# Patient Record
Sex: FEMALE | Race: WHITE | NOT HISPANIC OR LATINO | URBAN - METROPOLITAN AREA
[De-identification: names, ages, dates, MRNs, and addresses within clinical notes are randomized per-mention and may not be internally consistent; named-entity substitution may affect disease eponyms.]

---

## 2017-02-22 ENCOUNTER — OUTPATIENT (OUTPATIENT)
Dept: OUTPATIENT SERVICES | Facility: HOSPITAL | Age: 67
LOS: 1 days | End: 2017-02-22
Payer: MEDICARE

## 2017-02-22 PROCEDURE — 74230 X-RAY XM SWLNG FUNCJ C+: CPT | Mod: 26

## 2017-02-22 PROCEDURE — G8997: CPT | Mod: KX,CI

## 2017-02-22 PROCEDURE — G8998: CPT | Mod: KX,CI

## 2017-02-22 PROCEDURE — G8996: CPT | Mod: KX,CI

## 2017-02-22 PROCEDURE — 74230 X-RAY XM SWLNG FUNCJ C+: CPT

## 2017-02-22 PROCEDURE — 92611 MOTION FLUOROSCOPY/SWALLOW: CPT | Mod: KX

## 2017-09-29 PROBLEM — Z00.00 ENCOUNTER FOR PREVENTIVE HEALTH EXAMINATION: Status: ACTIVE | Noted: 2017-09-29

## 2017-10-05 ENCOUNTER — APPOINTMENT (OUTPATIENT)
Dept: NEUROLOGY | Facility: CLINIC | Age: 67
End: 2017-10-05
Payer: MEDICARE

## 2017-10-05 VITALS
WEIGHT: 192 LBS | HEART RATE: 73 BPM | HEIGHT: 62 IN | SYSTOLIC BLOOD PRESSURE: 162 MMHG | BODY MASS INDEX: 35.33 KG/M2 | DIASTOLIC BLOOD PRESSURE: 91 MMHG

## 2017-10-05 DIAGNOSIS — Z78.9 OTHER SPECIFIED HEALTH STATUS: ICD-10-CM

## 2017-10-05 PROCEDURE — 99205 OFFICE O/P NEW HI 60 MIN: CPT

## 2017-10-05 RX ORDER — ESCITALOPRAM OXALATE 20 MG/1
20 TABLET, FILM COATED ORAL
Refills: 0 | Status: ACTIVE | COMMUNITY

## 2017-10-12 ENCOUNTER — APPOINTMENT (OUTPATIENT)
Dept: NEUROLOGY | Facility: CLINIC | Age: 67
End: 2017-10-12
Payer: MEDICARE

## 2017-10-12 PROCEDURE — 95910 NRV CNDJ TEST 7-8 STUDIES: CPT

## 2017-10-12 PROCEDURE — 95885 MUSC TST DONE W/NERV TST LIM: CPT

## 2017-10-12 PROCEDURE — 99215 OFFICE O/P EST HI 40 MIN: CPT | Mod: 25

## 2017-11-20 ENCOUNTER — APPOINTMENT (OUTPATIENT)
Dept: NEUROLOGY | Facility: CLINIC | Age: 67
End: 2017-11-20
Payer: MEDICARE

## 2017-11-20 VITALS
SYSTOLIC BLOOD PRESSURE: 165 MMHG | HEART RATE: 64 BPM | BODY MASS INDEX: 35.33 KG/M2 | DIASTOLIC BLOOD PRESSURE: 102 MMHG | WEIGHT: 192 LBS | HEIGHT: 62 IN

## 2017-11-20 PROCEDURE — 99215 OFFICE O/P EST HI 40 MIN: CPT | Mod: 25

## 2017-11-20 PROCEDURE — 94010 BREATHING CAPACITY TEST: CPT

## 2017-12-14 ENCOUNTER — APPOINTMENT (OUTPATIENT)
Dept: PULMONOLOGY | Facility: CLINIC | Age: 67
End: 2017-12-14

## 2018-01-08 ENCOUNTER — APPOINTMENT (OUTPATIENT)
Dept: NEUROLOGY | Facility: CLINIC | Age: 68
End: 2018-01-08
Payer: MEDICARE

## 2018-01-08 VITALS
WEIGHT: 181 LBS | SYSTOLIC BLOOD PRESSURE: 169 MMHG | BODY MASS INDEX: 33.31 KG/M2 | HEART RATE: 87 BPM | HEIGHT: 62 IN | DIASTOLIC BLOOD PRESSURE: 80 MMHG

## 2018-01-08 VITALS — SYSTOLIC BLOOD PRESSURE: 134 MMHG | DIASTOLIC BLOOD PRESSURE: 90 MMHG | HEART RATE: 87 BPM

## 2018-01-08 DIAGNOSIS — D33.3 BENIGN NEOPLASM OF CRANIAL NERVES: ICD-10-CM

## 2018-01-08 DIAGNOSIS — K85.90 ACUTE PANCREATITIS WITHOUT NECROSIS OR INFECTION, UNSPECIFIED: ICD-10-CM

## 2018-01-08 DIAGNOSIS — K21.9 GASTRO-ESOPHAGEAL REFLUX DISEASE W/OUT ESOPHAGITIS: ICD-10-CM

## 2018-01-08 DIAGNOSIS — I10 ESSENTIAL (PRIMARY) HYPERTENSION: ICD-10-CM

## 2018-01-08 PROCEDURE — 99215 OFFICE O/P EST HI 40 MIN: CPT

## 2018-04-09 ENCOUNTER — APPOINTMENT (OUTPATIENT)
Dept: NEUROLOGY | Facility: CLINIC | Age: 68
End: 2018-04-09
Payer: MEDICARE

## 2018-04-09 VITALS
WEIGHT: 174 LBS | SYSTOLIC BLOOD PRESSURE: 160 MMHG | DIASTOLIC BLOOD PRESSURE: 92 MMHG | BODY MASS INDEX: 32.02 KG/M2 | HEART RATE: 74 BPM | HEIGHT: 62 IN

## 2018-04-09 PROCEDURE — 99215 OFFICE O/P EST HI 40 MIN: CPT

## 2018-07-16 ENCOUNTER — APPOINTMENT (OUTPATIENT)
Dept: NEUROLOGY | Facility: CLINIC | Age: 68
End: 2018-07-16
Payer: MEDICARE

## 2018-07-16 VITALS
BODY MASS INDEX: 29.63 KG/M2 | DIASTOLIC BLOOD PRESSURE: 84 MMHG | HEART RATE: 73 BPM | SYSTOLIC BLOOD PRESSURE: 132 MMHG | WEIGHT: 161 LBS | HEIGHT: 62 IN

## 2018-07-16 PROCEDURE — 99215 OFFICE O/P EST HI 40 MIN: CPT

## 2018-10-15 ENCOUNTER — APPOINTMENT (OUTPATIENT)
Dept: NEUROLOGY | Facility: CLINIC | Age: 68
End: 2018-10-15
Payer: MEDICARE

## 2018-10-15 VITALS
HEART RATE: 107 BPM | WEIGHT: 146 LBS | SYSTOLIC BLOOD PRESSURE: 154 MMHG | BODY MASS INDEX: 26.87 KG/M2 | DIASTOLIC BLOOD PRESSURE: 90 MMHG | HEIGHT: 62 IN

## 2018-10-15 PROCEDURE — 99215 OFFICE O/P EST HI 40 MIN: CPT

## 2018-12-04 ENCOUNTER — TRANSCRIPTION ENCOUNTER (OUTPATIENT)
Age: 68
End: 2018-12-04

## 2018-12-05 ENCOUNTER — OUTPATIENT (OUTPATIENT)
Dept: OUTPATIENT SERVICES | Facility: HOSPITAL | Age: 68
LOS: 1 days | End: 2018-12-05
Payer: MEDICARE

## 2018-12-05 DIAGNOSIS — K80.00 CALCULUS OF GALLBLADDER WITH ACUTE CHOLECYSTITIS WITHOUT OBSTRUCTION: ICD-10-CM

## 2018-12-05 PROCEDURE — L8699: CPT

## 2018-12-05 PROCEDURE — 43246 EGD PLACE GASTROSTOMY TUBE: CPT

## 2018-12-07 ENCOUNTER — RX RENEWAL (OUTPATIENT)
Age: 68
End: 2018-12-07

## 2018-12-07 RX ORDER — ONDANSETRON 4 MG/5ML
4 SOLUTION ORAL
Qty: 600 | Refills: 2 | Status: ACTIVE | COMMUNITY
Start: 2018-12-07 | End: 1900-01-01

## 2019-01-07 ENCOUNTER — APPOINTMENT (OUTPATIENT)
Dept: NEUROLOGY | Facility: CLINIC | Age: 69
End: 2019-01-07
Payer: MEDICARE

## 2019-01-07 VITALS
HEIGHT: 62 IN | SYSTOLIC BLOOD PRESSURE: 149 MMHG | WEIGHT: 136 LBS | HEART RATE: 112 BPM | DIASTOLIC BLOOD PRESSURE: 94 MMHG | BODY MASS INDEX: 25.03 KG/M2

## 2019-01-07 LAB
BASOPHILS # BLD AUTO: 0.01 K/UL
BASOPHILS NFR BLD AUTO: 0.1 %
EOSINOPHIL # BLD AUTO: 0.01 K/UL
EOSINOPHIL NFR BLD AUTO: 0.1 %
HCT VFR BLD CALC: 33.5 %
HGB BLD-MCNC: 11.9 G/DL
IMM GRANULOCYTES NFR BLD AUTO: 0.2 %
LYMPHOCYTES # BLD AUTO: 0.7 K/UL
LYMPHOCYTES NFR BLD AUTO: 7.4 %
MAN DIFF?: NORMAL
MCHC RBC-ENTMCNC: 35.1 PG
MCHC RBC-ENTMCNC: 35.5 GM/DL
MCV RBC AUTO: 98.8 FL
MONOCYTES # BLD AUTO: 0.65 K/UL
MONOCYTES NFR BLD AUTO: 6.9 %
NEUTROPHILS # BLD AUTO: 8.01 K/UL
NEUTROPHILS NFR BLD AUTO: 85.3 %
PLATELET # BLD AUTO: 223 K/UL
RBC # BLD: 3.39 M/UL
RBC # FLD: 13.5 %
WBC # FLD AUTO: 9.4 K/UL

## 2019-01-07 PROCEDURE — 99215 OFFICE O/P EST HI 40 MIN: CPT

## 2019-01-07 NOTE — PHYSICAL EXAM
[Person] : oriented to person [Place] : oriented to place [Time] : oriented to time [Short Term Intact] : short term memory intact [Remote Intact] : remote memory intact [Registration Intact] : recent registration memory intact [Concentration Intact] : normal concentrating ability [Visual Intact] : visual attention was ~T not ~L decreased [Naming Objects] : no difficulty naming common objects [Repeating Phrases] : no difficulty repeating a phrase [Writing A Sentence] : no difficulty writing a sentence [Fluency] : fluency intact [Comprehension] : comprehension intact [Reading] : reading intact [Past History] : adequate knowledge of personal past history [Cranial Nerves Optic (II)] : visual acuity intact bilaterally,  visual fields full to confrontation, pupils equal round and reactive to light [Cranial Nerves Oculomotor (III)] : extraocular motion intact [Cranial Nerves Trigeminal (V)] : facial sensation intact symmetrically [Cranial Nerves Facial (VII)] : face symmetrical [Cranial Nerves Vestibulocochlear (VIII)] : hearing was intact bilaterally [Cranial Nerves Glossopharyngeal (IX)] : tongue and palate midline [Cranial Nerves Accessory (XI - Cranial And Spinal)] : head turning and shoulder shrug symmetric [Motor Tone] : muscle tone was normal in all four extremities [Motor Handedness Right-Handed] : the patient is right hand dominant [Hand Weakness Right] : the hand  was weak [-4] : arm extension  -4/5 [Hand Weakness Left] : the hand  was weak [4] : fingers flexion 4/5 [+4] : knee extension +4/5 [5] : ankle plantar flexion 5/5 [Sensation Tactile Decrease] : light touch was intact [Sensation Vibration Decrease] : vibration was intact [Proprioception] : proprioception was intact [Balance] : balance was intact [Past-pointing] : there was no past-pointing [Tremor] : no tremor present [3+] : Ankle jerk left 3+ [Plantar Reflex Right Only] : abnormal on the right [Plantar Reflex Left Only] : abnormal on the left [___] : absent on the right [FreeTextEntry5] : tongue atrophy and fascics, not moving, loss of speech [FreeTextEntry6] : ALS-FRS 24/48, bilateral intrinsic hand atrophy [FreeTextEntry8] : slower gait

## 2019-01-07 NOTE — ASSESSMENT
[FreeTextEntry1] : Patient slowly progressing, now with more UE and LE weakness.  Loss of speech but still able to swallow solids, getting fluids in PEG. \par \par Will cont riluzole and glycopyrrolate, check LFT's\par \par Will discuss NIV at next visit

## 2019-01-07 NOTE — HISTORY OF PRESENT ILLNESS
[FreeTextEntry1] : Patient now seeing Dr. Perlman for GI issues.  Working up to 5 cans daily of formula.  Patient reports right side getting weaker in the arm and hand, walks ok but starting to notice minor changes.  Fine movements of hands becoming difficult.  She is still eating solid food ok, putting free water in PEG.  Lives with son. Now getting ALMEIDA.

## 2019-01-08 LAB
ALBUMIN SERPL ELPH-MCNC: 3.6 G/DL
ALP BLD-CCNC: 87 U/L
ALT SERPL-CCNC: 13 U/L
AST SERPL-CCNC: 29 U/L
BILIRUB DIRECT SERPL-MCNC: 0.3 MG/DL
BILIRUB INDIRECT SERPL-MCNC: 0.8 MG/DL
BILIRUB SERPL-MCNC: 1.2 MG/DL
PROT SERPL-MCNC: 9.8 G/DL

## 2019-02-12 ENCOUNTER — APPOINTMENT (OUTPATIENT)
Dept: PULMONOLOGY | Facility: CLINIC | Age: 69
End: 2019-02-12
Payer: MEDICARE

## 2019-02-12 VITALS
HEIGHT: 62 IN | HEART RATE: 85 BPM | DIASTOLIC BLOOD PRESSURE: 85 MMHG | SYSTOLIC BLOOD PRESSURE: 145 MMHG | OXYGEN SATURATION: 97 % | TEMPERATURE: 97 F | RESPIRATION RATE: 16 BRPM | WEIGHT: 136 LBS | BODY MASS INDEX: 25.03 KG/M2

## 2019-02-12 DIAGNOSIS — R06.00 DYSPNEA, UNSPECIFIED: ICD-10-CM

## 2019-02-12 DIAGNOSIS — R06.89 DYSPNEA, UNSPECIFIED: ICD-10-CM

## 2019-02-12 DIAGNOSIS — G12.20 MOTOR NEURON DISEASE, UNSPECIFIED: ICD-10-CM

## 2019-02-12 DIAGNOSIS — J30.9 ALLERGIC RHINITIS, UNSPECIFIED: ICD-10-CM

## 2019-02-12 PROCEDURE — 99204 OFFICE O/P NEW MOD 45 MIN: CPT

## 2019-02-12 RX ORDER — ONDANSETRON 8 MG/1
8 TABLET, ORALLY DISINTEGRATING ORAL
Qty: 30 | Refills: 5 | Status: DISCONTINUED | COMMUNITY
Start: 2018-12-07 | End: 2019-02-12

## 2019-02-12 RX ORDER — NEBIVOLOL HYDROCHLORIDE 10 MG/1
10 TABLET ORAL
Refills: 0 | Status: DISCONTINUED | COMMUNITY
End: 2019-02-12

## 2019-02-12 RX ORDER — FLUTICASONE PROPIONATE 50 UG/1
50 SPRAY, METERED NASAL
Qty: 1 | Refills: 5 | Status: ACTIVE | COMMUNITY
Start: 2019-02-12 | End: 1900-01-01

## 2019-02-12 RX ORDER — PANTOPRAZOLE SODIUM 40 MG/1
40 TABLET, DELAYED RELEASE ORAL
Refills: 0 | Status: DISCONTINUED | COMMUNITY
End: 2019-02-12

## 2019-02-12 RX ORDER — FEXOFENADINE HYDROCHLORIDE 30 MG/5ML
30 SUSPENSION ORAL DAILY
Qty: 150 | Refills: 5 | Status: DISCONTINUED | COMMUNITY
Start: 2018-01-08 | End: 2019-02-12

## 2019-02-12 NOTE — ASSESSMENT
[FreeTextEntry1] : 68F hx ALS, acoustic neuroma now w/ Bell's Palsy, who comes today for initial evaluation. Referred by neurology for NIV therapy. Pt demonstrates bulbar weakness with dysphagia. Unable to sleep flat and requires chair/upright position at night. At this time, she will require some type of NIV for her neuromuscular disease. Unable to complete PFTs because of poor seal from Bell's Palsy. \par - will order bilevel acclimatizing test - IBW 50 kg, Vt should be around 300\par \par Case d/w Dr. Genao

## 2019-02-12 NOTE — PHYSICAL EXAM
[General Appearance - Well Developed] : well developed [General Appearance - In No Acute Distress] : no acute distress [Normal Conjunctiva] : the conjunctiva exhibited no abnormalities [Neck Appearance] : the appearance of the neck was normal [Heart Sounds] : normal S1 and S2 [Murmurs] : no murmurs present [] : no respiratory distress [Exaggerated Use Of Accessory Muscles For Inspiration] : no accessory muscle use [Auscultation Breath Sounds / Voice Sounds] : lungs were clear to auscultation bilaterally [Bowel Sounds] : normal bowel sounds [Abdomen Soft] : soft [Abnormal Walk] : normal gait [Nail Clubbing] : no clubbing of the fingernails [Cyanosis, Localized] : no localized cyanosis [Skin Color & Pigmentation] : normal skin color and pigmentation [Skin Turgor] : normal skin turgor [Oriented To Time, Place, And Person] : oriented to person, place, and time [Affect] : the affect was normal [Mood] : the mood was normal [FreeTextEntry1] : Bell's palsy [FreeTextEntry2] : no b/l LE edema

## 2019-02-12 NOTE — HISTORY OF PRESENT ILLNESS
[FreeTextEntry1] : 68F hx ALS, acoustic neuroma now w/ Bell's Palsy, who comes today for initial evaluation. Referred by neurology for NIV therapy. Pt has ALS, which was first diagnosed around 10/2017 when she had changes to her voice, followed by progressive weakness. Sees neurology and in the past year has had difficulty with balancing, dysphagia, progressive weakness, inability to speak (communicates through writing). No difficulties breathing at night, though prefers to sit up when she sleeps. +weight loss, now has PEG for nutrition. Otherwise denies fevers, chills, chest pain, SOB, abdominal pain, nausea/vomiting, diarrhea, b/l LE edema. + Weak cough. Unable to complete PFTs today because of poor seal. \par  \par PFTs 1/8/2018: FEV1 58%, FEV1/FVC 60%

## 2019-02-12 NOTE — REVIEW OF SYSTEMS
[Recent Wt Loss (___ Lbs)] : recent [unfilled] ~Ulb weight loss [Dysphagia] : dysphagia [Involuntary Movements] : ~T involuntary movements [Negative] : Sleep Disorder [Fever] : no fever [Fatigue] : no fatigue [Heartburn] : no heartburn [Reflux] : no reflux [Diarrhea] : no diarrhea [Abdominal Pain] : no abdominal pain [Memory Loss] : no ~T memory lapses or loss [de-identified] : weakness, dysequilibrium

## 2019-02-12 NOTE — END OF VISIT
[] : Fellow [FreeTextEntry3] : Mrs. Dale has progressive ALS and would benefit from Bipap nocturnally.  She has progressive dyspnea, orthopnea, and bulbar symptoms.  Agree with recommendations as outliend above.   [Time Spent: ___ minutes] : I have spent [unfilled] minutes of face to face time with the patient

## 2019-03-20 ENCOUNTER — OUTPATIENT (OUTPATIENT)
Dept: OUTPATIENT SERVICES | Facility: HOSPITAL | Age: 69
LOS: 1 days | End: 2019-03-20
Payer: MEDICARE

## 2019-03-20 ENCOUNTER — APPOINTMENT (OUTPATIENT)
Dept: SLEEP CENTER | Facility: CLINIC | Age: 69
End: 2019-03-20
Payer: MEDICARE

## 2019-03-20 PROCEDURE — 95807 SLEEP STUDY ATTENDED: CPT

## 2019-03-20 PROCEDURE — 95807 SLEEP STUDY ATTENDED: CPT | Mod: 26

## 2019-03-21 DIAGNOSIS — G47.33 OBSTRUCTIVE SLEEP APNEA (ADULT) (PEDIATRIC): ICD-10-CM

## 2019-03-25 ENCOUNTER — APPOINTMENT (OUTPATIENT)
Dept: NEUROLOGY | Facility: CLINIC | Age: 69
End: 2019-03-25
Payer: MEDICARE

## 2019-03-25 VITALS
DIASTOLIC BLOOD PRESSURE: 79 MMHG | BODY MASS INDEX: 24.29 KG/M2 | SYSTOLIC BLOOD PRESSURE: 121 MMHG | HEIGHT: 62 IN | HEART RATE: 89 BPM | WEIGHT: 132 LBS

## 2019-03-25 PROCEDURE — 99215 OFFICE O/P EST HI 40 MIN: CPT

## 2019-03-25 NOTE — PHYSICAL EXAM
[Person] : oriented to person [Place] : oriented to place [Time] : oriented to time [Short Term Intact] : short term memory intact [Remote Intact] : remote memory intact [Registration Intact] : recent registration memory intact [Concentration Intact] : normal concentrating ability [Visual Intact] : visual attention was ~T not ~L decreased [Naming Objects] : no difficulty naming common objects [Repeating Phrases] : no difficulty repeating a phrase [Writing A Sentence] : no difficulty writing a sentence [Fluency] : fluency intact [Comprehension] : comprehension intact [Reading] : reading intact [Past History] : adequate knowledge of personal past history [Cranial Nerves Optic (II)] : visual acuity intact bilaterally,  visual fields full to confrontation, pupils equal round and reactive to light [Cranial Nerves Oculomotor (III)] : extraocular motion intact [Cranial Nerves Trigeminal (V)] : facial sensation intact symmetrically [Cranial Nerves Facial (VII)] : face symmetrical [Cranial Nerves Vestibulocochlear (VIII)] : hearing was intact bilaterally [Cranial Nerves Glossopharyngeal (IX)] : tongue and palate midline [Cranial Nerves Accessory (XI - Cranial And Spinal)] : head turning and shoulder shrug symmetric [Motor Handedness Right-Handed] : the patient is right hand dominant [Hand Weakness Right] : the hand  was weak [3] : shoulder abduction 3/5 [Hand Weakness Left] : the hand  was weak [-4] : ankle inversion -4/5 [4] : knee flexion 4/5 [+4] : ankle inversion +4/5 [Sensation Tactile Decrease] : light touch was intact [Sensation Vibration Decrease] : vibration was intact [Proprioception] : proprioception was intact [Balance] : balance was intact [3+] : Brachioradialis left 3+ [4+] : Ankle jerk left 4+ [Plantar Reflex Right Only] : abnormal on the right [Plantar Reflex Left Only] : abnormal on the left [___] : [unfilled] ~Ubeats present on the right [___] : [unfilled] ~Ubeats present on the left [Past-pointing] : there was no past-pointing [Tremor] : no tremor present [FreeTextEntry5] : tongue atrophy and fascics, not moving, loss of speech [FreeTextEntry6] : ALS-FRS 20/48, bilateral intrinsic hand atrophy, increased tone LLE [FreeTextEntry8] : walks with cane, unsteady and wide based

## 2019-03-25 NOTE — ASSESSMENT
[FreeTextEntry1] : Patient using writing board to communicate.  She is taking some small soft food for pleasure, will be getting BiPAP soon, is ok with NIV.  \par \par Would cont riluzole (switch to tiglutek liquid) and glycopyrrolate.\par \par Would now get her a WC.  \par \par We had a long discussion about her end of life wishes, she does not want invasive ventilation at this point and is going to continue to think about it.  She prefers hospice at home.  \par \par Her son is HC proxy and sister is alternate.  one hour spend on counseling and care

## 2019-03-25 NOTE — HISTORY OF PRESENT ILLNESS
[FreeTextEntry1] : Patient presents with sisters, she has been falling lately and she feels her legs are weaker, using cane.  Has a sprained right foot.  She is using Linzess for constipation.  She is swallowing soft foods, small portions, without coughing, no liquids - that goes into PEG.  Using 2-3 cans of formula daily.  Had bladder infection and had cipro, urinary sx's subsided.  Glycopyrrolate helping with the saliva and drooling.\par \par She had sleep study and is getting BiPAP soon.  She states she has been constipated for two years.  Has 2-3 BM's per week.

## 2019-03-26 ENCOUNTER — OTHER (OUTPATIENT)
Age: 69
End: 2019-03-26

## 2019-03-28 RX ORDER — LACTULOSE 10 G/15ML
10 SOLUTION ORAL
Qty: 1 | Refills: 5 | Status: ACTIVE | COMMUNITY
Start: 2019-03-28 | End: 1900-01-01

## 2019-04-08 ENCOUNTER — APPOINTMENT (OUTPATIENT)
Dept: NEUROLOGY | Facility: CLINIC | Age: 69
End: 2019-04-08

## 2019-04-15 ENCOUNTER — OUTPATIENT (OUTPATIENT)
Dept: OUTPATIENT SERVICES | Facility: HOSPITAL | Age: 69
LOS: 1 days | End: 2019-04-15
Payer: MEDICARE

## 2019-04-15 ENCOUNTER — APPOINTMENT (OUTPATIENT)
Dept: SLEEP CENTER | Facility: CLINIC | Age: 69
End: 2019-04-15
Payer: MEDICARE

## 2019-04-15 PROCEDURE — 95810 POLYSOM 6/> YRS 4/> PARAM: CPT | Mod: 26

## 2019-04-15 PROCEDURE — 95810 POLYSOM 6/> YRS 4/> PARAM: CPT

## 2019-04-16 DIAGNOSIS — G47.33 OBSTRUCTIVE SLEEP APNEA (ADULT) (PEDIATRIC): ICD-10-CM

## 2019-04-23 ENCOUNTER — APPOINTMENT (OUTPATIENT)
Dept: PULMONOLOGY | Facility: CLINIC | Age: 69
End: 2019-04-23
Payer: MEDICARE

## 2019-04-23 DIAGNOSIS — G70.9 MYONEURAL DISORDER, UNSPECIFIED: ICD-10-CM

## 2019-04-23 DIAGNOSIS — G47.36 MYONEURAL DISORDER, UNSPECIFIED: ICD-10-CM

## 2019-04-23 PROCEDURE — 36600 WITHDRAWAL OF ARTERIAL BLOOD: CPT | Mod: 59

## 2019-04-23 PROCEDURE — 82803 BLOOD GASES ANY COMBINATION: CPT

## 2019-04-29 ENCOUNTER — MOBILE ON CALL (OUTPATIENT)
Age: 69
End: 2019-04-29

## 2019-05-30 ENCOUNTER — APPOINTMENT (OUTPATIENT)
Dept: NEUROLOGY | Facility: CLINIC | Age: 69
End: 2019-05-30
Payer: MEDICARE

## 2019-05-30 VITALS
SYSTOLIC BLOOD PRESSURE: 124 MMHG | HEART RATE: 97 BPM | WEIGHT: 127 LBS | DIASTOLIC BLOOD PRESSURE: 83 MMHG | BODY MASS INDEX: 23.37 KG/M2 | HEIGHT: 62 IN

## 2019-05-30 PROCEDURE — 99214 OFFICE O/P EST MOD 30 MIN: CPT

## 2019-05-30 RX ORDER — LINACLOTIDE 72 UG/1
72 CAPSULE, GELATIN COATED ORAL DAILY
Refills: 0 | Status: ACTIVE | COMMUNITY

## 2019-05-30 RX ORDER — RILUZOLE 50 MG/1
50 TABLET, FILM COATED ORAL
Qty: 60 | Refills: 5 | Status: DISCONTINUED | COMMUNITY
Start: 2017-10-12 | End: 2019-05-30

## 2019-05-30 RX ORDER — RILUZOLE 50 MG/1
50 TABLET, FILM COATED ORAL
Qty: 60 | Refills: 5 | Status: DISCONTINUED | COMMUNITY
Start: 2018-10-15 | End: 2019-05-30

## 2019-05-30 NOTE — ASSESSMENT
[FreeTextEntry1] : Patient is stable with ALS-FRS, will f/u with pulmonary.  I will send to  clinic at [facility].\par \par cont with tiglutek, increase glycopyrrolate to 2mg TID\par \par f/u 4 month

## 2019-05-30 NOTE — HISTORY OF PRESENT ILLNESS
[FreeTextEntry1] : Patient here for f/u, states she can still swallow soft foods.  Has lost weight.  She bought an electric WC on [employer]'s list but needs a lighter one.  Still drooling, medication helps.  She denies SOB.  She is followed by pulmonary, can't do PFT's because can't make seal around mouthpiece.  Hands are getting weaker, but she can still write.  No falls.  No muscle cramps.  Using AAC

## 2019-05-30 NOTE — PHYSICAL EXAM
[Person] : oriented to person [Place] : oriented to place [Time] : oriented to time [Short Term Intact] : short term memory intact [Remote Intact] : remote memory intact [Registration Intact] : recent registration memory intact [Concentration Intact] : normal concentrating ability [Visual Intact] : visual attention was ~T not ~L decreased [Naming Objects] : no difficulty naming common objects [Repeating Phrases] : no difficulty repeating a phrase [Writing A Sentence] : no difficulty writing a sentence [Fluency] : fluency intact [Comprehension] : comprehension intact [Reading] : reading intact [Past History] : adequate knowledge of personal past history [Cranial Nerves Optic (II)] : visual acuity intact bilaterally,  visual fields full to confrontation, pupils equal round and reactive to light [Cranial Nerves Oculomotor (III)] : extraocular motion intact [Cranial Nerves Trigeminal (V)] : facial sensation intact symmetrically [Cranial Nerves Facial (VII)] : face symmetrical [Cranial Nerves Vestibulocochlear (VIII)] : hearing was intact bilaterally [Cranial Nerves Glossopharyngeal (IX)] : tongue and palate midline [Cranial Nerves Accessory (XI - Cranial And Spinal)] : head turning and shoulder shrug symmetric [Motor Handedness Right-Handed] : the patient is right hand dominant [Hand Weakness Right] : the hand  was weak [3] : shoulder abduction 3/5 [Hand Weakness Left] : the hand  was weak [-4] : ankle inversion -4/5 [4] : knee flexion 4/5 [+4] : ankle inversion +4/5 [Sensation Tactile Decrease] : light touch was intact [Sensation Vibration Decrease] : vibration was intact [Proprioception] : proprioception was intact [Balance] : balance was intact [Past-pointing] : there was no past-pointing [Tremor] : no tremor present [3+] : Brachioradialis left 3+ [4+] : Ankle jerk left 4+ [Plantar Reflex Right Only] : abnormal on the right [Plantar Reflex Left Only] : abnormal on the left [___] : [unfilled] ~Ubeats present on the right [___] : [unfilled] ~Ubeats present on the left [FreeTextEntry5] : tongue atrophy and fascics, not moving, loss of speech [FreeTextEntry6] : ALS-FRS 23/48, bilateral intrinsic hand atrophy, increased tone LLE [FreeTextEntry8] : walks with cane, unsteady and wide based

## 2019-06-14 ENCOUNTER — INPATIENT (INPATIENT)
Facility: HOSPITAL | Age: 69
LOS: 2 days | Discharge: HOME | DRG: 202 | End: 2019-06-17
Attending: INTERNAL MEDICINE | Admitting: HOSPITALIST
Payer: MEDICARE

## 2019-06-14 VITALS
OXYGEN SATURATION: 96 % | HEART RATE: 99 BPM | WEIGHT: 125 LBS | DIASTOLIC BLOOD PRESSURE: 100 MMHG | SYSTOLIC BLOOD PRESSURE: 164 MMHG | RESPIRATION RATE: 18 BRPM | TEMPERATURE: 99 F

## 2019-06-14 DIAGNOSIS — Z29.9 ENCOUNTER FOR PROPHYLACTIC MEASURES, UNSPECIFIED: ICD-10-CM

## 2019-06-14 DIAGNOSIS — I10 ESSENTIAL (PRIMARY) HYPERTENSION: ICD-10-CM

## 2019-06-14 DIAGNOSIS — J40 BRONCHITIS, NOT SPECIFIED AS ACUTE OR CHRONIC: ICD-10-CM

## 2019-06-14 DIAGNOSIS — G12.21 AMYOTROPHIC LATERAL SCLEROSIS: ICD-10-CM

## 2019-06-14 LAB
ALBUMIN SERPL ELPH-MCNC: 3.3 G/DL — SIGNIFICANT CHANGE UP (ref 3.3–5)
ALP SERPL-CCNC: 92 U/L — SIGNIFICANT CHANGE UP (ref 40–120)
ALT FLD-CCNC: 43 U/L — SIGNIFICANT CHANGE UP (ref 12–78)
ANION GAP SERPL CALC-SCNC: 8 MMOL/L — SIGNIFICANT CHANGE UP (ref 5–17)
APTT BLD: 38.1 SEC — HIGH (ref 28.5–37)
AST SERPL-CCNC: 22 U/L — SIGNIFICANT CHANGE UP (ref 15–37)
BASE EXCESS BLDA CALC-SCNC: 5.5 MMOL/L — HIGH (ref -2–2)
BASOPHILS # BLD AUTO: 0.02 K/UL — SIGNIFICANT CHANGE UP (ref 0–0.2)
BASOPHILS NFR BLD AUTO: 0.3 % — SIGNIFICANT CHANGE UP (ref 0–2)
BILIRUB SERPL-MCNC: 0.6 MG/DL — SIGNIFICANT CHANGE UP (ref 0.2–1.2)
BLOOD GAS COMMENTS ARTERIAL: SIGNIFICANT CHANGE UP
BLOOD GAS COMMENTS ARTERIAL: SIGNIFICANT CHANGE UP
BUN SERPL-MCNC: 32 MG/DL — HIGH (ref 7–23)
CALCIUM SERPL-MCNC: 9.5 MG/DL — SIGNIFICANT CHANGE UP (ref 8.5–10.1)
CHLORIDE SERPL-SCNC: 101 MMOL/L — SIGNIFICANT CHANGE UP (ref 96–108)
CO2 SERPL-SCNC: 29 MMOL/L — SIGNIFICANT CHANGE UP (ref 22–31)
CREAT SERPL-MCNC: 0.86 MG/DL — SIGNIFICANT CHANGE UP (ref 0.5–1.3)
EOSINOPHIL # BLD AUTO: 0 K/UL — SIGNIFICANT CHANGE UP (ref 0–0.5)
EOSINOPHIL NFR BLD AUTO: 0 % — SIGNIFICANT CHANGE UP (ref 0–6)
GLUCOSE SERPL-MCNC: 139 MG/DL — HIGH (ref 70–99)
HCO3 BLDA-SCNC: 29 MMOL/L — HIGH (ref 23–27)
HCT VFR BLD CALC: 31 % — LOW (ref 34.5–45)
HGB BLD-MCNC: 10.6 G/DL — LOW (ref 11.5–15.5)
HOROWITZ INDEX BLDA+IHG-RTO: 21 — SIGNIFICANT CHANGE UP
IMM GRANULOCYTES NFR BLD AUTO: 3.1 % — HIGH (ref 0–1.5)
INR BLD: 1.1 RATIO — SIGNIFICANT CHANGE UP (ref 0.88–1.16)
LACTATE SERPL-SCNC: 1.4 MMOL/L — SIGNIFICANT CHANGE UP (ref 0.7–2)
LYMPHOCYTES # BLD AUTO: 0.55 K/UL — LOW (ref 1–3.3)
LYMPHOCYTES # BLD AUTO: 9.5 % — LOW (ref 13–44)
MCHC RBC-ENTMCNC: 33.5 PG — SIGNIFICANT CHANGE UP (ref 27–34)
MCHC RBC-ENTMCNC: 34.2 GM/DL — SIGNIFICANT CHANGE UP (ref 32–36)
MCV RBC AUTO: 98.1 FL — SIGNIFICANT CHANGE UP (ref 80–100)
MONOCYTES # BLD AUTO: 0.19 K/UL — SIGNIFICANT CHANGE UP (ref 0–0.9)
MONOCYTES NFR BLD AUTO: 3.3 % — SIGNIFICANT CHANGE UP (ref 2–14)
NEUTROPHILS # BLD AUTO: 4.87 K/UL — SIGNIFICANT CHANGE UP (ref 1.8–7.4)
NEUTROPHILS NFR BLD AUTO: 83.8 % — HIGH (ref 43–77)
NRBC # BLD: 0 /100 WBCS — SIGNIFICANT CHANGE UP (ref 0–0)
PCO2 BLDA: 43 MMHG — SIGNIFICANT CHANGE UP (ref 32–46)
PH BLDA: 7.45 — SIGNIFICANT CHANGE UP (ref 7.35–7.45)
PLATELET # BLD AUTO: 259 K/UL — SIGNIFICANT CHANGE UP (ref 150–400)
PO2 BLDA: 76 MMHG — SIGNIFICANT CHANGE UP (ref 74–108)
POTASSIUM SERPL-MCNC: 3.9 MMOL/L — SIGNIFICANT CHANGE UP (ref 3.5–5.3)
POTASSIUM SERPL-SCNC: 3.9 MMOL/L — SIGNIFICANT CHANGE UP (ref 3.5–5.3)
PROT SERPL-MCNC: 9.6 G/DL — HIGH (ref 6–8.3)
PROTHROM AB SERPL-ACNC: 12.6 SEC — SIGNIFICANT CHANGE UP (ref 10–12.9)
RBC # BLD: 3.16 M/UL — LOW (ref 3.8–5.2)
RBC # FLD: 11.9 % — SIGNIFICANT CHANGE UP (ref 10.3–14.5)
SAO2 % BLDA: 94 % — SIGNIFICANT CHANGE UP (ref 92–96)
SODIUM SERPL-SCNC: 138 MMOL/L — SIGNIFICANT CHANGE UP (ref 135–145)
WBC # BLD: 5.81 K/UL — SIGNIFICANT CHANGE UP (ref 3.8–10.5)
WBC # FLD AUTO: 5.81 K/UL — SIGNIFICANT CHANGE UP (ref 3.8–10.5)

## 2019-06-14 PROCEDURE — 99222 1ST HOSP IP/OBS MODERATE 55: CPT | Mod: GC

## 2019-06-14 PROCEDURE — 71045 X-RAY EXAM CHEST 1 VIEW: CPT | Mod: 26

## 2019-06-14 PROCEDURE — 99285 EMERGENCY DEPT VISIT HI MDM: CPT

## 2019-06-14 RX ORDER — ENOXAPARIN SODIUM 100 MG/ML
40 INJECTION SUBCUTANEOUS EVERY 24 HOURS
Refills: 0 | Status: DISCONTINUED | OUTPATIENT
Start: 2019-06-14 | End: 2019-06-17

## 2019-06-14 RX ORDER — IPRATROPIUM/ALBUTEROL SULFATE 18-103MCG
3 AEROSOL WITH ADAPTER (GRAM) INHALATION ONCE
Refills: 0 | Status: COMPLETED | OUTPATIENT
Start: 2019-06-14 | End: 2019-06-14

## 2019-06-14 RX ORDER — GUAIFENESIN/DEXTROMETHORPHAN 600MG-30MG
10 TABLET, EXTENDED RELEASE 12 HR ORAL EVERY 4 HOURS
Refills: 0 | Status: DISCONTINUED | OUTPATIENT
Start: 2019-06-14 | End: 2019-06-15

## 2019-06-14 RX ORDER — GUAIFENESIN/DEXTROMETHORPHAN 600MG-30MG
20 TABLET, EXTENDED RELEASE 12 HR ORAL EVERY 4 HOURS
Refills: 0 | Status: DISCONTINUED | OUTPATIENT
Start: 2019-06-14 | End: 2019-06-14

## 2019-06-14 RX ADMIN — Medication 3 MILLILITER(S): at 20:32

## 2019-06-14 RX ADMIN — Medication 3 MILLILITER(S): at 21:49

## 2019-06-14 RX ADMIN — Medication 3 MILLILITER(S): at 21:01

## 2019-06-14 RX ADMIN — Medication 125 MILLIGRAM(S): at 23:14

## 2019-06-14 NOTE — H&P ADULT - NSHPSOCIALHISTORY_GEN_ALL_CORE
Marital Status:  Lives with:   Occupation:     Tobacco Use:   Alcohol Use:  Substance Use: Lives with: alone  Ambulates with cane    Tobacco Use: Denies  Alcohol Use: Denies  Substance Use: Denies

## 2019-06-14 NOTE — ED PROVIDER NOTE - CLINICAL SUMMARY MEDICAL DECISION MAKING FREE TEXT BOX
67 y/o female hx ALS, HTN, prior brain surgery who presents to the ED for cough and sob. patient chronically ill appearing, diffuse rhonchi throughout. 2nd healthcare visit for same sx. patient not hypoxic but is mildly tachypneic. patient failing outpatient nebs and steroids will require inpatient care with duonebs, steroids and chest pt - Erica Peralta PA-C

## 2019-06-14 NOTE — H&P ADULT - NSHPPHYSICALEXAM_GEN_ALL_CORE
Vital Signs Last 24 Hrs  T(C): 36.6 (14 Jun 2019 23:24), Max: 37.2 (14 Jun 2019 19:09)  T(F): 97.8 (14 Jun 2019 23:24), Max: 98.9 (14 Jun 2019 19:09)  HR: 88 (14 Jun 2019 23:24) (88 - 99)  BP: 115/73 (14 Jun 2019 23:24) (115/73 - 164/100)  BP(mean): --  RR: 16 (14 Jun 2019 23:24) (16 - 18)  SpO2: 95% (14 Jun 2019 23:24) (95% - 96%)

## 2019-06-14 NOTE — H&P ADULT - NSHPREVIEWOFSYSTEMS_GEN_ALL_CORE
CONSTITUTIONAL:  No fever, chills,  weight loss, weakness or fatigue.  HEENT:  Eyes:  No visual loss, blurred vision, diplopia or yellow sclerae.   Ears, Nose, Throat:  No hearing loss, congestion, runny nose or dysphagia.  CARDIOVASCULAR:  No chest pain, chest pressure or chest discomfort. No palpitations or edema.  RESPIRATORY:  No dyspnea, cough, congestion, wheeze.   GASTROINTESTINAL:  No abdominal pain, nausea, vomiting or diarrhea.   GENITOURINARY:  No dysuria, urinary frequency or hematuria.   NEUROLOGICAL:  No headache, dizziness, syncope, paralysis, ataxia, numbness or tingling in the extremities. No change in bowel or bladder control.  MUSCULOSKELETAL:  No muscle, back pain, joint pain or stiffness.  SKIN: No itch, rash, lesions.   HEMATOLOGIC:  No anemia, bleeding or bruising.  LYMPHATICS:  No enlarged nodes. No history of splenectomy.  PSYCHIATRIC:  No history of depression or anxiety.  ENDOCRINOLOGIC:  No reports of sweating, cold or heat intolerance. No polyuria or polydipsia. CONSTITUTIONAL:  No fever, chills, weight loss, weakness or fatigue.  HEENT:  Eyes:  No visual loss, blurred vision, diplopia or yellow sclerae.   Ears, Nose, Throat:  No hearing loss, congestion, runny nose or dysphagia.  CARDIOVASCULAR:  No chest pain, chest pressure or chest discomfort. No palpitations or edema.  RESPIRATORY: COUGH, SOB. No congestion, wheeze.   GASTROINTESTINAL:  No abdominal pain, nausea, vomiting or diarrhea.   GENITOURINARY:  No dysuria, urinary frequency or hematuria.   NEUROLOGICAL:  No headache, dizziness, syncope, paralysis, ataxia, numbness or tingling in the extremities.   MUSCULOSKELETAL:  No muscle, back pain, joint pain or stiffness.  SKIN: No itch, rash, lesions.

## 2019-06-14 NOTE — ED ADULT TRIAGE NOTE - CHIEF COMPLAINT QUOTE
pt recently dx with bronchitis, hx of ALS and having difficulty breathing and difficulty bringing up secretions

## 2019-06-14 NOTE — H&P ADULT - ASSESSMENT
68 F with PMH of ALS, HTN, prior brain surgery who presents to the ED with worsening cough and SOB admitted for bronchitis. 68 F with PMH of ALS, HTN, PEG tube, prior brain surgery who presents to the ED with worsening cough and SOB admitted for bronchitis.

## 2019-06-14 NOTE — ED PROVIDER NOTE - ATTENDING CONTRIBUTION TO CARE
I have personally performed a face to face diagnostic evaluation on this patient.  I have reviewed the PA note and agree with the history, exam, and plan of care, except as noted.  History and Exam by me shows patient with ALS, c/o cough, unable to clear secreations, feels shortness of breath, has been placed on steroids taking inhalers with no relief, patient aphasic, communicates with writing pad, lung sounds dimished and rhocchi thru out, abdomen soft, heart tachycardic, no pedal edema, f/u labs, ekg, chest xray, ABG, NIF, admit.

## 2019-06-14 NOTE — H&P ADULT - NSHPOUTPATIENTPROVIDERS_GEN_ALL_CORE
PMD: PMD: Dr. Janet Mcdaniels PMD: Dr. Janet Mcdaniels  Neuro: Dr. Membreno ([facility]

## 2019-06-14 NOTE — H&P ADULT - HISTORY OF PRESENT ILLNESS
68 F with PMH of ALS, HTN, prior brain surgery who presents to the ED with worsening cough and SOB. Sx started cough 8 days ago, 4 days ago went to [place] and had neg xray of the chest and was started on nebs and steroids but no abx. since that time patient has been having trouble clearing secretions, advised by her neurologist Dr. Membreno to come to ED.    In ED, VS stable. Labs significant for H/H 10.6/31. Lactate 1.4. ABG normal. CXR unremarkable. Patient received Duoneb and solumedrol in ED. 68 F with PMH of ALS, HTN, prior brain surgery who presents to the ED with worsening cough and SOB. Sx started 9 days ago and have not improved. Patient went to [place] where she had a negative CXR and was started on nebs and steroids in addition to levaquin which she had been taking for 8 days. Since that time patient has been having trouble clearing secretions, advised by her neurologist Dr. Membreno to come to ED. Denies fever, chills, weakness, runny nose.     In ED, VS stable. Labs significant for H/H 10.6/31. Lactate 1.4. ABG normal. CXR unremarkable. Patient received Duoneb and solumedrol in ED. 68 F with PMH of ALS, HTN, PEG tube, prior brain surgery who presents to the ED with worsening cough and SOB. Sx started 9 days ago and have not improved. Patient went to [place] where she had a negative CXR and was started on nebs and steroids in addition to levaquin which she had been taking for 8 days. Since that time patient has been having trouble clearing secretions, advised by her neurologist Dr. Membreno to come to ED. Denies fever, chills, weakness, runny nose.     In ED, VS stable. Labs significant for H/H 10.6/31. Lactate 1.4. ABG normal. CXR unremarkable. Patient received Duoneb and solumedrol in ED.

## 2019-06-14 NOTE — H&P ADULT - PROBLEM SELECTOR PLAN 4
IMPROVE VTE Individual Risk Assessment          RISK                                                          Points  [  ] Previous VTE                                                3  [  ] Thrombophilia                                            2  [  ] Lower limb paralysis                                  2        (unable to hold up >15 seconds)    [  ] Current Cancer                                            2         (within 6 months)  [  ] Immobilization > 24 hrs                             1  [  ] ICU/CCU stay > 24 hours                           1  [ X ] Age > 60                                                        1    IMPROVE VTE Score: 1    DVT ppx: lovenox

## 2019-06-14 NOTE — ED PROVIDER NOTE - OBJECTIVE STATEMENT
67 y/o female hx ALS, HTN, prior brain surgery who presents to the ED for cough and sob. she started with cough 8 days ago, 4 days ago went to [place] and had neg xray of the chest and was started on nebs and steroids but no abx. since that time patient has been having trouble clearing secretions, advised by her neurologist Dr. Membreno she should come to the ED. didn't measure temp at home so unsure if febrile. no other infectious symptoms.

## 2019-06-14 NOTE — ED ADULT NURSE NOTE - CHPI ED NUR SYMPTOMS NEG
no chills/no diaphoresis/no wheezing/no hemoptysis/no body aches/no chest pain/no edema/no fever/no headache

## 2019-06-14 NOTE — H&P ADULT - RS GEN PE MLT RESP DETAILS PC
no chest wall tenderness/breath sounds equal/respirations non-labored/no wheezes/clear to auscultation bilaterally/no rales

## 2019-06-14 NOTE — H&P ADULT - PROBLEM SELECTOR PLAN 1
Admit to telemetry.  CXR unremarkable and no signs of infection.    - Needs chest PT to help bring up secretions as patient unable to do so with ALS  - Continue steroids  - Admit to telemetry.  CXR unremarkable and no signs of infection.    - Needs chest PT to help bring up secretions as patient unable to do so with ALS  - Continue steroids  - Cough suppressant Admit to telemetry.  CXR unremarkable and no signs of infection. Needs chest PT to help bring up secretions as patient unable to do so with ALS  - Continue levaquin. Pt has been on it for 8 days already.   - Continue steroids  - Continue robitussin Admit to telemetry.  CXR unremarkable and no signs of infection. Needs chest PT to help bring up secretions as patient unable to do so with ALS  - Continue abx. Pt on levaquin for 8 days already.   - Continue steroids, nebs, and robitussin

## 2019-06-14 NOTE — H&P ADULT - PROBLEM SELECTOR PLAN 3
- continue riluzole and glycopyrrolate  - continue lexapro - continue riluzole and glycopyrrolate  - continue lexapro  - PEG tube with supplemental feedings. Patient able to eat cut up foods. - continue riluzole (to bring from home) and glycopyrrolate  - continue lexapro  - PEG tube with supplemental feedings. Patient able to eat cut up foods.

## 2019-06-14 NOTE — ED ADULT NURSE NOTE - NSIMPLEMENTINTERV_GEN_ALL_ED
Implemented All Universal Safety Interventions:  [place] to call system. Call bell, personal items and telephone within reach. Instruct patient to call for assistance. Room bathroom lighting operational. Non-slip footwear when patient is off stretcher. Physically safe environment: no spills, clutter or unnecessary equipment. Stretcher in lowest position, wheels locked, appropriate side rails in place.

## 2019-06-14 NOTE — ED ADULT NURSE NOTE - OBJECTIVE STATEMENT
Pt comes from triage. Pt reports she was diagnosed with bronchitis a couple days ago, given nebulizer, steroids and antibiotics. Pt reports she is having difficulty bringing up secretions due to hx of ALS. Pt unable to speak due to ALS, son is with pt and pt is able to write on special board she has at bedside. Pt breathing easy, unlabored, no signs of distress, rhonchi on auscultation.

## 2019-06-15 LAB
ANION GAP SERPL CALC-SCNC: 6 MMOL/L — SIGNIFICANT CHANGE UP (ref 5–17)
BUN SERPL-MCNC: 38 MG/DL — HIGH (ref 7–23)
CALCIUM SERPL-MCNC: 9.3 MG/DL — SIGNIFICANT CHANGE UP (ref 8.5–10.1)
CHLORIDE SERPL-SCNC: 103 MMOL/L — SIGNIFICANT CHANGE UP (ref 96–108)
CO2 SERPL-SCNC: 31 MMOL/L — SIGNIFICANT CHANGE UP (ref 22–31)
CREAT SERPL-MCNC: 0.71 MG/DL — SIGNIFICANT CHANGE UP (ref 0.5–1.3)
GLUCOSE SERPL-MCNC: 144 MG/DL — HIGH (ref 70–99)
HCT VFR BLD CALC: 28.6 % — LOW (ref 34.5–45)
HCV AB S/CO SERPL IA: 0.11 S/CO — SIGNIFICANT CHANGE UP (ref 0–0.99)
HCV AB SERPL-IMP: SIGNIFICANT CHANGE UP
HGB BLD-MCNC: 10.9 G/DL — LOW (ref 11.5–15.5)
MCHC RBC-ENTMCNC: 32.6 PG — SIGNIFICANT CHANGE UP (ref 27–34)
MCHC RBC-ENTMCNC: 33.3 GM/DL — SIGNIFICANT CHANGE UP (ref 32–36)
MCV RBC AUTO: 96.6 FL — SIGNIFICANT CHANGE UP (ref 80–100)
NRBC # BLD: 0 /100 WBCS — SIGNIFICANT CHANGE UP (ref 0–0)
PLATELET # BLD AUTO: 207 K/UL — SIGNIFICANT CHANGE UP (ref 150–400)
POTASSIUM SERPL-MCNC: 3.8 MMOL/L — SIGNIFICANT CHANGE UP (ref 3.5–5.3)
POTASSIUM SERPL-SCNC: 3.8 MMOL/L — SIGNIFICANT CHANGE UP (ref 3.5–5.3)
RBC # BLD: 2.96 M/UL — LOW (ref 3.8–5.2)
RBC # FLD: 11.9 % — SIGNIFICANT CHANGE UP (ref 10.3–14.5)
SODIUM SERPL-SCNC: 140 MMOL/L — SIGNIFICANT CHANGE UP (ref 135–145)
WBC # BLD: 4.82 K/UL — SIGNIFICANT CHANGE UP (ref 3.8–10.5)
WBC # FLD AUTO: 4.82 K/UL — SIGNIFICANT CHANGE UP (ref 3.8–10.5)

## 2019-06-15 PROCEDURE — 99233 SBSQ HOSP IP/OBS HIGH 50: CPT

## 2019-06-15 RX ORDER — ROBINUL 0.2 MG/ML
1 INJECTION INTRAMUSCULAR; INTRAVENOUS THREE TIMES A DAY
Refills: 0 | Status: DISCONTINUED | OUTPATIENT
Start: 2019-06-15 | End: 2019-06-17

## 2019-06-15 RX ORDER — AMLODIPINE BESYLATE 2.5 MG/1
5 TABLET ORAL DAILY
Refills: 0 | Status: DISCONTINUED | OUTPATIENT
Start: 2019-06-15 | End: 2019-06-17

## 2019-06-15 RX ORDER — ALBUTEROL 90 UG/1
2.5 AEROSOL, METERED ORAL EVERY 6 HOURS
Refills: 0 | Status: DISCONTINUED | OUTPATIENT
Start: 2019-06-15 | End: 2019-06-17

## 2019-06-15 RX ORDER — ESCITALOPRAM OXALATE 10 MG/1
20 TABLET, FILM COATED ORAL DAILY
Refills: 0 | Status: DISCONTINUED | OUTPATIENT
Start: 2019-06-15 | End: 2019-06-17

## 2019-06-15 RX ADMIN — ROBINUL 1 MILLIGRAM(S): 0.2 INJECTION INTRAMUSCULAR; INTRAVENOUS at 11:57

## 2019-06-15 RX ADMIN — ALBUTEROL 2.5 MILLIGRAM(S): 90 AEROSOL, METERED ORAL at 22:13

## 2019-06-15 RX ADMIN — ENOXAPARIN SODIUM 40 MILLIGRAM(S): 100 INJECTION SUBCUTANEOUS at 06:25

## 2019-06-15 RX ADMIN — ROBINUL 1 MILLIGRAM(S): 0.2 INJECTION INTRAMUSCULAR; INTRAVENOUS at 06:25

## 2019-06-15 RX ADMIN — ESCITALOPRAM OXALATE 20 MILLIGRAM(S): 10 TABLET, FILM COATED ORAL at 11:57

## 2019-06-15 RX ADMIN — Medication 100 MILLIGRAM(S): at 22:17

## 2019-06-15 RX ADMIN — AMLODIPINE BESYLATE 5 MILLIGRAM(S): 2.5 TABLET ORAL at 06:25

## 2019-06-15 RX ADMIN — ROBINUL 1 MILLIGRAM(S): 0.2 INJECTION INTRAMUSCULAR; INTRAVENOUS at 22:17

## 2019-06-15 RX ADMIN — Medication 40 MILLIGRAM(S): at 06:25

## 2019-06-15 NOTE — PROGRESS NOTE ADULT - PROBLEM SELECTOR PLAN 3
- continue riluzole (to bring from home) and glycopyrrolate  - continue lexapro  - PEG tube with supplemental feedings. Patient able to eat cut up foods.

## 2019-06-15 NOTE — PROGRESS NOTE ADULT - ASSESSMENT
68 F with PMH of ALS, HTN, PEG tube, prior brain surgery who presents to the ED with worsening cough and SOB admitted for bronchitis.

## 2019-06-15 NOTE — PROGRESS NOTE ADULT - SUBJECTIVE AND OBJECTIVE BOX
Patient is a 68y old  Female who presents with a chief complaint of Bronchitis (14 Jun 2019 23:33)      INTERVAL HPI: Pt seen and examined bedside, has no complaints. Pt is non-verbal and she did write for me. saying she feels much better, still has cough with phlegm. Denies any fever, CP or SOB  OVERNIGHT EVENTS:  T(F): 97.8 (06-15-19 @ 12:26), Max: 98.9 (06-14-19 @ 19:09)  HR: 82 (06-15-19 @ 12:26) (82 - 99)  BP: 136/83 (06-15-19 @ 12:26) (108/60 - 164/100)  RR: 18 (06-15-19 @ 12:26) (16 - 18)  SpO2: 95% (06-15-19 @ 12:26) (91% - 97%)  Wt(kg): --  I&O's Summary      REVIEW OF SYSTEM:    Constitutional: No fever, chills, fatigue  Neuro: No headache, numbness, weakness  Resp: +cough, no wheezing, shortness of breath  CVS: No chest pain, palpitations, leg swelling  GI: No abdominal pain, nausea, vomiting, diarrhea   : No dysuria, frequency, incontinence  Skin: No itching, burning, rashes, or lesions   Msk: No joint pain or swelling  Psych: No depression, anxiety, mood swings          PHYSICAL EXAM:  GENERAL: NAD, well-developed  HEAD:  Atraumatic, Normocephalic  ENMT: No tonsillar erythema, exudates, or enlargement; Moist mucous membranes,   NECK: Supple, No JVD, Normal thyroid  HEART: Regular rate and rhythm; No murmurs, rubs, or gallops  RESPIRATORY: diminished BS B/L, No wheezing / rhonchi  ABDOMEN: Soft, Nontender, Nondistended; Bowel sounds present, PEG+  NEUROLOGY: A&Ox3, + facial droop, moving all extremities.  EXTREMITIES:  2+ Peripheral Pulses, No clubbing, cyanosis, or edema  SKIN: warm, dry, normal color, no rash or abnormal lesions        LABS:                        10.9   4.82  )-----------( 207      ( 15 Adolph 2019 05:00 )             28.6     06-15    140  |  103  |  38<H>  ----------------------------<  144<H>  3.8   |  31  |  0.71    Ca    9.3      15 Adolph 2019 05:00    TPro  9.6<H>  /  Alb  3.3  /  TBili  0.6  /  DBili  x   /  AST  22  /  ALT  43  /  AlkPhos  92  06-14    PT/INR - ( 14 Jun 2019 20:22 )   PT: 12.6 sec;   INR: 1.10 ratio         PTT - ( 14 Jun 2019 20:22 )  PTT:38.1 sec    CAPILLARY BLOOD GLUCOSE        ABG - ( 14 Jun 2019 23:07 )  pH, Arterial: 7.45  pH, Blood: x     /  pCO2: 43    /  pO2: 76    / HCO3: 29    / Base Excess: 5.5   /  SaO2: 94                        MEDICATIONS  (STANDING):  amLODIPine   Tablet 5 milliGRAM(s) Oral daily  enoxaparin Injectable 40 milliGRAM(s) SubCutaneous every 24 hours  escitalopram 20 milliGRAM(s) Oral daily  glycopyrrolate 1 milliGRAM(s) Oral three times a day  levoFLOXacin IVPB 750 milliGRAM(s) IV Intermittent every 24 hours  methylPREDNISolone sodium succinate Injectable 40 milliGRAM(s) IV Push daily    MEDICATIONS  (PRN):  ALBUTerol   0.5% 2.5 milliGRAM(s) Nebulizer every 6 hours PRN Shortness of Breath and/or Wheezing  guaiFENesin   Syrup  (Sugar-Free) 100 milliGRAM(s) Oral every 4 hours PRN Cough

## 2019-06-15 NOTE — PROGRESS NOTE ADULT - PROBLEM SELECTOR PLAN 1
CXR unremarkable and no signs of infection.   C/W chest PT to help bring up secretions as patient unable to do so with ALS  Continue abx. Pt on levaquin for 8 days already.   Continue steroids, nebs, and robitussin

## 2019-06-16 LAB
ANION GAP SERPL CALC-SCNC: 5 MMOL/L — SIGNIFICANT CHANGE UP (ref 5–17)
BUN SERPL-MCNC: 31 MG/DL — HIGH (ref 7–23)
CALCIUM SERPL-MCNC: 9.3 MG/DL — SIGNIFICANT CHANGE UP (ref 8.5–10.1)
CHLORIDE SERPL-SCNC: 102 MMOL/L — SIGNIFICANT CHANGE UP (ref 96–108)
CO2 SERPL-SCNC: 32 MMOL/L — HIGH (ref 22–31)
CREAT SERPL-MCNC: 0.74 MG/DL — SIGNIFICANT CHANGE UP (ref 0.5–1.3)
GLUCOSE SERPL-MCNC: 99 MG/DL — SIGNIFICANT CHANGE UP (ref 70–99)
HCT VFR BLD CALC: 30.9 % — LOW (ref 34.5–45)
HGB BLD-MCNC: 10 G/DL — LOW (ref 11.5–15.5)
MCHC RBC-ENTMCNC: 32.4 GM/DL — SIGNIFICANT CHANGE UP (ref 32–36)
MCHC RBC-ENTMCNC: 32.6 PG — SIGNIFICANT CHANGE UP (ref 27–34)
MCV RBC AUTO: 100.7 FL — HIGH (ref 80–100)
NRBC # BLD: 0 /100 WBCS — SIGNIFICANT CHANGE UP (ref 0–0)
PLATELET # BLD AUTO: 221 K/UL — SIGNIFICANT CHANGE UP (ref 150–400)
POTASSIUM SERPL-MCNC: 3.5 MMOL/L — SIGNIFICANT CHANGE UP (ref 3.5–5.3)
POTASSIUM SERPL-SCNC: 3.5 MMOL/L — SIGNIFICANT CHANGE UP (ref 3.5–5.3)
RBC # BLD: 3.07 M/UL — LOW (ref 3.8–5.2)
RBC # FLD: 11.9 % — SIGNIFICANT CHANGE UP (ref 10.3–14.5)
SODIUM SERPL-SCNC: 139 MMOL/L — SIGNIFICANT CHANGE UP (ref 135–145)
WBC # BLD: 11.45 K/UL — HIGH (ref 3.8–10.5)
WBC # FLD AUTO: 11.45 K/UL — HIGH (ref 3.8–10.5)

## 2019-06-16 PROCEDURE — 99233 SBSQ HOSP IP/OBS HIGH 50: CPT

## 2019-06-16 RX ORDER — SODIUM CHLORIDE 0.65 %
1 AEROSOL, SPRAY (ML) NASAL
Refills: 0 | Status: DISCONTINUED | OUTPATIENT
Start: 2019-06-16 | End: 2019-06-17

## 2019-06-16 RX ORDER — LACTULOSE 10 G/15ML
15 SOLUTION ORAL DAILY
Refills: 0 | Status: DISCONTINUED | OUTPATIENT
Start: 2019-06-16 | End: 2019-06-17

## 2019-06-16 RX ADMIN — ESCITALOPRAM OXALATE 20 MILLIGRAM(S): 10 TABLET, FILM COATED ORAL at 11:17

## 2019-06-16 RX ADMIN — Medication 1 SPRAY(S): at 18:04

## 2019-06-16 RX ADMIN — ROBINUL 1 MILLIGRAM(S): 0.2 INJECTION INTRAMUSCULAR; INTRAVENOUS at 14:59

## 2019-06-16 RX ADMIN — Medication 100 MILLIGRAM(S): at 03:13

## 2019-06-16 RX ADMIN — Medication 600 MILLIGRAM(S): at 18:04

## 2019-06-16 RX ADMIN — ENOXAPARIN SODIUM 40 MILLIGRAM(S): 100 INJECTION SUBCUTANEOUS at 06:47

## 2019-06-16 RX ADMIN — ROBINUL 1 MILLIGRAM(S): 0.2 INJECTION INTRAMUSCULAR; INTRAVENOUS at 06:33

## 2019-06-16 RX ADMIN — ROBINUL 1 MILLIGRAM(S): 0.2 INJECTION INTRAMUSCULAR; INTRAVENOUS at 21:59

## 2019-06-16 RX ADMIN — AMLODIPINE BESYLATE 5 MILLIGRAM(S): 2.5 TABLET ORAL at 06:33

## 2019-06-16 RX ADMIN — Medication 40 MILLIGRAM(S): at 06:33

## 2019-06-16 NOTE — PROGRESS NOTE ADULT - SUBJECTIVE AND OBJECTIVE BOX
Patient is a 68y old  Female who presents with a chief complaint of Bronchitis (14 Jun 2019 23:33)      INTERVAL HPI: Pt seen and examined bedside, has no complaints. Pt is non-verbal and she did write for me. saying she feels much better, still has cough with phlegm. Denies any fever, CP or SOB.   Wants to take shower and saline nasal spray.  OVERNIGHT EVENTS:  Vital Signs Last 24 Hrs  T(C): 36.6 (16 Jun 2019 15:27), Max: 36.8 (15 Adolph 2019 20:11)  T(F): 97.9 (16 Jun 2019 15:27), Max: 98.3 (15 Adolph 2019 20:11)  HR: 89 (16 Jun 2019 15:27) (77 - 94)  BP: 138/89 (16 Jun 2019 15:27) (122/79 - 153/90)  BP(mean): --  RR: 21 (16 Jun 2019 15:27) (18 - 21)  SpO2: 99% (16 Jun 2019 15:27) (94% - 99%)    REVIEW OF SYSTEM:    Constitutional: No fever, chills, fatigue  Neuro: No headache, numbness, weakness  Resp: +cough, no wheezing, shortness of breath  CVS: No chest pain, palpitations, leg swelling  GI: No abdominal pain, nausea, vomiting, diarrhea   : No dysuria, frequency, incontinence  Skin: No itching, burning, rashes, or lesions   Msk: No joint pain or swelling  Psych: No depression, anxiety, mood swings          PHYSICAL EXAM:  GENERAL: NAD, well-developed  HEAD:  Atraumatic, Normocephalic  ENMT: No tonsillar erythema, exudates, or enlargement; Moist mucous membranes,   NECK: Supple, No JVD, Normal thyroid  HEART: Regular rate and rhythm; No murmurs, rubs, or gallops  RESPIRATORY: diminished BS B/L, No wheezing / rhonchi  ABDOMEN: Soft, Nontender, Nondistended; Bowel sounds present, PEG+  NEUROLOGY: non-verbal, A&Ox3, + facial droop, moving all extremities.  EXTREMITIES:  2+ Peripheral Pulses, No clubbing, cyanosis, or edema  SKIN: warm, dry, normal color, no rash or abnormal lesions        LABS:                        10.0   11.45 )-----------( 221      ( 16 Jun 2019 07:05 )             30.9     16 Jun 2019 07:05    139    |  102    |  31     ----------------------------<  99     3.5     |  32     |  0.74     Ca    9.3        16 Jun 2019 07:05      PT/INR - ( 14 Jun 2019 20:22 )   PT: 12.6 sec;   INR: 1.10 ratio         PTT - ( 14 Jun 2019 20:22 )  PTT:38.1 sec    CAPILLARY BLOOD GLUCOSE        UCx       RADIOLOGY & ADDITIONAL TESTS:          ABG - ( 14 Jun 2019 23:07 )  pH, Arterial: 7.45  pH, Blood: x     /  pCO2: 43    /  pO2: 76    / HCO3: 29    / Base Excess: 5.5   /  SaO2: 94                        MEDICATIONS  (STANDING):  amLODIPine   Tablet 5 milliGRAM(s) Oral daily  enoxaparin Injectable 40 milliGRAM(s) SubCutaneous every 24 hours  escitalopram 20 milliGRAM(s) Oral daily  glycopyrrolate 1 milliGRAM(s) Oral three times a day  levoFLOXacin IVPB 750 milliGRAM(s) IV Intermittent every 24 hours  methylPREDNISolone sodium succinate Injectable 40 milliGRAM(s) IV Push daily    MEDICATIONS  (PRN):  ALBUTerol   0.5% 2.5 milliGRAM(s) Nebulizer every 6 hours PRN Shortness of Breath and/or Wheezing  guaiFENesin   Syrup  (Sugar-Free) 100 milliGRAM(s) Oral every 4 hours PRN Cough

## 2019-06-16 NOTE — PROGRESS NOTE ADULT - PROBLEM SELECTOR PLAN 1
CXR unremarkable and no signs of infection.   C/W chest PT to help bring up secretions as patient unable to do so with ALS  Continue abx. Pt on levaquin for 8 days already.   Continue steroids, nebs, and mucinex  requested pul consult.

## 2019-06-17 ENCOUNTER — TRANSCRIPTION ENCOUNTER (OUTPATIENT)
Age: 69
End: 2019-06-17

## 2019-06-17 VITALS
TEMPERATURE: 98 F | RESPIRATION RATE: 19 BRPM | OXYGEN SATURATION: 95 % | DIASTOLIC BLOOD PRESSURE: 76 MMHG | SYSTOLIC BLOOD PRESSURE: 125 MMHG | HEART RATE: 87 BPM

## 2019-06-17 LAB
ANION GAP SERPL CALC-SCNC: 6 MMOL/L — SIGNIFICANT CHANGE UP (ref 5–17)
BUN SERPL-MCNC: 32 MG/DL — HIGH (ref 7–23)
CALCIUM SERPL-MCNC: 9 MG/DL — SIGNIFICANT CHANGE UP (ref 8.5–10.1)
CHLORIDE SERPL-SCNC: 100 MMOL/L — SIGNIFICANT CHANGE UP (ref 96–108)
CO2 SERPL-SCNC: 32 MMOL/L — HIGH (ref 22–31)
CREAT SERPL-MCNC: 0.77 MG/DL — SIGNIFICANT CHANGE UP (ref 0.5–1.3)
CRP SERPL-MCNC: 0.12 MG/DL — SIGNIFICANT CHANGE UP (ref 0–0.4)
GLUCOSE SERPL-MCNC: 107 MG/DL — HIGH (ref 70–99)
HCT VFR BLD CALC: 31.6 % — LOW (ref 34.5–45)
HGB BLD-MCNC: 11.7 G/DL — SIGNIFICANT CHANGE UP (ref 11.5–15.5)
MCHC RBC-ENTMCNC: 35.9 PG — HIGH (ref 27–34)
MCHC RBC-ENTMCNC: 37 GM/DL — HIGH (ref 32–36)
MCV RBC AUTO: 96.9 FL — SIGNIFICANT CHANGE UP (ref 80–100)
NRBC # BLD: 0 /100 WBCS — SIGNIFICANT CHANGE UP (ref 0–0)
PLATELET # BLD AUTO: 272 K/UL — SIGNIFICANT CHANGE UP (ref 150–400)
POTASSIUM SERPL-MCNC: 3.3 MMOL/L — LOW (ref 3.5–5.3)
POTASSIUM SERPL-SCNC: 3.3 MMOL/L — LOW (ref 3.5–5.3)
RBC # BLD: 3.26 M/UL — LOW (ref 3.8–5.2)
RBC # FLD: 11.7 % — SIGNIFICANT CHANGE UP (ref 10.3–14.5)
SODIUM SERPL-SCNC: 138 MMOL/L — SIGNIFICANT CHANGE UP (ref 135–145)
WBC # BLD: 9.89 K/UL — SIGNIFICANT CHANGE UP (ref 3.8–10.5)
WBC # FLD AUTO: 9.89 K/UL — SIGNIFICANT CHANGE UP (ref 3.8–10.5)

## 2019-06-17 PROCEDURE — 80048 BASIC METABOLIC PNL TOTAL CA: CPT

## 2019-06-17 PROCEDURE — 80053 COMPREHEN METABOLIC PANEL: CPT

## 2019-06-17 PROCEDURE — 96374 THER/PROPH/DIAG INJ IV PUSH: CPT

## 2019-06-17 PROCEDURE — 94150 VITAL CAPACITY TEST: CPT

## 2019-06-17 PROCEDURE — 84443 ASSAY THYROID STIM HORMONE: CPT

## 2019-06-17 PROCEDURE — 71250 CT THORAX DX C-: CPT | Mod: 26

## 2019-06-17 PROCEDURE — 97167 OT EVAL HIGH COMPLEX 60 MIN: CPT

## 2019-06-17 PROCEDURE — 85027 COMPLETE CBC AUTOMATED: CPT

## 2019-06-17 PROCEDURE — 99285 EMERGENCY DEPT VISIT HI MDM: CPT | Mod: 25

## 2019-06-17 PROCEDURE — 86140 C-REACTIVE PROTEIN: CPT

## 2019-06-17 PROCEDURE — 71250 CT THORAX DX C-: CPT

## 2019-06-17 PROCEDURE — 85730 THROMBOPLASTIN TIME PARTIAL: CPT

## 2019-06-17 PROCEDURE — 36415 COLL VENOUS BLD VENIPUNCTURE: CPT

## 2019-06-17 PROCEDURE — 85610 PROTHROMBIN TIME: CPT

## 2019-06-17 PROCEDURE — 83605 ASSAY OF LACTIC ACID: CPT

## 2019-06-17 PROCEDURE — 97161 PT EVAL LOW COMPLEX 20 MIN: CPT

## 2019-06-17 PROCEDURE — 94640 AIRWAY INHALATION TREATMENT: CPT

## 2019-06-17 PROCEDURE — 71045 X-RAY EXAM CHEST 1 VIEW: CPT

## 2019-06-17 PROCEDURE — 99239 HOSP IP/OBS DSCHRG MGMT >30: CPT

## 2019-06-17 PROCEDURE — 87040 BLOOD CULTURE FOR BACTERIA: CPT

## 2019-06-17 PROCEDURE — 86803 HEPATITIS C AB TEST: CPT

## 2019-06-17 PROCEDURE — 84145 PROCALCITONIN (PCT): CPT

## 2019-06-17 PROCEDURE — 82803 BLOOD GASES ANY COMBINATION: CPT

## 2019-06-17 RX ORDER — ALBUTEROL 90 UG/1
2.5 AEROSOL, METERED ORAL EVERY 8 HOURS
Refills: 0 | Status: DISCONTINUED | OUTPATIENT
Start: 2019-06-17 | End: 2019-06-17

## 2019-06-17 RX ORDER — IPRATROPIUM/ALBUTEROL SULFATE 18-103MCG
3 AEROSOL WITH ADAPTER (GRAM) INHALATION ONCE
Refills: 0 | Status: DISCONTINUED | OUTPATIENT
Start: 2019-06-17 | End: 2019-06-17

## 2019-06-17 RX ORDER — POTASSIUM CHLORIDE 20 MEQ
40 PACKET (EA) ORAL ONCE
Refills: 0 | Status: COMPLETED | OUTPATIENT
Start: 2019-06-17 | End: 2019-06-17

## 2019-06-17 RX ADMIN — Medication 1 SPRAY(S): at 06:47

## 2019-06-17 RX ADMIN — ROBINUL 1 MILLIGRAM(S): 0.2 INJECTION INTRAMUSCULAR; INTRAVENOUS at 21:09

## 2019-06-17 RX ADMIN — Medication 600 MILLIGRAM(S): at 17:47

## 2019-06-17 RX ADMIN — AMLODIPINE BESYLATE 5 MILLIGRAM(S): 2.5 TABLET ORAL at 06:47

## 2019-06-17 RX ADMIN — LACTULOSE 15 GRAM(S): 10 SOLUTION ORAL at 10:17

## 2019-06-17 RX ADMIN — Medication 40 MILLIEQUIVALENT(S): at 10:17

## 2019-06-17 RX ADMIN — ROBINUL 1 MILLIGRAM(S): 0.2 INJECTION INTRAMUSCULAR; INTRAVENOUS at 13:03

## 2019-06-17 RX ADMIN — Medication 1 SPRAY(S): at 17:48

## 2019-06-17 RX ADMIN — ALBUTEROL 2.5 MILLIGRAM(S): 90 AEROSOL, METERED ORAL at 20:56

## 2019-06-17 RX ADMIN — ROBINUL 1 MILLIGRAM(S): 0.2 INJECTION INTRAMUSCULAR; INTRAVENOUS at 06:47

## 2019-06-17 RX ADMIN — Medication 600 MILLIGRAM(S): at 21:09

## 2019-06-17 RX ADMIN — ALBUTEROL 2.5 MILLIGRAM(S): 90 AEROSOL, METERED ORAL at 15:40

## 2019-06-17 RX ADMIN — Medication 600 MILLIGRAM(S): at 06:47

## 2019-06-17 RX ADMIN — ENOXAPARIN SODIUM 40 MILLIGRAM(S): 100 INJECTION SUBCUTANEOUS at 06:47

## 2019-06-17 RX ADMIN — Medication 40 MILLIGRAM(S): at 06:47

## 2019-06-17 RX ADMIN — ESCITALOPRAM OXALATE 20 MILLIGRAM(S): 10 TABLET, FILM COATED ORAL at 13:03

## 2019-06-17 NOTE — CONSULT NOTE ADULT - PROBLEM SELECTOR RECOMMENDATION 9
ALS  ? need for ABX  HOB elev  asp prec  dysphagia  assist with all ADL  I bouchra  vital capacity eval - NM disease - affecting resp system  ct chest  -  eval pna, atelectasis  RAMIREZ - sleep study done with Dr. Genao - incomplete - pt never reached REM  monitor vs and HD and Sat  may need o2 support night time - keep sat > 88 pct  oral hygiene, skin care, will follow  discussed plan of care with the patient  NEBS tid  DC Solumedrol -

## 2019-06-17 NOTE — DIETITIAN INITIAL EVALUATION ADULT. - PHYSICAL APPEARANCE
underweight/debilitated/BMI 22.8 kg/m2; patient amenable to modified nutrition focused physical exam: temples (moderate), buccal (moderate), clavicle (moderate), temples (moderate), scapula (severe), interosseous (severe), thigh/patellar (moderate)

## 2019-06-17 NOTE — DISCHARGE NOTE PROVIDER - HOSPITAL COURSE
68 F with PMH of ALS, HTN, PEG tube, prior brain surgery who presents to the ED with worsening cough and SOB admitted for bronchitis. Symptoms started 9 days ago and have not improved. Patient went to [facility] where she had a negative CXR and was started on nebs and steroids in addition to Levaquin which she had been taking for 8 days outpatient. Patient received Levaquin in the ED. Pulm, Dr. Choi, evaluated patient, afebrile, no leukocytosis, procalcitonin within normal range. CT chest showed Left lower lobe infiltrate consistent with an infectious bronchiolitis.  Levaquin d/c. PT evaluated patient and recommended home with home PT. On day of discharge, patient was stable with close outpatient follow up.        Vital Signs Last 24 Hrs    T(C): 36.7 (17 Jun 2019 13:08), Max: 36.8 (17 Jun 2019 04:59)    T(F): 98.1 (17 Jun 2019 13:08), Max: 98.2 (17 Jun 2019 04:59)    HR: 92 (17 Jun 2019 13:08) (70 - 92)    BP: 116/77 (17 Jun 2019 13:08) (116/77 - 149/83)    BP(mean): --    RR: 20 (17 Jun 2019 13:08) (19 - 21)    SpO2: 94% (17 Jun 2019 13:08) (94% - 100%) 68 F with PMH of ALS, HTN, PEG tube, prior brain surgery who presents to the ED with worsening cough and SOB admitted for bronchitis. Symptoms started 9 days ago and have not improved. Patient went to [facility] where she had a negative CXR and was started on nebs and steroids in addition to Levaquin which she had been taking for 8 days outpatient. Patient received Levaquin in the ED. Pulm, Dr. Choi, evaluated patient, afebrile, no leukocytosis, procalcitonin within normal range. CT chest showed Left lower lobe infiltrate consistent with an infectious bronchiolitis. and completed rx for >7 days with antibx Levaquin. PT evaluated patient and recommended home with home PT. On day of discharge, patient was stable with close outpatient follow up.        Vital Signs Last 24 Hrs    T(C): 36.7 (17 Jun 2019 13:08), Max: 36.8 (17 Jun 2019 04:59)    T(F): 98.1 (17 Jun 2019 13:08), Max: 98.2 (17 Jun 2019 04:59)    HR: 92 (17 Jun 2019 13:08) (70 - 92)    BP: 116/77 (17 Jun 2019 13:08) (116/77 - 149/83)    BP(mean): --    RR: 20 (17 Jun 2019 13:08) (19 - 21)    SpO2: 94% (17 Jun 2019 13:08) (94% - 100%) 68 F with PMH of ALS, HTN, PEG tube, prior brain surgery who presents to the ED with worsening cough and SOB admitted for bronchitis. Symptoms started 9 days ago and have not improved. Patient went to [facility] where she had a negative CXR and was started on nebs and steroids in addition to Levaquin which she had been taking for 8 days outpatient. Patient received Levaquin in the ED. Pulm, Dr. Choi, evaluated patient, afebrile, no leukocytosis, procalcitonin within normal range. CT chest showed Left lower lobe infiltrate consistent with an infectious bronchiolitis. and completed rx for >7 days with antibx Levaquin. PT evaluated patient and recommended home with home PT. On day of discharge, patient was stable with close outpatient follow up.        Vital Signs Last 24 Hrs    T(C): 36.7 (17 Jun 2019 13:08), Max: 36.8 (17 Jun 2019 04:59)    T(F): 98.1 (17 Jun 2019 13:08), Max: 98.2 (17 Jun 2019 04:59)    HR: 92 (17 Jun 2019 13:08) (70 - 92)    BP: 116/77 (17 Jun 2019 13:08) (116/77 - 149/83)    BP(mean): --    RR: 20 (17 Jun 2019 13:08) (19 - 21)    SpO2: 94% (17 Jun 2019 13:08) (94% - 100%)        PHYSICAL EXAM:    GENERAL: NAD, well-developed    HEAD:  Atraumatic, Normocephalic    NECK: Supple, No JVD, Normal thyroid    HEART: Regular rate and rhythm; No murmurs, rubs, or gallops    RESPIRATORY: diminished BS B/L, No wheezing / rhonchi    ABDOMEN: Soft, Nontender, Nondistended; Bowel sounds present, PEG+    NEUROLOGY: non-verbal, A&Ox3, + facial droop, moving all extremities. Motor weakness of UE and LE    EXTREMITIES:  2+ Peripheral Pulses, No clubbing, cyanosis, or edema    SKIN: warm, dry, normal color, no rash or abnormal lesions

## 2019-06-17 NOTE — DISCHARGE NOTE PROVIDER - PROVIDER TOKENS
FREE:[LAST:[Arslan],FIRST:[Janet],PHONE:[phone]],FAX:[(   )    -],ADDRESS:[[address] [place] [address]]],FREE:[LAST:[Temi],FIRST:[Carrington],PHONE:[phone]:[(   )    -],ADDRESS:[[address] [place] [id number]]]

## 2019-06-17 NOTE — OCCUPATIONAL THERAPY INITIAL EVALUATION ADULT - ANTICIPATED DISCHARGE DISPOSITION, OT EVAL
Pt. reports family is working to acquire home assistance for pt. while son is at work./home w/ level of assist/home w/ OT

## 2019-06-17 NOTE — OCCUPATIONAL THERAPY INITIAL EVALUATION ADULT - IADL RETRAINING, OT EVAL
Patient will increase standing tolerance to 3-5 minutes for grooming at the sink within 2-4 sessions.

## 2019-06-17 NOTE — DIETITIAN INITIAL EVALUATION ADULT. - PROBLEM SELECTOR PLAN 1
Admit to telemetry.  CXR unremarkable and no signs of infection. Needs chest PT to help bring up secretions as patient unable to do so with ALS  - Continue abx. Pt on levaquin for 8 days already.   - Continue steroids, nebs, and robitussin

## 2019-06-17 NOTE — CHART NOTE - NSCHARTNOTEFT_GEN_A_CORE
Upon Nutritional Assessment by the Registered Dietitian your patient was determined to meet criteria / has evidence of the following diagnosis/diagnoses:          [ ]  Mild Protein Calorie Malnutrition        [ ]  Moderate Protein Calorie Malnutrition        [X] Severe Protein Calorie Malnutrition        [ ] Unspecified Protein Calorie Malnutrition        [ ] Underweight / BMI <19        [ ] Morbid Obesity / BMI > 40      Findings as based on:  [X] Comprehensive nutrition assessment   [X] Nutrition Focused Physical Exam: BMI 22.8 kg/m2; patient amenable to modified nutrition focused physical exam: temples (moderate), buccal (moderate), clavicle (moderate), temples (moderate), scapula (severe), interosseous (severe), thigh/patellar (moderate)  [X] Other: Malnutrition (severe, chronic) related to inadequate energy intake in setting of chronic illness ALS as evidenced by 32% wt loss x 2 years, moderate-severe signs of muscle and fat loss.      Nutrition Plan/Recommendations:    1) Change diet to dysphagia 3 soft cut-up with thin liquids with supplemental bolus feeds of IsoSource 1.5 (son to bring formula from home as we do not carry in house) 250 ml boluses q 6 hours. Maintain aspiration precautions.  2) RD to provide food preferences to assist with po intake of soft, cut-up foods. D/C health shakes as per patient's request.  3) Recommend formal SLP evaluation in house.   4) Monitor po intake, weights, BMs, skin integrity, tolerance of diet consistency, and nutrition related labs.   5) RD to remain available.    PROVIDER Section:     By signing this assessment you are acknowledging and agree with the diagnosis/diagnoses assigned by the Registered Dietitian    Lavern Foss RD

## 2019-06-17 NOTE — OCCUPATIONAL THERAPY INITIAL EVALUATION ADULT - GENERAL OBSERVATIONS, REHAB EVAL
Pt. found seated in bedside chair +chair alarm, telemonitor, and NCO2 @ 3.0 L Pt. found seated in bedside chair +chair alarm, telemonitor, and NCO2 @ 3.0 LPM.

## 2019-06-17 NOTE — CHART NOTE - NSCHARTNOTEFT_GEN_A_CORE
Called by RN for pt c/o "gurgly breathing", with adequate O2 saturations. Per chart review pt is being treated for suspected bronchitis, with Levaquin as ABX coverage. On physical exam, pt noted to have coarse breath sounds with audible secretions in diffuse lung fields. Will give 1x dose DUONEB at this time. RN to call with any changes, will continue to follow.    Vital Signs Last 24 Hrs  T(C): 36.5 (17 Jun 2019 20:02), Max: 36.8 (17 Jun 2019 04:59)  T(F): 97.7 (17 Jun 2019 20:02), Max: 98.2 (17 Jun 2019 04:59)  HR: 87 (17 Jun 2019 20:02) (70 - 92)  BP: 125/76 (17 Jun 2019 20:02) (116/77 - 149/83)  BP(mean): --  RR: 19 (17 Jun 2019 20:02) (19 - 20)  SpO2: 95% (17 Jun 2019 20:02) (94% - 100%)

## 2019-06-17 NOTE — OCCUPATIONAL THERAPY INITIAL EVALUATION ADULT - PERTINENT HX OF CURRENT PROBLEM, REHAB EVAL
67 y/o female admitted on 6/14 with worsening cough and SOB, + bronchitis. Pt. has ALS and PEG tube.

## 2019-06-17 NOTE — OCCUPATIONAL THERAPY INITIAL EVALUATION ADULT - ADDITIONAL COMMENTS
Pt. is non-verbal and uses a communication board to communicate. Pt. lives with adult son in private split level house. Pt. has 5+5 steps inside +rail. Pt. reports that she sleeps on the main floor in the den on a recliner chair. On the 2nd floor of the house, the pt. has a stall shower with grab bars and a removable shower seat. Pt. owns a rolling walker, small W/C, and SAC. Pt. ambulates with the SAC inside and outside of the house. Pt. reports she wears reading glasses and is right handed. Pt. reports that the family is in the process of acquiring assistance at home because her son works during the day. Pt. is non-verbal and uses a communication board to communicate. Pt. lives with adult son in private split level house. Pt. has 5+5 steps inside +rail. Pt. reports that she sleeps on the lower level in the den in a recliner chair. On the 2nd floor of the house, the pt. has a stall shower with grab bars and a removable shower seat. Pt. owns a rolling walker, shower chair, w/c, and SAC. Pt. ambulates with the SAC inside and outside of the house. Pt. reports she wears reading glasses and is right handed. Pt. reports that the family is in the process of acquiring assistance at home because her son works during the day.

## 2019-06-17 NOTE — DIETITIAN INITIAL EVALUATION ADULT. - NS AS NUTRI INTERV ENTERAL NUTRITION
Supplemental bolus feeds of IsoSource 1.5 (son to bring formula from home as we do not carry in house) 250 ml bolus q 6 hours. Maintain aspiration precautions.

## 2019-06-17 NOTE — DISCHARGE NOTE NURSING/CASE MANAGEMENT/SOCIAL WORK - NSDCDPATPORTLINK_GEN_ALL_CORE
You can access the [facility], offered by [facility], by registering with the following website: [email or web address]

## 2019-06-17 NOTE — OCCUPATIONAL THERAPY INITIAL EVALUATION ADULT - NS ASR FOLLOW COMMAND OT EVAL
Pt. is nonverbal and uses a communication board to communicate/able to follow single-step instructions/100% of the time

## 2019-06-17 NOTE — OCCUPATIONAL THERAPY INITIAL EVALUATION ADULT - BATHING, PREVIOUS LEVEL OF FUNCTION, OT EVAL
needs device/needed assist/Pt. has shower seat and grab bars. Pt. reports showering 1x/wk when son is around. Pt. has shower seat and grab bars. Pt. reports showering 1x/wk when son is around./needs device and assist

## 2019-06-17 NOTE — DIETITIAN INITIAL EVALUATION ADULT. - PATIENT PROFILE REVIEWED
PALLIATIVE CARE CONSULT NOTE    Patient: Jerardo Linkudhom Date: 5/15/2019   : 1935 Attending: Annetta Valdovinos*   83 year old female PCP: Robert Ugalde DO   MRN: [id number]  Date of admission: 2019     Consult requested by Jennifer Gilliam NP to assist with goal clarification in context of pulmonary disease.    HPI  83 year old female with history of end stage COPD, admitted 2019 due to fecal impaction, debility and severe respiratory distress. Per chart review, she has had advanced pulmonary disease for many years and has for at least the past two years been declining major interventions and/or declined for invasive procedures due to the severity of her disease. She has carried a DNR status since 2017 and has completed several advance directives that indicate a strong preference for no life-prolonging measures when she is near the end of her life.  On this admission she has been having rectal bleeding following efforts at disimpaction but has refused flex sig.  She has been stating to all providers and nursing staff since admission that she wants to be allowed to \"go home and go to heaven.\"  Palliative care was asked to meet with patient and her family to address her goals of care.      At the time of my evaluation, she is lying in bed.  Two daughters, Cierra and Helen, and her son Derik are present. She states she is in no pain.  Her breathing is comfortable on 4 liters via mask.     ROS  See full palliative care symptom assessment below.      ALLERGIES:   Allergen Reactions   • Sulfa Antibiotics VOMITING   • Enalapril Cough   • Grass Cough   • Pain Relief Nausea & Vomiting     Per pt \"pain medications make me deathly ill\".  Added to allergy list per pt request.       MEDICATIONS  Reviewed in EPIC and notable for:   • piperacillin-tazobactam (ZOSYN) extended interval IVPB  3.375 g Intravenous 3 times per day   • sodium chloride (PF)  2 mL Injection 2 times per day   • pantoprazole  40 mg  Intravenous Nightly   • polyethylene glycol  17 g Oral Daily   • docusate sodium-sennosides  2 tablet Oral Nightly   • Non-formulary    See Admin Instructions   • citalopram  40 mg Oral Daily   • magnesium lactate  84 mg Oral Daily with breakfast   • metoPROLOL succinate  50 mg Oral BID   • vitamin - therapeutic multivitamins w/minerals  1 tablet Oral Daily   • fluticasone-salmeterol  1 puff Inhalation BID Resp   • albuterol-ipratropium 2.5 mg/0.5 mg  3 mL Nebulization 4x Daily Resp   • methylPREDNISolone  20 mg Intravenous Q12H       PAST MEDICAL/SURGICAL HISTORY, SOCIAL HISTORY, FAMILY HISTORY  Reviewed in EPIC.  Key elements summarized in Palliative Care Assessment below.    OBJECTIVE  Vitals with min/max:  Vital Last Value 24 Hour Range   Temperature 99.2 °F (37.3 °C) (05/15/19 0400) Temp  Min: 98 °F (36.7 °C)  Max: 101 °F (38.3 °C)   Pulse 82 (05/15/19 0600) Pulse  Min: 72  Max: 86   Respiratory (!) 35 (05/15/19 0600) Resp  Min: 24  Max: 41   Non-Invasive  Blood Pressure (!) 140/99 (05/15/19 0600) BP  Min: 110/77  Max: 206/81   Pulse Oximetry 93 % (05/15/19 0740) SpO2  Min: 89 %  Max: 99 %   Arterial   Blood Pressure   No data recorded   LBM: 1 (05/14/19 1437)     Intake/Output Summary (Last 24 hours) at 5/15/2019 0827  Last data filed at 5/15/2019 0600  Gross per 24 hour   Intake 548 ml   Output 810 ml   Net -262 ml     Recent weights:   Weight    05/14/19 0200 05/14/19 0226 05/14/19 1528   Weight: 45.5 kg 45.5 kg 45.5 kg       PHYSICAL EXAM:   GEN:  Cachectic, frail elderly female in no acute respiratory distress.   MS: AOx3, good insight into medical situation.   EYES: no icterus or conjunctivitis  HENT: temporal wasting, poor dentition  PUL: slightly labored, normal rate, extremely diminished bilaterally  COR: RRR S1S2  ABD: Scaphoid, no BM appreciated  MSK: Severe muscle wasting of all extremities   SKIN:  Pale, warm, intact.   NEURO: CN II-XII grossly intact, able to move all 4  extremities    LABS:  Reviewed in EPIC and notable for:  Recent Labs   Lab 05/15/19  0418 05/14/19  0326 05/13/19  1631   SODIUM 141 139 141   POTASSIUM 4.0 4.4 4.4   BUN 33* 28* 30*   CREATININE 0.90 0.66 0.78   ALBUMIN 3.1*  --  3.0*   AST 31  --  22   GPT 20  --  19   ALKPT 52  --  57   BILIRUBIN 0.6  --  0.4     Recent Labs   Lab 05/15/19  0418 05/14/19  0326 05/13/19  1748 05/13/19  1631   WBC 6.2 8.7  --  6.2   HGB 8.3* 9.0*  --  8.9*    154  --  176   INR  --   --  1.1  --        IMAGING/PROCEDURES  Reviewed in EPIC and notable for:  CT abdomen 5/13/19  IMPRESSION:  1. Marked fecal impaction with evidence of wall thickening of the rectum  and surrounding inflammation. Disimpaction is suggested.  2. Constipation. There is no bowel obstruction, abscess, or free  intraperitoneal air.  3. Significant atherosclerotic disease of the abdominal aorta and  mesenteric vasculature. 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 There is  poststenotic dilatation of the celiac artery suggesting a significant at  least moderate stenosis. The inferior mesenteric artery does remain patent.  Correlation for symptoms of chronic mesenteric ischemia are suggested.  4. Left pleural effusion with left lower lobe atelectasis. There is  significant emphysematous lung disease.     TTE 5/14/19  Hypertrophic Obstructive Cardiomyopathy with calcific aortic valve stenosis.  Moderate to severe LVOT obstruction.  Severe aortic valve stenosis, mean gradient 48 mmHg.  Systolic anterior motion of the anterior mitral valve leaflet.  Mitral annular calcification. Moderate mitral valve regurgitation.  Mild-to-moderate tricuspid valve regurgitation. Right ventricular systolic pressure 56.2 mmHg.  Severely increased left ventricular wall thickness.  Grade II/IV diastolic dysfunction, moderately elevated filling pressures.  Normal left ventricular size and systolic function,  EF 74 %.  Mildly dilated ascending aorta, 3.6 cm.  Pleural effusion.    PALLIATIVE CARE ASSESSMENT, BY DOMAIN  MEDICAL Primary Dx: Acute on chronic resp failure due to COPD and on-going tobacco use  Secondary Dx:  Diastolic HF with severe PH; severe aortic stenosis   Co-morbidities:  Several protein calorie malnutrition, GI bleed, chronic anemia 2/2 same, debility. Constipation.     Symptoms:  Pain: absent.  Patient denies pain.  Dyspnea: present, with satisfactory control at rest.  Significant with even minimal exertion.   Other Symptoms: Agitation:  absent  Anorexia:  present - related to severe lung disease  Anxiety:  absent  Constipation:  present, with satisfactory control  Cough:  absent  Delirium:   absent  Fatigue:  present, with satisfactory control  Nausea:  absent    Functional status (current): Palliative Performance Scale:  30 (bedbound, unable to do any activity, extensive disease. Needs total assistance. Reduced intake. Fully conscious, drowsy, or confused.)    Prognosis (quality & quantity):  ·  Likely short weeks at best.  Hospice eligible.  · Basis for estimate: Clinical judgment   · High risk of death within 1 year? yes     PSYCHOSOCIAL,  SPIRITUAL,  CULTURAL     Current living situation: was residing at home in an apartment with her son Bhavesh.   Family and support system: .  Three adult children (all present today) involved in her care but not typically in agreement regarding the plan of care.  See discussion below.    Home care services:  Had home palliative care in the past.     Habits (tobacco, alcohol, drugs): current smoker with lengthy pack-year history.   Education/occupation history:  Retired CNA at [employer], [place], [place] by [employer]     CARE TRANSITIONS · Continuity health care providers: Robert Ugalde DO, Dr Bawaadam  ·   · Anticipated change in disposition? yes     ETHICAL,  LEGAL · Decision-making capacity: decisional.  · Advanced Care Planning Document: available as hard copy  · POAHC: names  her son Bhavesh as first agent, step son as alternate.  Not activated.   · Code Status preference, per document: DNR/DNI  · Does current code status align with advance directive? Yes     COUNSELING AND CARE COORDINATION  PLAN OF CARE Discussed with Dr Camejo, Jennifer Gilliam, nursing staff, CESAR Thomas.   GOALS OF CARE Goals of Care discussion  Participants: Patient, son Bhavesh, daughters Cierra and Helen.   Location: patient's room  Patient able to participate: Yes  Decision maker: Patient    Summary of discussion:  It was explained that patient has the right to opt for no further aggressive care, no invasive procedures, etc if this is her wish.  She stated repeatedly and clearly that this is her desire.  She is hoping to be able to go home with hospice care there. Unfortunately, the patient's family is not in agreement regarding the mechanics of getting a team of caregivers in the home 24/7.  With time they may be able to do so.  However, in the interim they would prefer to have patient go to a SNF with hospice care there.  There is no potential for rehabilitation and no desire to seek this.  They state that the patient has assets to pay for room and board at a SNF.  They would like hospice care at a SNF at least on an interim basis, possibly until patient expires.  While arrangements are pending for placement in a SNF they would like to continue with the antibiotic treatment.  However no invasive procedures, no escalation of care, no further tests, etc.      Goals of care:  comfort at end of life  Are goals of care deemed medically feasible? Yes    Decisions:  SNF referral with hospice care there.     TIME Total encounter time: >70 minutes, with the majority of this time spent counseling and coordinating care.      RECOMMENDATIONS  1. Discharge to SNF of family's choosing with hospice care there.  2. Continue IV antibiotics pending DC (while arrangement are being made for placement)  3. DNR-MMS  4. No further  procedures, no escalation of care.   5. Will need new state DNR bracelet prior to DC    Kady Bey NP  [phone]       yes

## 2019-06-17 NOTE — DISCHARGE NOTE PROVIDER - NSDCCPCAREPLAN_GEN_ALL_CORE_FT
PRINCIPAL DISCHARGE DIAGNOSIS  Diagnosis: Bronchitis  Assessment and Plan of Treatment: Your CT chest showed a left lower lobe infiltrate consistent with infectious bronchiolitis. You completed Levaquin outpatient. Continue your nebulizer treatments. Follow up with your primary care doctor within the week.      SECONDARY DISCHARGE DIAGNOSES  Diagnosis: ALS (amyotrophic lateral sclerosis)  Assessment and Plan of Treatment: continue home medication: riluzole and glycopyrrolate. Follow up with your neurologist.

## 2019-06-17 NOTE — DISCHARGE NOTE PROVIDER - CARE PROVIDER_API CALL
Janet Mcdaniels  [address] [place] [id number]  Phone: [phone]  Fax: (   )    -  Follow Up Time:     Carrington Membreno  [address] [address]  Phone: [phone]  Fax: (   )    -  Follow Up Time:

## 2019-06-17 NOTE — PHYSICAL THERAPY INITIAL EVALUATION ADULT - ADDITIONAL COMMENTS
Pt lives with her son in a split level house ( has steps) and ambulates independently with SC  and was independent with ADLs. Pt also has RW to use as needed

## 2019-06-17 NOTE — OCCUPATIONAL THERAPY INITIAL EVALUATION ADULT - TRANSFER TRAINING, PT EVAL
Patient will transfer to toilet with DME as needed with independently within 2-4 sessions. Patient will transfer to toilet with DME as needed with supervision within 2-4 sessions.

## 2019-06-17 NOTE — OCCUPATIONAL THERAPY INITIAL EVALUATION ADULT - RANGE OF MOTION EXAMINATION, UPPER EXTREMITY
Right UE Active ROM was WFL (within functional limits)/Left UE Active Assistive ROM was WFL  (within functional limits)/Left UE AAROM Shoulder Flexion: WFL to 90 degrees. except Left shd AAROM Shoulder Flexion: 0-90 degrees./Right UE Active ROM was WFL (within functional limits)/Left UE Active Assistive ROM was WFL  (within functional limits)

## 2019-06-17 NOTE — DIETITIAN INITIAL EVALUATION ADULT. - OTHER INFO
Patient seen for nutrition assessment per RN's request. Per chart, pt is 67 Y/O F with PMH of ALS, HTN admitted for bronchitis. States last BM x4 days ago, has constipation from rizozule medication. Discussed adequate hydration, fiber intake, ambulation as medically feasible. NKFA.

## 2019-06-17 NOTE — DIETITIAN INITIAL EVALUATION ADULT. - FACTORS AFF FOOD INTAKE
difficulty chewing/persistent constipation/ALS medication riluzole causes her constipation/other (specify)/difficulty swallowing

## 2019-06-17 NOTE — OCCUPATIONAL THERAPY INITIAL EVALUATION ADULT - LEVEL OF INDEPENDENCE: SHOWER, REHAB EVAL
Unable to assess due to decreased standing balance/unable to perform unable to perform/due to decreased standing balance

## 2019-06-19 LAB
CULTURE RESULTS: SIGNIFICANT CHANGE UP
CULTURE RESULTS: SIGNIFICANT CHANGE UP
SPECIMEN SOURCE: SIGNIFICANT CHANGE UP
SPECIMEN SOURCE: SIGNIFICANT CHANGE UP

## 2019-06-21 ENCOUNTER — APPOINTMENT (OUTPATIENT)
Dept: SLEEP CENTER | Facility: CLINIC | Age: 69
End: 2019-06-21
Payer: MEDICARE

## 2019-06-21 ENCOUNTER — OUTPATIENT (OUTPATIENT)
Dept: OUTPATIENT SERVICES | Facility: HOSPITAL | Age: 69
LOS: 1 days | End: 2019-06-21
Payer: MEDICARE

## 2019-06-21 PROCEDURE — 95810 POLYSOM 6/> YRS 4/> PARAM: CPT | Mod: 26

## 2019-06-21 PROCEDURE — 95810 POLYSOM 6/> YRS 4/> PARAM: CPT

## 2019-06-24 DIAGNOSIS — G47.33 OBSTRUCTIVE SLEEP APNEA (ADULT) (PEDIATRIC): ICD-10-CM

## 2019-06-24 PROBLEM — G12.21 AMYOTROPHIC LATERAL SCLEROSIS: Chronic | Status: ACTIVE | Noted: 2019-06-14

## 2019-06-24 PROBLEM — I10 ESSENTIAL (PRIMARY) HYPERTENSION: Chronic | Status: ACTIVE | Noted: 2019-06-14

## 2019-08-04 ENCOUNTER — RX RENEWAL (OUTPATIENT)
Age: 69
End: 2019-08-04

## 2019-09-03 ENCOUNTER — RX RENEWAL (OUTPATIENT)
Age: 69
End: 2019-09-03

## 2019-09-06 ENCOUNTER — MOBILE ON CALL (OUTPATIENT)
Age: 69
End: 2019-09-06

## 2019-09-11 ENCOUNTER — APPOINTMENT (OUTPATIENT)
Dept: SLEEP CENTER | Facility: CLINIC | Age: 69
End: 2019-09-11

## 2019-09-30 ENCOUNTER — APPOINTMENT (OUTPATIENT)
Dept: NEUROLOGY | Facility: CLINIC | Age: 69
End: 2019-09-30
Payer: MEDICARE

## 2019-09-30 VITALS
SYSTOLIC BLOOD PRESSURE: 111 MMHG | BODY MASS INDEX: 20.43 KG/M2 | HEIGHT: 62 IN | HEART RATE: 87 BPM | DIASTOLIC BLOOD PRESSURE: 73 MMHG | WEIGHT: 111 LBS

## 2019-09-30 PROCEDURE — 99214 OFFICE O/P EST MOD 30 MIN: CPT

## 2019-09-30 NOTE — ASSESSMENT
[FreeTextEntry1] : Patient is progressing and now should be evaluated for hospice in facility.  She will cont BiPAP as needed.\par Cont riluzole 50mg BID\par cont glycopyrrolate BID\par cont PEG feeds\par Will get standard WC while she awaits custom motorized WC\par She has endorsed to me and son that she wants trach and vent when the time comes. \par \par Will eval at [facility] clinic day in December

## 2019-09-30 NOTE — HISTORY OF PRESENT ILLNESS
[FreeTextEntry1] : Patient has no speech ability.  She has lost weight, arms and hands weaker, she can no longer independently walk, needs assist with everything.  No choking on saliva.  She gets SOB with activity.

## 2019-09-30 NOTE — PHYSICAL EXAM
[Person] : oriented to person [Time] : oriented to time [Place] : oriented to place [Short Term Intact] : short term memory intact [Remote Intact] : remote memory intact [Registration Intact] : recent registration memory intact [Visual Intact] : visual attention was ~T not ~L decreased [Concentration Intact] : normal concentrating ability [Naming Objects] : no difficulty naming common objects [Repeating Phrases] : no difficulty repeating a phrase [Writing A Sentence] : no difficulty writing a sentence [Fluency] : fluency intact [Comprehension] : comprehension intact [Reading] : reading intact [Past History] : adequate knowledge of personal past history [Cranial Nerves Oculomotor (III)] : extraocular motion intact [Cranial Nerves Optic (II)] : visual acuity intact bilaterally,  visual fields full to confrontation, pupils equal round and reactive to light [Cranial Nerves Facial (VII)] : face symmetrical [Cranial Nerves Trigeminal (V)] : facial sensation intact symmetrically [Cranial Nerves Glossopharyngeal (IX)] : tongue and palate midline [Cranial Nerves Vestibulocochlear (VIII)] : hearing was intact bilaterally [Cranial Nerves Accessory (XI - Cranial And Spinal)] : head turning and shoulder shrug symmetric [Motor Handedness Right-Handed] : the patient is right hand dominant [Hand Weakness Right] : the hand  was weak [3] : shoulder adduction 3/5 [Hand Weakness Left] : the hand  was weak [-4] : ankle inversion -4/5 [4] : ankle inversion 4/5 [Sensation Vibration Decrease] : vibration was intact [Sensation Tactile Decrease] : light touch was intact [Proprioception] : proprioception was intact [Past-pointing] : there was no past-pointing [Balance] : balance was intact [Tremor] : no tremor present [2+] : Biceps left 2+ [3+] : Patella left 3+ [Plantar Reflex Right Only] : abnormal on the right [4+] : Ankle jerk left 4+ [Plantar Reflex Left Only] : abnormal on the left [___] : [unfilled] ~Ubeats present on the right [___] : [unfilled] ~Ubeats present on the left [FreeTextEntry5] : tongue atrophy and fascics, not moving, loss of speech [FreeTextEntry6] : ALS-FRS 17/48, bilateral intrinsic hand atrophy, increased tone LLE [FreeTextEntry8] : walks with cane, unsteady and wide based

## 2019-10-14 ENCOUNTER — OTHER (OUTPATIENT)
Age: 69
End: 2019-10-14

## 2019-12-09 ENCOUNTER — APPOINTMENT (OUTPATIENT)
Dept: NEUROLOGY | Facility: CLINIC | Age: 69
End: 2019-12-09
Payer: MEDICARE

## 2019-12-09 VITALS
HEART RATE: 84 BPM | WEIGHT: 111 LBS | HEIGHT: 62 IN | DIASTOLIC BLOOD PRESSURE: 76 MMHG | BODY MASS INDEX: 20.43 KG/M2 | SYSTOLIC BLOOD PRESSURE: 115 MMHG

## 2019-12-09 PROCEDURE — 94010 BREATHING CAPACITY TEST: CPT

## 2019-12-09 PROCEDURE — 97163 PT EVAL HIGH COMPLEX 45 MIN: CPT

## 2019-12-09 PROCEDURE — 97166 OT EVAL MOD COMPLEX 45 MIN: CPT | Mod: GO

## 2019-12-09 PROCEDURE — 99215 OFFICE O/P EST HI 40 MIN: CPT | Mod: 25

## 2019-12-09 RX ORDER — GLYCOPYRROLATE 1 MG/1
1 TABLET ORAL TWICE DAILY
Qty: 180 | Refills: 2 | Status: DISCONTINUED | COMMUNITY
Start: 2019-09-30 | End: 2019-12-09

## 2019-12-09 RX ORDER — RILUZOLE 50 MG/10ML
50 LIQUID ORAL
Qty: 1 | Refills: 5 | Status: DISCONTINUED | COMMUNITY
Start: 2019-03-25 | End: 2019-12-09

## 2019-12-09 RX ORDER — RILUZOLE 50 MG/10ML
50 LIQUID ORAL
Qty: 1 | Refills: 5 | Status: DISCONTINUED | COMMUNITY
Start: 2019-01-17 | End: 2019-12-09

## 2019-12-09 NOTE — ASSESSMENT
[FreeTextEntry1] : Patient fairly stable since last visit, ALS-FRS score up 3 points due to different rater.  Motor exam shows mildly progressed weakness arms and legs.  No speech.  Cont PEG feeds.  \par \par Unable to complete PFT's so will follow symptomatically.  Home PT/OT recommended per evaluation today.  \par \par Patient seen today for face to face evaluation for power wheel chair, patient is unable to ambulate or propel a manual WC secondary due to ALS with decreased ROM and strength and coordination.

## 2019-12-09 NOTE — HISTORY OF PRESENT ILLNESS
[FreeTextEntry1] : Patient here with son, and live-in aide.  She has 7 days per week aide, but needs son at night because aide is sleeping.  Patient using iPad for AAC, no SOB.  Needs full assist, she is currently not on hospice.  Taking riluzole 50mg BID and tolerating.  Drooling improved with BID glycopyrrolate.  She endorsed SOB with exertion. \par \par Had eval for power WC

## 2019-12-09 NOTE — PHYSICAL EXAM
[Person] : oriented to person [Time] : oriented to time [Place] : oriented to place [Short Term Intact] : short term memory intact [Remote Intact] : remote memory intact [Registration Intact] : recent registration memory intact [Concentration Intact] : normal concentrating ability [Visual Intact] : visual attention was ~T not ~L decreased [Writing A Sentence] : no difficulty writing a sentence [Repeating Phrases] : no difficulty repeating a phrase [Naming Objects] : no difficulty naming common objects [Fluency] : fluency intact [Reading] : reading intact [Comprehension] : comprehension intact [Past History] : adequate knowledge of personal past history [Cranial Nerves Oculomotor (III)] : extraocular motion intact [Cranial Nerves Optic (II)] : visual acuity intact bilaterally,  visual fields full to confrontation, pupils equal round and reactive to light [Cranial Nerves Facial (VII)] : face symmetrical [Cranial Nerves Vestibulocochlear (VIII)] : hearing was intact bilaterally [Cranial Nerves Trigeminal (V)] : facial sensation intact symmetrically [Cranial Nerves Glossopharyngeal (IX)] : tongue and palate midline [Motor Handedness Right-Handed] : the patient is right hand dominant [Cranial Nerves Accessory (XI - Cranial And Spinal)] : head turning and shoulder shrug symmetric [Hand Weakness Right] : the hand  was weak [2] : shoulder abduction 2/5 [Hand Weakness Left] : the hand  was weak [1] : ankle plantar flexion 1/5 [3] : ankle inversion 3/5 [4] : knee flexion 4/5 [-4] : ankle inversion -4/5 [Sensation Vibration Decrease] : vibration was intact [Proprioception] : proprioception was intact [Sensation Tactile Decrease] : light touch was intact [Balance] : balance was intact [2+] : Biceps left 2+ [3+] : Patella left 3+ [4+] : Ankle jerk left 4+ [Plantar Reflex Right Only] : abnormal on the right [Plantar Reflex Left Only] : abnormal on the left [___] : [unfilled] ~Ubeats present on the right [___] : [unfilled] ~Ubeats present on the left [Past-pointing] : there was no past-pointing [FreeTextEntry5] : tongue atrophy and fascics, not moving, loss of speech [Tremor] : no tremor present [FreeTextEntry6] : ALS-FRS 20/48, bilateral intrinsic hand atrophy, increased tone LLE PFT:  [FreeTextEntry8] : walks with cane, unsteady and wide based

## 2020-01-02 ENCOUNTER — MOBILE ON CALL (OUTPATIENT)
Age: 70
End: 2020-01-02

## 2020-03-09 ENCOUNTER — APPOINTMENT (OUTPATIENT)
Dept: NEUROLOGY | Facility: CLINIC | Age: 70
End: 2020-03-09
Payer: MEDICARE

## 2020-03-09 ENCOUNTER — APPOINTMENT (OUTPATIENT)
Dept: NEUROLOGY | Facility: CLINIC | Age: 70
End: 2020-03-09

## 2020-03-09 PROCEDURE — 97164 PT RE-EVAL EST PLAN CARE: CPT | Mod: GP

## 2020-03-09 PROCEDURE — 92523 SPEECH SOUND LANG COMPREHEN: CPT | Mod: GN

## 2020-03-09 PROCEDURE — 99215 OFFICE O/P EST HI 40 MIN: CPT | Mod: 25

## 2020-03-09 PROCEDURE — 97168 OT RE-EVAL EST PLAN CARE: CPT | Mod: GO

## 2020-03-09 RX ORDER — RILUZOLE 50 MG/1
50 TABLET, FILM COATED ORAL
Qty: 60 | Refills: 5 | Status: DISCONTINUED | COMMUNITY
Start: 2018-04-09 | End: 2020-03-09

## 2020-03-09 NOTE — HISTORY OF PRESENT ILLNESS
[FreeTextEntry1] : Patient is here with  and feels stressed.  She denies depression or a desire to die.  She is advancing in her disease and is frustrated with ability to communicate.  \par \par Also has to repeat eye surgery.  She is having pain in legs and hands but doesn't want treatment.  She has BiPAP now and wants intubation when time comes for that.  \par \par Son is HCP.

## 2020-03-09 NOTE — ASSESSMENT
[FreeTextEntry1] : GELY CUENCA evaluated by me today  in multidisciplinary ALS clinic; requires evaluations today by PT/OT/speech and swallow therapists.  Social work needs addressed and goals of care reinforced. End of life care including health care proxy and patient wishes discussed.  She has declined with ALS-FRS by 3 points and advanced to max assist with ADL's\par \par Nutrition/dietary needs discussed and addressed  \par \par Pulmonary function assessed by respiratory therapy and spirometry.  Patient’s respiratory function poor, cannot perform PFT's, not compliant with NIV, will get better fitting face mask. \par \par Saliva management needs assessed\par \par PT/OT recommends:  Home OT/PT - will order through [facility]\par \par Speech/Swallow therapy recommends:  NPO, uses AAC with iPAD\par \par Continue riluzole 50m BID via PEG\par \par f/u in 3 months at ALS clinic\par \par

## 2020-03-09 NOTE — PHYSICAL EXAM
[Person] : oriented to person [Place] : oriented to place [Time] : oriented to time [Short Term Intact] : short term memory intact [Remote Intact] : remote memory intact [Registration Intact] : recent registration memory intact [Concentration Intact] : normal concentrating ability [Visual Intact] : visual attention was ~T not ~L decreased [Naming Objects] : no difficulty naming common objects [Repeating Phrases] : no difficulty repeating a phrase [Writing A Sentence] : no difficulty writing a sentence [Fluency] : fluency intact [Comprehension] : comprehension intact [Reading] : reading intact [Past History] : adequate knowledge of personal past history [Cranial Nerves Optic (II)] : visual acuity intact bilaterally,  visual fields full to confrontation, pupils equal round and reactive to light [Cranial Nerves Oculomotor (III)] : extraocular motion intact [Cranial Nerves Trigeminal (V)] : facial sensation intact symmetrically [Cranial Nerves Facial (VII)] : face symmetrical [Cranial Nerves Vestibulocochlear (VIII)] : hearing was intact bilaterally [Cranial Nerves Glossopharyngeal (IX)] : tongue and palate midline [Cranial Nerves Accessory (XI - Cranial And Spinal)] : head turning and shoulder shrug symmetric [Motor Handedness Right-Handed] : the patient is right hand dominant [Hand Weakness Right] : the hand  was weak [Hand Weakness Left] : the hand  was weak [2] : fingers abduction 2/5 left [4] : knee flexion 4/5 [1] : ankle plantar flexion 1/5 [3] : ankle dorsiflexion 3/5 [-4] : ankle inversion -4/5 [Sensation Tactile Decrease] : light touch was intact [Sensation Vibration Decrease] : vibration was intact [Proprioception] : proprioception was intact [Balance] : balance was intact [3+] : Patella left 3+ [2+] : Ankle jerk left 2+ [Plantar Reflex Right Only] : abnormal on the right [Plantar Reflex Left Only] : abnormal on the left [___] : [unfilled] ~Ubeats present on the right [___] : [unfilled] ~Ubeats present on the left [Past-pointing] : there was no past-pointing [Tremor] : no tremor present [FreeTextEntry5] : tongue atrophy and fascics, not moving, loss of speech [FreeTextEntry6] : ALS-FRS 17/48, bilateral intrinsic hand atrophy, increased tone LLE PFT:  [FreeTextEntry8] : walks with cane, unsteady and wide based

## 2020-03-22 RX ORDER — GLYCOPYRROLATE 2 MG/1
2 TABLET ORAL
Qty: 270 | Refills: 2 | Status: ACTIVE | COMMUNITY
Start: 2018-09-11 | End: 1900-01-01

## 2020-05-03 ENCOUNTER — RX RENEWAL (OUTPATIENT)
Age: 70
End: 2020-05-03

## 2020-05-03 RX ORDER — MECLIZINE HYDROCHLORIDE 12.5 MG/1
12.5 TABLET ORAL 3 TIMES DAILY
Qty: 90 | Refills: 1 | Status: ACTIVE | COMMUNITY
Start: 2020-03-26 | End: 1900-01-01

## 2020-07-02 ENCOUNTER — RX RENEWAL (OUTPATIENT)
Age: 70
End: 2020-07-02

## 2020-09-16 ENCOUNTER — RX RENEWAL (OUTPATIENT)
Age: 70
End: 2020-09-16

## 2020-09-21 ENCOUNTER — APPOINTMENT (OUTPATIENT)
Dept: NEUROLOGY | Facility: CLINIC | Age: 70
End: 2020-09-21
Payer: MEDICARE

## 2020-09-21 VITALS — HEART RATE: 80 BPM | DIASTOLIC BLOOD PRESSURE: 77 MMHG | SYSTOLIC BLOOD PRESSURE: 113 MMHG

## 2020-09-21 VITALS — TEMPERATURE: 97.2 F

## 2020-09-21 DIAGNOSIS — G12.21 AMYOTROPHIC LATERAL SCLEROSIS: ICD-10-CM

## 2020-09-21 PROCEDURE — 99214 OFFICE O/P EST MOD 30 MIN: CPT

## 2020-09-21 RX ORDER — MEXILETINE HYDROCHLORIDE 150 MG/1
150 CAPSULE ORAL 3 TIMES DAILY
Qty: 90 | Refills: 5 | Status: ACTIVE | COMMUNITY
Start: 2020-09-21 | End: 1900-01-01

## 2020-09-21 NOTE — PHYSICAL EXAM
[Person] : oriented to person [Place] : oriented to place [Time] : oriented to time [Short Term Intact] : short term memory intact [Remote Intact] : remote memory intact [Registration Intact] : recent registration memory intact [Concentration Intact] : normal concentrating ability [Visual Intact] : visual attention was ~T not ~L decreased [Naming Objects] : no difficulty naming common objects [Repeating Phrases] : no difficulty repeating a phrase [Writing A Sentence] : no difficulty writing a sentence [Fluency] : fluency intact [Comprehension] : comprehension intact [Reading] : reading intact [Past History] : adequate knowledge of personal past history [Cranial Nerves Optic (II)] : visual acuity intact bilaterally,  visual fields full to confrontation, pupils equal round and reactive to light [Cranial Nerves Oculomotor (III)] : extraocular motion intact [Cranial Nerves Trigeminal (V)] : facial sensation intact symmetrically [Cranial Nerves Facial (VII)] : face symmetrical [Cranial Nerves Vestibulocochlear (VIII)] : hearing was intact bilaterally [Cranial Nerves Glossopharyngeal (IX)] : tongue and palate midline [Cranial Nerves Accessory (XI - Cranial And Spinal)] : head turning and shoulder shrug symmetric [Motor Handedness Right-Handed] : the patient is right hand dominant [0] : wrist extension 0/5 [Hand Weakness Right] : the hand  was weak [Hand Weakness Left] : the hand  was weak [1] : hip extension 1/5 [2] : knee extension 2/5 [3] : ankle inversion 3/5 [Sensation Tactile Decrease] : light touch was intact [Sensation Vibration Decrease] : vibration was intact [Proprioception] : proprioception was intact [Balance] : balance was intact [Past-pointing] : there was no past-pointing [Tremor] : no tremor present [3+] : Patella left 3+ [2+] : Ankle jerk left 2+ [Plantar Reflex Right Only] : abnormal on the right [Plantar Reflex Left Only] : abnormal on the left [___] : [unfilled] ~Ubeats present on the right [___] : [unfilled] ~Ubeats present on the left [FreeTextEntry5] : tongue atrophy and fascics, not moving, loss of speech [FreeTextEntry6] : ALS-FRS 11/48, bilateral intrinsic hand atrophy, increased tone LLE  [FreeTextEntry8] : walks with cane, unsteady and wide based

## 2020-09-21 NOTE — ASSESSMENT
[FreeTextEntry1] : Patient is advancing and is nearing quadraplegia, but RR is 16 and she is not using accessory muscle for breathing.  Nonetheless she likely is nearing the point of needing invasive ventilation and I have again asked if she wants this and she will think about it - in the past she has endorsed to me that she doesn't want to live by ventilator assistance but to be made comfortable only. \par \par Will arrange for lesson to use eye tracking for AAC\par \par Will arrange for home PT and give mexiletine 150mg TID

## 2020-09-21 NOTE — HISTORY OF PRESENT ILLNESS
[FreeTextEntry1] : Patient currently has progressed to the point that she cannot speak or use hands for AAC.  She has a tobii dynavox but needs to learn how to use eye tracking feature.  \par \par PEG feeds going ok.  Currently PEG is leaking and is going to have it replaced.  She has NIV but doesn't like it and doesn't use it.  \par \par She urinates freq and one pill that can be crushed didn't help.  Getting painful cramps in legs\par \par

## 2020-09-24 ENCOUNTER — FORM ENCOUNTER (OUTPATIENT)
Age: 70
End: 2020-09-24

## 2020-10-10 ENCOUNTER — APPOINTMENT (OUTPATIENT)
Dept: DISASTER EMERGENCY | Facility: CLINIC | Age: 70
End: 2020-10-10

## 2020-10-10 DIAGNOSIS — Z01.818 ENCOUNTER FOR OTHER PREPROCEDURAL EXAMINATION: ICD-10-CM

## 2020-10-11 ENCOUNTER — TRANSCRIPTION ENCOUNTER (OUTPATIENT)
Age: 70
End: 2020-10-11

## 2020-10-11 LAB — SARS-COV-2 N GENE NPH QL NAA+PROBE: NOT DETECTED

## 2020-10-12 ENCOUNTER — RESULT REVIEW (OUTPATIENT)
Age: 70
End: 2020-10-12

## 2020-10-12 ENCOUNTER — OUTPATIENT (OUTPATIENT)
Dept: OUTPATIENT SERVICES | Facility: HOSPITAL | Age: 70
LOS: 1 days | End: 2020-10-12
Payer: MEDICARE

## 2020-10-12 DIAGNOSIS — R13.10 DYSPHAGIA, UNSPECIFIED: ICD-10-CM

## 2020-10-12 PROCEDURE — 43246 EGD PLACE GASTROSTOMY TUBE: CPT

## 2020-10-12 PROCEDURE — 88300 SURGICAL PATH GROSS: CPT

## 2020-10-12 PROCEDURE — L8699: CPT

## 2020-10-12 PROCEDURE — 88300 SURGICAL PATH GROSS: CPT | Mod: 26

## 2020-10-13 ENCOUNTER — RX RENEWAL (OUTPATIENT)
Age: 70
End: 2020-10-13

## 2020-10-13 RX ORDER — RILUZOLE 50 MG/1
50 TABLET, FILM COATED ORAL
Qty: 120 | Refills: 1 | Status: ACTIVE | COMMUNITY
Start: 2019-09-30 | End: 1900-01-01

## 2020-10-18 ENCOUNTER — INPATIENT (INPATIENT)
Facility: HOSPITAL | Age: 70
LOS: 2 days | Discharge: HOSPICE | DRG: 189 | End: 2020-10-21
Attending: STUDENT IN AN ORGANIZED HEALTH CARE EDUCATION/TRAINING PROGRAM | Admitting: STUDENT IN AN ORGANIZED HEALTH CARE EDUCATION/TRAINING PROGRAM
Payer: MEDICARE

## 2020-10-18 VITALS — WEIGHT: 130.07 LBS

## 2020-10-18 DIAGNOSIS — R09.02 HYPOXEMIA: ICD-10-CM

## 2020-10-18 DIAGNOSIS — Z98.890 OTHER SPECIFIED POSTPROCEDURAL STATES: Chronic | ICD-10-CM

## 2020-10-18 DIAGNOSIS — Z93.1 GASTROSTOMY STATUS: Chronic | ICD-10-CM

## 2020-10-18 DIAGNOSIS — Z96.612 PRESENCE OF LEFT ARTIFICIAL SHOULDER JOINT: Chronic | ICD-10-CM

## 2020-10-18 DIAGNOSIS — T68.XXXA HYPOTHERMIA, INITIAL ENCOUNTER: ICD-10-CM

## 2020-10-18 DIAGNOSIS — Z29.9 ENCOUNTER FOR PROPHYLACTIC MEASURES, UNSPECIFIED: ICD-10-CM

## 2020-10-18 DIAGNOSIS — J96.01 ACUTE RESPIRATORY FAILURE WITH HYPOXIA: ICD-10-CM

## 2020-10-18 DIAGNOSIS — R65.10 SYSTEMIC INFLAMMATORY RESPONSE SYNDROME (SIRS) OF NON-INFECTIOUS ORIGIN WITHOUT ACUTE ORGAN DYSFUNCTION: ICD-10-CM

## 2020-10-18 DIAGNOSIS — G12.21 AMYOTROPHIC LATERAL SCLEROSIS: ICD-10-CM

## 2020-10-18 DIAGNOSIS — I10 ESSENTIAL (PRIMARY) HYPERTENSION: ICD-10-CM

## 2020-10-18 DIAGNOSIS — K59.00 CONSTIPATION, UNSPECIFIED: ICD-10-CM

## 2020-10-18 DIAGNOSIS — R09.89 OTHER SPECIFIED SYMPTOMS AND SIGNS INVOLVING THE CIRCULATORY AND RESPIRATORY SYSTEMS: ICD-10-CM

## 2020-10-18 LAB
ALBUMIN SERPL ELPH-MCNC: 2.7 G/DL — LOW (ref 3.3–5)
ALBUMIN SERPL ELPH-MCNC: 3.1 G/DL — LOW (ref 3.3–5)
ALP SERPL-CCNC: 130 U/L — HIGH (ref 40–120)
ALP SERPL-CCNC: 150 U/L — HIGH (ref 40–120)
ALT FLD-CCNC: 36 U/L — SIGNIFICANT CHANGE UP (ref 12–78)
ALT FLD-CCNC: 43 U/L — SIGNIFICANT CHANGE UP (ref 12–78)
ANION GAP SERPL CALC-SCNC: 5 MMOL/L — SIGNIFICANT CHANGE UP (ref 5–17)
ANION GAP SERPL CALC-SCNC: 7 MMOL/L — SIGNIFICANT CHANGE UP (ref 5–17)
APPEARANCE UR: ABNORMAL
APTT BLD: 42.9 SEC — HIGH (ref 27.5–35.5)
APTT BLD: 48.2 SEC — HIGH (ref 27.5–35.5)
AST SERPL-CCNC: 18 U/L — SIGNIFICANT CHANGE UP (ref 15–37)
AST SERPL-CCNC: 21 U/L — SIGNIFICANT CHANGE UP (ref 15–37)
BACTERIA # UR AUTO: ABNORMAL
BASE EXCESS BLDA CALC-SCNC: 6.5 MMOL/L — HIGH (ref -2–2)
BASE EXCESS BLDA CALC-SCNC: 7.5 MMOL/L — HIGH (ref -2–2)
BASOPHILS # BLD AUTO: 0.01 K/UL — SIGNIFICANT CHANGE UP (ref 0–0.2)
BASOPHILS # BLD AUTO: 0.02 K/UL — SIGNIFICANT CHANGE UP (ref 0–0.2)
BASOPHILS NFR BLD AUTO: 0.1 % — SIGNIFICANT CHANGE UP (ref 0–2)
BASOPHILS NFR BLD AUTO: 0.2 % — SIGNIFICANT CHANGE UP (ref 0–2)
BILIRUB SERPL-MCNC: 0.6 MG/DL — SIGNIFICANT CHANGE UP (ref 0.2–1.2)
BILIRUB SERPL-MCNC: 0.8 MG/DL — SIGNIFICANT CHANGE UP (ref 0.2–1.2)
BILIRUB UR-MCNC: NEGATIVE — SIGNIFICANT CHANGE UP
BLOOD GAS COMMENTS ARTERIAL: SIGNIFICANT CHANGE UP
BUN SERPL-MCNC: 17 MG/DL — SIGNIFICANT CHANGE UP (ref 7–23)
BUN SERPL-MCNC: 20 MG/DL — SIGNIFICANT CHANGE UP (ref 7–23)
CALCIUM SERPL-MCNC: 9.1 MG/DL — SIGNIFICANT CHANGE UP (ref 8.5–10.1)
CALCIUM SERPL-MCNC: 9.9 MG/DL — SIGNIFICANT CHANGE UP (ref 8.5–10.1)
CHLORIDE SERPL-SCNC: 102 MMOL/L — SIGNIFICANT CHANGE UP (ref 96–108)
CHLORIDE SERPL-SCNC: 95 MMOL/L — LOW (ref 96–108)
CK MB BLD-MCNC: 24.2 % — HIGH (ref 0–3.5)
CK MB CFR SERPL CALC: 17.2 NG/ML — HIGH (ref 0–3.6)
CK SERPL-CCNC: 71 U/L — SIGNIFICANT CHANGE UP (ref 26–192)
CO2 SERPL-SCNC: 32 MMOL/L — HIGH (ref 22–31)
CO2 SERPL-SCNC: 35 MMOL/L — HIGH (ref 22–31)
COLOR SPEC: SIGNIFICANT CHANGE UP
COMMENT - URINE: SIGNIFICANT CHANGE UP
CREAT SERPL-MCNC: 0.34 MG/DL — LOW (ref 0.5–1.3)
CREAT SERPL-MCNC: 0.45 MG/DL — LOW (ref 0.5–1.3)
D DIMER BLD IA.RAPID-MCNC: 180 NG/ML DDU — SIGNIFICANT CHANGE UP
DIFF PNL FLD: ABNORMAL
EOSINOPHIL # BLD AUTO: 0 K/UL — SIGNIFICANT CHANGE UP (ref 0–0.5)
EOSINOPHIL # BLD AUTO: 0 K/UL — SIGNIFICANT CHANGE UP (ref 0–0.5)
EOSINOPHIL NFR BLD AUTO: 0 % — SIGNIFICANT CHANGE UP (ref 0–6)
EOSINOPHIL NFR BLD AUTO: 0 % — SIGNIFICANT CHANGE UP (ref 0–6)
EPI CELLS # UR: SIGNIFICANT CHANGE UP
GLUCOSE SERPL-MCNC: 158 MG/DL — HIGH (ref 70–99)
GLUCOSE SERPL-MCNC: 94 MG/DL — SIGNIFICANT CHANGE UP (ref 70–99)
GLUCOSE UR QL: NEGATIVE — SIGNIFICANT CHANGE UP
HCO3 BLDA-SCNC: 29 MMOL/L — HIGH (ref 23–27)
HCO3 BLDA-SCNC: 30 MMOL/L — HIGH (ref 23–27)
HCT VFR BLD CALC: 33.3 % — LOW (ref 34.5–45)
HCT VFR BLD CALC: 36.5 % — SIGNIFICANT CHANGE UP (ref 34.5–45)
HGB BLD-MCNC: 12.2 G/DL — SIGNIFICANT CHANGE UP (ref 11.5–15.5)
HGB BLD-MCNC: 13.5 G/DL — SIGNIFICANT CHANGE UP (ref 11.5–15.5)
HOROWITZ INDEX BLDA+IHG-RTO: 100 — SIGNIFICANT CHANGE UP
HOROWITZ INDEX BLDA+IHG-RTO: SIGNIFICANT CHANGE UP
IMM GRANULOCYTES NFR BLD AUTO: 0.4 % — SIGNIFICANT CHANGE UP (ref 0–1.5)
IMM GRANULOCYTES NFR BLD AUTO: 0.5 % — SIGNIFICANT CHANGE UP (ref 0–1.5)
INR BLD: 1.06 RATIO — SIGNIFICANT CHANGE UP (ref 0.88–1.16)
INR BLD: 1.07 RATIO — SIGNIFICANT CHANGE UP (ref 0.88–1.16)
KETONES UR-MCNC: NEGATIVE — SIGNIFICANT CHANGE UP
LACTATE SERPL-SCNC: 1.2 MMOL/L — SIGNIFICANT CHANGE UP (ref 0.7–2)
LEUKOCYTE ESTERASE UR-ACNC: ABNORMAL
LYMPHOCYTES # BLD AUTO: 0.41 K/UL — LOW (ref 1–3.3)
LYMPHOCYTES # BLD AUTO: 0.6 K/UL — LOW (ref 1–3.3)
LYMPHOCYTES # BLD AUTO: 4.9 % — LOW (ref 13–44)
LYMPHOCYTES # BLD AUTO: 8 % — LOW (ref 13–44)
MAGNESIUM SERPL-MCNC: 1.9 MG/DL — SIGNIFICANT CHANGE UP (ref 1.6–2.6)
MCHC RBC-ENTMCNC: 35.3 PG — HIGH (ref 27–34)
MCHC RBC-ENTMCNC: 35.4 PG — HIGH (ref 27–34)
MCHC RBC-ENTMCNC: 36.6 GM/DL — HIGH (ref 32–36)
MCHC RBC-ENTMCNC: 37 GM/DL — HIGH (ref 32–36)
MCV RBC AUTO: 95.8 FL — SIGNIFICANT CHANGE UP (ref 80–100)
MCV RBC AUTO: 96.2 FL — SIGNIFICANT CHANGE UP (ref 80–100)
MONOCYTES # BLD AUTO: 0.42 K/UL — SIGNIFICANT CHANGE UP (ref 0–0.9)
MONOCYTES # BLD AUTO: 0.53 K/UL — SIGNIFICANT CHANGE UP (ref 0–0.9)
MONOCYTES NFR BLD AUTO: 5 % — SIGNIFICANT CHANGE UP (ref 2–14)
MONOCYTES NFR BLD AUTO: 7.1 % — SIGNIFICANT CHANGE UP (ref 2–14)
NEUTROPHILS # BLD AUTO: 6.3 K/UL — SIGNIFICANT CHANGE UP (ref 1.8–7.4)
NEUTROPHILS # BLD AUTO: 7.47 K/UL — HIGH (ref 1.8–7.4)
NEUTROPHILS NFR BLD AUTO: 84.4 % — HIGH (ref 43–77)
NEUTROPHILS NFR BLD AUTO: 89.4 % — HIGH (ref 43–77)
NITRITE UR-MCNC: NEGATIVE — SIGNIFICANT CHANGE UP
NRBC # BLD: 0 /100 WBCS — SIGNIFICANT CHANGE UP (ref 0–0)
NRBC # BLD: 0 /100 WBCS — SIGNIFICANT CHANGE UP (ref 0–0)
PCO2 BLDA: 70 MMHG — CRITICAL HIGH (ref 32–46)
PCO2 BLDA: 71 MMHG — CRITICAL HIGH (ref 32–46)
PH BLDA: 7.29 — LOW (ref 7.35–7.45)
PH BLDA: 7.3 — LOW (ref 7.35–7.45)
PH UR: 7 — SIGNIFICANT CHANGE UP (ref 5–8)
PHOSPHATE SERPL-MCNC: 3.6 MG/DL — SIGNIFICANT CHANGE UP (ref 2.5–4.5)
PLATELET # BLD AUTO: 210 K/UL — SIGNIFICANT CHANGE UP (ref 150–400)
PLATELET # BLD AUTO: 252 K/UL — SIGNIFICANT CHANGE UP (ref 150–400)
PO2 BLDA: 112 MMHG — HIGH (ref 74–108)
PO2 BLDA: 267 MMHG — HIGH (ref 74–108)
POTASSIUM SERPL-MCNC: 3.6 MMOL/L — SIGNIFICANT CHANGE UP (ref 3.5–5.3)
POTASSIUM SERPL-MCNC: 3.7 MMOL/L — SIGNIFICANT CHANGE UP (ref 3.5–5.3)
POTASSIUM SERPL-SCNC: 3.6 MMOL/L — SIGNIFICANT CHANGE UP (ref 3.5–5.3)
POTASSIUM SERPL-SCNC: 3.7 MMOL/L — SIGNIFICANT CHANGE UP (ref 3.5–5.3)
PROCALCITONIN SERPL-MCNC: 0.06 NG/ML — HIGH (ref 0–0.04)
PROT SERPL-MCNC: 8.7 G/DL — HIGH (ref 6–8.3)
PROT SERPL-MCNC: 9.8 G/DL — HIGH (ref 6–8.3)
PROT UR-MCNC: NEGATIVE — SIGNIFICANT CHANGE UP
PROTHROM AB SERPL-ACNC: 12.4 SEC — SIGNIFICANT CHANGE UP (ref 10.6–13.6)
PROTHROM AB SERPL-ACNC: 12.5 SEC — SIGNIFICANT CHANGE UP (ref 10.6–13.6)
RBC # BLD: 3.46 M/UL — LOW (ref 3.8–5.2)
RBC # BLD: 3.81 M/UL — SIGNIFICANT CHANGE UP (ref 3.8–5.2)
RBC # FLD: 12.3 % — SIGNIFICANT CHANGE UP (ref 10.3–14.5)
RBC # FLD: 12.4 % — SIGNIFICANT CHANGE UP (ref 10.3–14.5)
SAO2 % BLDA: 97 % — HIGH (ref 92–96)
SAO2 % BLDA: 99 % — HIGH (ref 92–96)
SARS-COV-2 IGG SERPL QL IA: NEGATIVE — SIGNIFICANT CHANGE UP
SARS-COV-2 IGM SERPL IA-ACNC: 0.1 INDEX — SIGNIFICANT CHANGE UP
SARS-COV-2 RNA SPEC QL NAA+PROBE: SIGNIFICANT CHANGE UP
SODIUM SERPL-SCNC: 135 MMOL/L — SIGNIFICANT CHANGE UP (ref 135–145)
SODIUM SERPL-SCNC: 141 MMOL/L — SIGNIFICANT CHANGE UP (ref 135–145)
SP GR SPEC: 1.01 — SIGNIFICANT CHANGE UP (ref 1.01–1.02)
TROPONIN I SERPL-MCNC: 0.03 NG/ML — SIGNIFICANT CHANGE UP (ref 0.01–0.04)
UROBILINOGEN FLD QL: NEGATIVE — SIGNIFICANT CHANGE UP
WBC # BLD: 7.47 K/UL — SIGNIFICANT CHANGE UP (ref 3.8–10.5)
WBC # BLD: 8.36 K/UL — SIGNIFICANT CHANGE UP (ref 3.8–10.5)
WBC # FLD AUTO: 7.47 K/UL — SIGNIFICANT CHANGE UP (ref 3.8–10.5)
WBC # FLD AUTO: 8.36 K/UL — SIGNIFICANT CHANGE UP (ref 3.8–10.5)
WBC UR QL: SIGNIFICANT CHANGE UP

## 2020-10-18 PROCEDURE — 93970 EXTREMITY STUDY: CPT | Mod: 26

## 2020-10-18 PROCEDURE — 93010 ELECTROCARDIOGRAM REPORT: CPT

## 2020-10-18 PROCEDURE — 93306 TTE W/DOPPLER COMPLETE: CPT | Mod: 26

## 2020-10-18 PROCEDURE — 71045 X-RAY EXAM CHEST 1 VIEW: CPT | Mod: 26

## 2020-10-18 PROCEDURE — 99233 SBSQ HOSP IP/OBS HIGH 50: CPT | Mod: GC

## 2020-10-18 PROCEDURE — 99223 1ST HOSP IP/OBS HIGH 75: CPT | Mod: GC,AI

## 2020-10-18 PROCEDURE — 99285 EMERGENCY DEPT VISIT HI MDM: CPT

## 2020-10-18 PROCEDURE — 99497 ADVNCD CARE PLAN 30 MIN: CPT

## 2020-10-18 RX ORDER — ROBINUL 0.2 MG/ML
2 INJECTION INTRAMUSCULAR; INTRAVENOUS DAILY
Refills: 0 | Status: DISCONTINUED | OUTPATIENT
Start: 2020-10-18 | End: 2020-10-21

## 2020-10-18 RX ORDER — POLYETHYLENE GLYCOL 3350 17 G/17G
17 POWDER, FOR SOLUTION ORAL
Refills: 0 | Status: DISCONTINUED | OUTPATIENT
Start: 2020-10-18 | End: 2020-10-21

## 2020-10-18 RX ORDER — POTASSIUM CHLORIDE 20 MEQ
10 PACKET (EA) ORAL
Refills: 0 | Status: COMPLETED | OUTPATIENT
Start: 2020-10-18 | End: 2020-10-18

## 2020-10-18 RX ORDER — ENOXAPARIN SODIUM 100 MG/ML
40 INJECTION SUBCUTANEOUS DAILY
Refills: 0 | Status: DISCONTINUED | OUTPATIENT
Start: 2020-10-18 | End: 2020-10-21

## 2020-10-18 RX ORDER — ROBINUL 0.2 MG/ML
1 INJECTION INTRAMUSCULAR; INTRAVENOUS
Qty: 0 | Refills: 0 | DISCHARGE

## 2020-10-18 RX ORDER — AMLODIPINE BESYLATE 2.5 MG/1
5 TABLET ORAL DAILY
Refills: 0 | Status: DISCONTINUED | OUTPATIENT
Start: 2020-10-18 | End: 2020-10-20

## 2020-10-18 RX ORDER — RILUZOLE 50 MG/1
50 TABLET ORAL
Refills: 0 | Status: DISCONTINUED | OUTPATIENT
Start: 2020-10-18 | End: 2020-10-21

## 2020-10-18 RX ORDER — CHLORHEXIDINE GLUCONATE 213 G/1000ML
1 SOLUTION TOPICAL
Refills: 0 | Status: DISCONTINUED | OUTPATIENT
Start: 2020-10-18 | End: 2020-10-20

## 2020-10-18 RX ORDER — ESCITALOPRAM OXALATE 10 MG/1
1 TABLET, FILM COATED ORAL
Qty: 0 | Refills: 0 | DISCHARGE

## 2020-10-18 RX ORDER — ACETAMINOPHEN 500 MG
650 TABLET ORAL EVERY 6 HOURS
Refills: 0 | Status: DISCONTINUED | OUTPATIENT
Start: 2020-10-18 | End: 2020-10-21

## 2020-10-18 RX ORDER — SODIUM CHLORIDE 9 MG/ML
1000 INJECTION INTRAMUSCULAR; INTRAVENOUS; SUBCUTANEOUS ONCE
Refills: 0 | Status: COMPLETED | OUTPATIENT
Start: 2020-10-18 | End: 2020-10-18

## 2020-10-18 RX ORDER — LACTULOSE 10 G/15ML
200 SOLUTION ORAL ONCE
Refills: 0 | Status: COMPLETED | OUTPATIENT
Start: 2020-10-18 | End: 2020-10-18

## 2020-10-18 RX ORDER — SENNA PLUS 8.6 MG/1
2 TABLET ORAL AT BEDTIME
Refills: 0 | Status: DISCONTINUED | OUTPATIENT
Start: 2020-10-18 | End: 2020-10-21

## 2020-10-18 RX ORDER — PANTOPRAZOLE SODIUM 20 MG/1
40 TABLET, DELAYED RELEASE ORAL DAILY
Refills: 0 | Status: DISCONTINUED | OUTPATIENT
Start: 2020-10-18 | End: 2020-10-21

## 2020-10-18 RX ORDER — ENOXAPARIN SODIUM 100 MG/ML
40 INJECTION SUBCUTANEOUS AT BEDTIME
Refills: 0 | Status: DISCONTINUED | OUTPATIENT
Start: 2020-10-18 | End: 2020-10-18

## 2020-10-18 RX ORDER — ALBUTEROL 90 UG/1
3 AEROSOL, METERED ORAL
Qty: 0 | Refills: 0 | DISCHARGE

## 2020-10-18 RX ORDER — AMLODIPINE BESYLATE 2.5 MG/1
1 TABLET ORAL
Qty: 0 | Refills: 0 | DISCHARGE

## 2020-10-18 RX ORDER — SODIUM CHLORIDE 9 MG/ML
1000 INJECTION, SOLUTION INTRAVENOUS
Refills: 0 | Status: DISCONTINUED | OUTPATIENT
Start: 2020-10-18 | End: 2020-10-20

## 2020-10-18 RX ORDER — ROBINUL 0.2 MG/ML
1 INJECTION INTRAMUSCULAR; INTRAVENOUS AT BEDTIME
Refills: 0 | Status: DISCONTINUED | OUTPATIENT
Start: 2020-10-18 | End: 2020-10-21

## 2020-10-18 RX ORDER — ENOXAPARIN SODIUM 100 MG/ML
30 INJECTION SUBCUTANEOUS DAILY
Refills: 0 | Status: DISCONTINUED | OUTPATIENT
Start: 2020-10-18 | End: 2020-10-18

## 2020-10-18 RX ORDER — LINACLOTIDE 145 UG/1
290 CAPSULE, GELATIN COATED ORAL
Refills: 0 | Status: DISCONTINUED | OUTPATIENT
Start: 2020-10-18 | End: 2020-10-19

## 2020-10-18 RX ORDER — SODIUM CHLORIDE 9 MG/ML
500 INJECTION INTRAMUSCULAR; INTRAVENOUS; SUBCUTANEOUS ONCE
Refills: 0 | Status: COMPLETED | OUTPATIENT
Start: 2020-10-18 | End: 2020-10-18

## 2020-10-18 RX ORDER — POLYETHYLENE GLYCOL 3350 17 G/17G
17 POWDER, FOR SOLUTION ORAL DAILY
Refills: 0 | Status: DISCONTINUED | OUTPATIENT
Start: 2020-10-18 | End: 2020-10-18

## 2020-10-18 RX ORDER — ESCITALOPRAM OXALATE 10 MG/1
20 TABLET, FILM COATED ORAL DAILY
Refills: 0 | Status: DISCONTINUED | OUTPATIENT
Start: 2020-10-18 | End: 2020-10-21

## 2020-10-18 RX ADMIN — SODIUM CHLORIDE 1000 MILLILITER(S): 9 INJECTION INTRAMUSCULAR; INTRAVENOUS; SUBCUTANEOUS at 00:15

## 2020-10-18 RX ADMIN — Medication 0.25 MILLIGRAM(S): at 03:01

## 2020-10-18 RX ADMIN — AMLODIPINE BESYLATE 5 MILLIGRAM(S): 2.5 TABLET ORAL at 07:12

## 2020-10-18 RX ADMIN — ENOXAPARIN SODIUM 40 MILLIGRAM(S): 100 INJECTION SUBCUTANEOUS at 11:28

## 2020-10-18 RX ADMIN — SODIUM CHLORIDE 30 MILLILITER(S): 9 INJECTION, SOLUTION INTRAVENOUS at 18:07

## 2020-10-18 RX ADMIN — POLYETHYLENE GLYCOL 3350 17 GRAM(S): 17 POWDER, FOR SOLUTION ORAL at 18:10

## 2020-10-18 RX ADMIN — ROBINUL 1 MILLIGRAM(S): 0.2 INJECTION INTRAMUSCULAR; INTRAVENOUS at 22:24

## 2020-10-18 RX ADMIN — RILUZOLE 50 MILLIGRAM(S): 50 TABLET ORAL at 18:08

## 2020-10-18 RX ADMIN — Medication 100 MILLIEQUIVALENT(S): at 16:18

## 2020-10-18 RX ADMIN — ROBINUL 2 MILLIGRAM(S): 0.2 INJECTION INTRAMUSCULAR; INTRAVENOUS at 11:27

## 2020-10-18 RX ADMIN — Medication 100 MILLIEQUIVALENT(S): at 13:27

## 2020-10-18 RX ADMIN — Medication 650 MILLIGRAM(S): at 22:24

## 2020-10-18 RX ADMIN — LACTULOSE 200 GRAM(S): 10 SOLUTION ORAL at 08:19

## 2020-10-18 RX ADMIN — SODIUM CHLORIDE 1000 MILLILITER(S): 9 INJECTION INTRAMUSCULAR; INTRAVENOUS; SUBCUTANEOUS at 05:27

## 2020-10-18 RX ADMIN — PANTOPRAZOLE SODIUM 40 MILLIGRAM(S): 20 TABLET, DELAYED RELEASE ORAL at 11:28

## 2020-10-18 RX ADMIN — Medication 100 MILLIEQUIVALENT(S): at 11:27

## 2020-10-18 RX ADMIN — SENNA PLUS 2 TABLET(S): 8.6 TABLET ORAL at 23:26

## 2020-10-18 RX ADMIN — Medication 650 MILLIGRAM(S): at 22:54

## 2020-10-18 RX ADMIN — ESCITALOPRAM OXALATE 20 MILLIGRAM(S): 10 TABLET, FILM COATED ORAL at 11:28

## 2020-10-18 NOTE — ED ADULT NURSE REASSESSMENT NOTE - NS ED NURSE REASSESS COMMENT FT1
Pt resting comfortable at this time. Tolerating Bipap. pt Hypotensive. ICU Bryon Aguilar made aware. orders noted and initiated,

## 2020-10-18 NOTE — H&P ADULT - PROBLEM SELECTOR PLAN 2
Pt presented with 3/4 SIRS criteria, with unknown source  - Likely 2/2 to worsening ALS/autonomic dysfunction vs PE  - F/u CT angio and D-dimer  - Continue to monitor hemodynamics  - Pt accepted to ICU for further management Chronic, On Riluzole and glycopyrrolate at home  - Continue glycopyrrolate  - Son to bring Riluzole from home Chronic, On Riluzole and glycopyrrolate at home  - Continue glycopyrrolate  - Son to bring Riluzole from home  - Chest PT, Incentive bouchra if tolerated, Turn and position, NIPPV  - poor overall prognosis - GOC w/ palliative team

## 2020-10-18 NOTE — PROVIDER CONTACT NOTE (EICU) - ASSESSMENT
Pt was placed on NRB and then BiPAP. ABG showed hypoxia/hypercapnia. afebrile. no leukocytosis. Pt refused CTA to rule out PE.

## 2020-10-18 NOTE — H&P ADULT - ATTENDING COMMENTS
critically ill at risk for abrupt decompensation  admit to ICU. Start NIPPV with Bi-Level PAP. High risk for intubation.  D/w patient and son, Lambert Dale Jr  D/w ADAIR Robertson

## 2020-10-18 NOTE — DIETITIAN INITIAL EVALUATION ADULT. - PROBLEM SELECTOR PLAN 4
Per son pt has been constipated since PEG placement (w/ Dr. ROGER Timmons) with minimal BMs, last on 10/17. CXR with evidence of significant constipation.  - GI (Dr. Blake) consulted, f/u recs  - Pts son to bring linzess from home

## 2020-10-18 NOTE — H&P ADULT - NSHPSOCIALHISTORY_GEN_ALL_CORE
Lives with son, does not ambulate 2/2 to ALS, denies tobacco, alcohol, drugs Lives with son, does not ambulate 2/2 to ALS  Denies tobacco, alcohol, drugs  NPO, PEG tube for feedings. Uses AAC to communicate via iPad.

## 2020-10-18 NOTE — H&P ADULT - PROBLEM SELECTOR PLAN 3
Per son pt has been constipation since PEG placement with minimal BMs, last on 10/17  - GI (Dr. Castillo) consulted, f/u recs  - Pts son to bring linzess from home patient with hypothermia - unclear if infectious or autonomic dysfunction  Pt presented with 3/4 SIRS criteria, with unknown source   - does not appear to have active infection. follow up UA, blood/urine cultures  - Likely 2/2 to worsening ALS/autonomic dysfunction vs PE  - F/u D-Dimer, US Duplex LE, CT angio if able  - Continue to monitor hemodynamics  - Pt accepted to ICU for further management

## 2020-10-18 NOTE — ED ADULT NURSE NOTE - OBJECTIVE STATEMENT
69 yr old female presents to the ED with c/o sob since 2200 on 10/17/2020. A+O x 4. Nonverbal due to ALS. Son at the bedside. Pt lives at home with the son and an aid. Son reports patient was here on oct 12, 2020 for peg tube replacement and has been constipated since then. LBM yesterday 10/17/2020. Son reports patient has been sob since 2200. Pt was hypoxic on admission 89% on room air. O2 provided via NC 4 LPM. Wet non productive cough noted. Rhonchi and diminished breath sounds noted bilaterally. Respirations shallow. Sinus tachycardia on ekg and monitor. 93.5 rectal temp on admission. warming blanket applied. Incontinent of urine and stool. Abdomen soft, and non distended but has generalized abdominal tenderness. peg tube LUQ noted. sarrounding skin not red or hot to the touch. skin cool and normal for race. will continue to monitor. 69 yr old female presents to the ED with c/o sob since 2200 on 10/17/2020. A+O x 4. Nonverbal due to ALS. Son at the bedside. Pt lives at home with the son and an aid. Son reports patient was here on oct 12, 2020 for peg tube replacement and has been constipated since then. LBM yesterday 10/17/2020. Son reports patient has been sob since 2200. Pt was hypoxic on admission 88% on room air. O2 provided via NC 4 LPM. Wet non productive cough noted. Rhonchous breath sounds noted bilaterally. Oral secretions noted and patient unable to clear them. Respirations shallow. Sinus tachycardia on ekg and monitor. 93.5 rectal temp on admission. warming blanket applied. Incontinent of urine and stool. Has generalized abdominal tenderness. peg tube LUQ noted. sarrounding skin not red or hot to the touch. skin cool and normal for race. will continue to monitor.

## 2020-10-18 NOTE — H&P ADULT - NSHPOUTPATIENTPROVIDERS_GEN_ALL_CORE
PMD Dr. Janet Mcdaniels  Neuro/ALS Dr. Da Silva  Gastroenterology Dr. Giovanny Timmons PMD Dr. Janet Mcdaniels  Neuro/ALS Dr. Carrington Da Silva  Gastroenterology Dr. Giovanny Timmons

## 2020-10-18 NOTE — H&P ADULT - NSHPPHYSICALEXAM_GEN_ALL_CORE
T(C): 36.4 (10-18-20 @ 02:28), Max: 36.4 (10-18-20 @ 02:28)  HR: 113 (10-18-20 @ 02:28) (101 - 113)  BP: 129/60 (10-18-20 @ 02:28) (129/60 - 161/100)  RR: 24 (10-18-20 @ 02:28) (22 - 24)  SpO2: 94% (10-18-20 @ 02:28) (89% - 94%)    GENERAL: frail, anxious appearing, laying in bed on 4 L NC, visibly short of breath  EYES: sclera clear, no exudates  LUNGS: Decreased breath sounds, b/l rhonchi, no wheezing or crackles   HEART: soft S1/S2, tachycardic, regular rhythm, no murmurs noted, no lower extremity edema  GASTROINTESTINAL: abdomen is soft, nontender, nondistended, normoactive bowel sounds, no palpable masses, s/p PEG tube  INTEGUMENT: good skin turgor, no lesions noted  MUSCULOSKELETAL: no clubbing or cyanosis, no obvious deformity  NEUROLOGIC: Nonverbal at baselines, awake, alert, oriented x3, R facial droop baseline  PSYCHIATRIC: mood is good, affect is congruent, linear and logical thought process  HEME/LYMPH: no palpable supraclavicular nodules, no obvious ecchymosis or petechiae T(C): 36.4 (10-18-20 @ 02:28), Max: 36.4 (10-18-20 @ 02:28)  HR: 113 (10-18-20 @ 02:28) (101 - 113)  BP: 129/60 (10-18-20 @ 02:28) (129/60 - 161/100)  RR: 24 (10-18-20 @ 02:28) (22 - 24)  SpO2: 94% (10-18-20 @ 02:28) (89% - 94%)    GENERAL: frail, anxious appearing, laying in bed on 4 L NC, visibly short of breath  EYES: sclera clear, no exudates  LUNGS: Decreased breath sounds, b/l rhonchi; labored breathing using accessory muscles  HEART: soft S1/S2, tachycardic, regular rhythm, no murmurs noted, trace pitting lower extremity edema  GASTROINTESTINAL: abdomen is soft, nontender, nondistended, normoactive bowel sounds, no palpable masses, s/p PEG tube  INTEGUMENT: good skin turgor, no lesions noted  MUSCULOSKELETAL: no clubbing or cyanosis, no obvious deformity  NEUROLOGIC: Nonverbal at baselines, awake, alert, oriented x3, R facial droop baseline  PSYCHIATRIC: appears extremely anxious  HEME/LYMPH: no palpable supraclavicular nodules, no obvious ecchymosis or petechiae T(C): 36.4 (10-18-20 @ 02:28), Max: 36.4 (10-18-20 @ 02:28)  HR: 113 (10-18-20 @ 02:28) (101 - 113)  BP: 129/60 (10-18-20 @ 02:28) (129/60 - 161/100)  RR: 24 (10-18-20 @ 02:28) (22 - 24)  SpO2: 94% (10-18-20 @ 02:28) (89% - 94%)    GENERAL: frail, anxious appearing, laying in bed on 4 L NC, visibly short of breath  EYES: sclera clear, no exudates  LUNGS: Decreased breath sounds, b/l rhonchi; labored breathing using accessory muscles  HEART: soft S1/S2, tachycardic, regular rhythm, no murmurs noted, trace pitting lower extremity edema  GASTROINTESTINAL: abdomen is soft, nontender, nondistended, normoactive bowel sounds, no palpable masses, s/p PEG tube  INTEGUMENT: good skin turgor, no lesions noted; no sacral ulcer on exam  MUSCULOSKELETAL: no clubbing or cyanosis, no obvious deformity  NEUROLOGIC: Nonverbal at baselines, awake, alert, oriented x3, R facial droop baseline  PSYCHIATRIC: appears extremely anxious  HEME/LYMPH: no palpable supraclavicular nodules, no obvious ecchymosis or petechiae

## 2020-10-18 NOTE — DIETITIAN INITIAL EVALUATION ADULT. - PROBLEM SELECTOR PLAN 3
patient with hypothermia - unclear if infectious or autonomic dysfunction  Pt presented with 3/4 SIRS criteria, with unknown source   - does not appear to have active infection. follow up UA, blood/urine cultures  - Likely 2/2 to worsening ALS/autonomic dysfunction vs PE  - F/u D-Dimer, US Duplex LE, CT angio if able  - Continue to monitor hemodynamics  - Pt accepted to ICU for further management

## 2020-10-18 NOTE — DIETITIAN INITIAL EVALUATION ADULT. - PROBLEM SELECTOR PLAN 1
Pt presented with acute shortness of breath, O2 sat 88% on RA upon presentation to the ED (desaturated to low 80s on 4L O2 NC), now on NRB mask - increasing oxygen requirements and labored breathing at risk for airway compromise  - May be secondary to PE vs progression of ALS   - Ordered Ct angio to r/o PE - pt appears very anxious and shook her head when told that she was ordered for a CTA to rule out pulmonary embolism. Her son states that there are no contraindications to CTA, no allergies to contrast or hx of renal insufficiency.   - F/u D-dimer and US lower extremities. Consider CT Angio Chest.  - check cardiac enzymes, serum pro-BNP  - Continue O2 supplementation as required and wean off as tolerated  - Start Bi-Level PAP (has NIV with CPAP at home but cannot tolerate) - given low dose ativan to help facilitate this. May need precedex gtt. High risk for respiratory collapse requiring intubation.  - GOC have not been elucidated, her outpatient physician went over end of life care including possibility of tracheostomy vs palliative route. patient refuses to make a decision and has not shared her thoughts with her son/HCP Lambert. Patient is able to make her own decisions at this time. As such, she is a full code.  - Pulmonology (Dr. Choi) consulted, f/u recs  - Palliative care consulted (Dr. Pranav Edouard), f/u recs  - Pt accepted to ICU for further management

## 2020-10-18 NOTE — H&P ADULT - HISTORY OF PRESENT ILLNESS
70 y/o F with PMHx of ALS, HTN, s/p PEG tube on tube feedings presented to the ED with a chief complaint of dyspnea as per her son. She is strict NPO and just recently had PEG placed in [facility] with Dr. Timmons. Her son states that she was doing well until Thurs 10/15, she had decreased food intake although he states he has been hydrating her through the PEG tube. She has been constipated, with minimal bowel movements last had a small BM on 10/17. She has been told by her ALS specialist regarding goals of care and possible tracheostomy placement as opposed to a palliative route. She has yet to make up her mind and has not shared her wishes with her son/HCP Lambert. She is AAOx4 and able to make her own decisions. She appears very anxious and shook her head when told that she was ordered for a CTA to rule out pulmonary embolism. Her son states that there are no contraindications to CTA, no allergies to contrast or hx of renal insufficiency. Son states that she is on Isosource tube feeds 8-12oz 4 times a day, but he is not sure of the exact amount of tube feeds she receives. He just received the kangaroo pump to change her from bolus to continuous feeds. She denies any pain anywhere. Admits anxiety, constipation and some dyspnea.      In the ED,  Vital Signs T(F) Max: 97.5 HR: 113 BP: 161/100 RR: 24 SpO2: 88% on RA (desaturated to low 80s on 4L O2 NC)  Labs were unremarkable: Cl 95, Bicarb 35, Albumin 3.1, Alk Phos 150  EKG SR at 100 bpm no acute changes 68 y/o F with PMHx of ALS, HTN, s/p PEG tube on tube feedings presented to the ED with a chief complaint of dyspnea as per her son. She is strict NPO and just recently had PEG placed in [facility] with Dr. Timmons. Her son states that she was doing well until Thurs 10/15, she had decreased food intake although he states he has been hydrating her through the PEG tube. She has been constipated, with minimal bowel movements last had a small BM on 10/17. She has been told by her ALS specialist regarding goals of care and possible tracheostomy placement as opposed to a palliative route. She has yet to make up her mind and has not shared her wishes with her son/HCP Lambert. She is AAOx4 and able to make her own decisions. She appears very anxious and shook her head when told that she was ordered for a CTA to rule out pulmonary embolism. Her son states that there are no contraindications to CTA, no allergies to contrast or hx of renal insufficiency. Son states that she is on Isosource tube feeds approximately 8-12oz 4 times a day, but he is not sure of the exact amount of tube feeds she receives. He just received the kangaroo pump to change her from bolus to continuous feeds. She denies any pain anywhere. Admits anxiety, constipation and some dyspnea. Attempted to use AAC for iPAD without success.    In the ED,  Vital Signs T(F) Max: 97.5 HR: 113 BP: 161/100 RR: 24 SpO2: 88% on RA (desaturated to low 80s on 4L O2 NC)  Labs were unremarkable: Cl 95, Bicarb 35, Albumin 3.1, Alk Phos 150  CXR: no acute infiltrate, left reverse shoulder replacement noted, stool burden noted  EKG SR at 100 bpm no acute changes  Patient with increasing O2 requirements and labored breathing, thus ICU was consulted.

## 2020-10-18 NOTE — DIETITIAN INITIAL EVALUATION ADULT. - PROBLEM SELECTOR PLAN 5
Chronic, stable   On Amlodipine 5 mg qd at home  - Continue home amlodipine with hold parameters  - monitor hemodynamics

## 2020-10-18 NOTE — ED PROVIDER NOTE - OBJECTIVE STATEMENT
68yo female who presents with sob today. son states pt seems like she had some difficulty breathing with coughing, no fever, chills, no abd pain or vomiting, pt has als so hx is limited because pt is nonverbal

## 2020-10-18 NOTE — DIETITIAN INITIAL EVALUATION ADULT. - PROBLEM SELECTOR PLAN 2
Chronic, On Riluzole and glycopyrrolate at home  - Continue glycopyrrolate  - Son to bring Riluzole from home  - Chest PT, Incentive bouchra if tolerated, Turn and position, NIPPV  - poor overall prognosis - GOC w/ palliative team

## 2020-10-18 NOTE — CONSULT NOTE ADULT - PROBLEM SELECTOR RECOMMENDATION 2
feeds on hold  enema given with some small bowel movement thereafter  likely will need bowel regimen once on feeds/po

## 2020-10-18 NOTE — PROGRESS NOTE ADULT - SUBJECTIVE AND OBJECTIVE BOX
Patient is a 69y old  Female who presents with a chief complaint of acute hypoxic respiratory failure (18 Oct 2020 06:57)    24 hour events: ***    REVIEW OF SYSTEMS  Constitutional: No fever, chills, fatigue  Neuro: No headache, numbness, weakness  Resp: No cough, wheezing, shortness of breath  CVS: No chest pain, palpitations, leg swelling  GI: No abdominal pain, nausea, vomiting, diarrhea   : No dysuria, frequency, incontinence  Skin: No itching, burning, rashes, or lesions   Msk: No joint pain or swelling  Psych: No depression, anxiety, mood swings  Heme: No bleeding    T(F): 98.8 (10-18-20 @ 06:15), Max: 98.8 (10-18-20 @ 06:15)  HR: 110 (10-18-20 @ 06:39) (90 - 113)  BP: 148/83 (10-18-20 @ 06:15) (79/53 - 161/100)  RR: 23 (10-18-20 @ 06:15) (22 - 27)  SpO2: 93% (10-18-20 @ 06:39) (89% - 100%)  Wt(kg): --            I&O's Summary    10-17 @ 07:01  -  10-18 @ 07:00  --------------------------------------------------------  IN: 0 mL / OUT: 300 mL / NET: -300 mL      PHYSICAL EXAM  General:   CNS:   HEENT:   Resp:   CVS:   Abd:   Ext:   Skin:     MEDICATIONS    amLODIPine   Tablet Oral      guaiFENesin   Syrup  (Sugar-Free) Oral PRN    escitalopram Oral  riluzole Oral      enoxaparin Injectable SubCutaneous    glycopyrrolate Oral  glycopyrrolate Oral  lactulose Retention Enema Rectal  linaclotide Oral  pantoprazole  Injectable IV Push          chlorhexidine 4% Liquid Topical                            13.5   8.36  )-----------( 252      ( 18 Oct 2020 00:48 )             36.5       10-18    135  |  95<L>  |  20  ----------------------------<  158<H>  3.7   |  35<H>  |  0.45<L>    Ca    9.9      18 Oct 2020 00:48    TPro  9.8<H>  /  Alb  3.1<L>  /  TBili  0.8  /  DBili  x   /  AST  21  /  ALT  43  /  AlkPhos  150<H>  10-18    Lactate 1.2           10-18 @ 00:48      CARDIAC MARKERS ( 18 Oct 2020 00:48 )  .022 ng/mL / x     / 75 U/L / x     / 19.1 ng/mL      PT/INR - ( 18 Oct 2020 04:03 )   PT: 12.4 sec;   INR: 1.06 ratio         PTT - ( 18 Oct 2020 04:03 )  PTT:48.2 sec          Radiology: ***  Bedside lung ultrasound: ***  Bedside ECHO: ***    CENTRAL LINE: Y/N          DATE INSERTED:              REMOVE: Y/N  COHEN: Y/N                        DATE INSERTED:              REMOVE: Y/N  A-LINE: Y/N                       DATE INSERTED:              REMOVE: Y/N    GLOBAL ISSUE/BEST PRACTICE  Analgesia:   Sedation:   CAM-ICU:   HOB elevation: yes  Stress ulcer prophylaxis:   VTE prophylaxis:   Glycemic control:   Nutrition:     CODE STATUS: ***  [facility] discussion: Y       Patient is a 69y old  Female who presents with a chief complaint of acute hypoxic respiratory failure (18 Oct 2020 06:57)    24 hour events: Pt was hypothermic in ED, now resolved. Pt seen at bedside and breathing and saturating well on BIPAP, pt says it is helping. Pt endorses lower abdominal pain and leg pain.    REVIEW OF SYSTEMS: Limited due to weakness from progressing ALS. Pt says BIPAP is helping with breathing but uncomfortable. Complaining of lower abdominal pain and leg pain.    T(F): 98.8 (10-18-20 @ 06:15), Max: 98.8 (10-18-20 @ 06:15)  HR: 110 (10-18-20 @ 06:39) (90 - 113)  BP: 148/83 (10-18-20 @ 06:15) (79/53 - 161/100)  RR: 23 (10-18-20 @ 06:15) (22 - 27)  SpO2: 93% (10-18-20 @ 06:39) (89% - 100%)  Wt(kg): --            I&O's Summary    10-17 @ 07:01  -  10-18 @ 07:00  --------------------------------------------------------  IN: 0 mL / OUT: 300 mL / NET: -300 mL      PHYSICAL EXAM    General: Elderly, frail female, lying in bed on BIPAP  HEENT: NC/AT, Symmetric, on BIPAP  PULM: Coarse to auscultation bilaterally, no significant sputum production  CVS: tachycardic, regular rhythm, no murmurs, rubs, or gallops appreciated  ABD: Soft, mildly distended, +tender, normoactive bowel sounds, no masses appreciated, +PEG  EXT: Nonpitting edema, nontender  SKIN: Warm and well perfused, no rashes noted.  NEURO: Non-verbal at baseline due to progressing ALS, answers y/n questions with head nods and hand squeezing.     MEDICATIONS    amLODIPine   Tablet Oral      guaiFENesin   Syrup  (Sugar-Free) Oral PRN    escitalopram Oral  riluzole Oral      enoxaparin Injectable SubCutaneous    glycopyrrolate Oral  glycopyrrolate Oral  lactulose Retention Enema Rectal  linaclotide Oral  pantoprazole  Injectable IV Push          chlorhexidine 4% Liquid Topical                            13.5   8.36  )-----------( 252      ( 18 Oct 2020 00:48 )             36.5       10-18    135  |  95<L>  |  20  ----------------------------<  158<H>  3.7   |  35<H>  |  0.45<L>    Ca    9.9      18 Oct 2020 00:48    TPro  9.8<H>  /  Alb  3.1<L>  /  TBili  0.8  /  DBili  x   /  AST  21  /  ALT  43  /  AlkPhos  150<H>  10-18    Lactate 1.2           10-18 @ 00:48      CARDIAC MARKERS ( 18 Oct 2020 00:48 )  .022 ng/mL / x     / 75 U/L / x     / 19.1 ng/mL      PT/INR - ( 18 Oct 2020 04:03 )   PT: 12.4 sec;   INR: 1.06 ratio         PTT - ( 18 Oct 2020 04:03 )  PTT:48.2 sec      Blood Gas Profile - Arterial (10.18.20 @ 06:31)    pH, Arterial: 7.29    pCO2, Arterial: 70 mmHg    pO2, Arterial: 112 mmHg    HCO3, Arterial: 29 mmol/L    Base Excess, Arterial: 6.5 mmol/L    Oxygen Saturation, Arterial: 97 %    FIO2, Arterial: 40%    Blood Gas Comments Arterial: BiPap 12/5 @ 40% +MAT RRA Critical PCO2        Radiology:   < from: US Duplex Venous Lower Ext Complete, Bilateral (10.18.20 @ 04:49) >  IMPRESSION:  No evidence of deep venous thrombosis in either lower extremity.  < end of copied text >    < from: Xray Chest 1 View-PORTABLE IMMEDIATE (10.18.20 @ 00:43) >  IMPRESSION:  No acute disease.  No significant change as compared to 6/14/2019  < end of copied text >    < from: 12 Lead ECG (10.18.20 @ 00:10) >  Diagnosis Line Normal sinus rhythm  Possible Inferior infarct , age undetermined  < end of copied text >        CENTRAL LINE: N   COHEN: N  A-LINE: N      GLOBAL ISSUE/BEST PRACTICE  Analgesia: Y  Sedation: N  CAM-ICU: n/a  HOB elevation: yes  Stress ulcer prophylaxis: y  VTE prophylaxis: y  Glycemic control: n  Nutrition: npo    CODE STATUS: FULL CODE         Patient is a 69y old  Female who presents with a chief complaint of acute hypoxic respiratory failure (18 Oct 2020 06:57)    24 hour events: Pt was hypothermic to 93.2 in ED, now resolved. Pt seen at bedside and breathing and saturating well on BIPAP, pt says it is helps her breathing but finds it uncomfortable. Pt endorses lower abdominal pain and leg pain.    REVIEW OF SYSTEMS: Limited due to weakness from progressing ALS. Pt says BIPAP is helping with breathing but uncomfortable. Complaining of lower abdominal pain and leg pain.    T(F): 98.8 (10-18-20 @ 06:15), Max: 98.8 (10-18-20 @ 06:15)  HR: 110 (10-18-20 @ 06:39) (90 - 113)  BP: 148/83 (10-18-20 @ 06:15) (79/53 - 161/100)  RR: 23 (10-18-20 @ 06:15) (22 - 27)  SpO2: 93% (10-18-20 @ 06:39) (89% - 100%)  Wt(kg): --            I&O's Summary    10-17 @ 07:01  -  10-18 @ 07:00  --------------------------------------------------------  IN: 0 mL / OUT: 300 mL / NET: -300 mL      PHYSICAL EXAM    General: Elderly, frail female, lying in bed on BIPAP  HEENT: NC/AT, Symmetric, on BIPAP  PULM: Coarse to auscultation bilaterally, no significant sputum production  CVS: tachycardic, regular rhythm, no murmurs, rubs, or gallops appreciated  ABD: Soft, mildly distended, +tender, normoactive bowel sounds, no masses appreciated, +PEG, area with mild surrounding erythema  EXT: Nonpitting edema, nontender  SKIN: Warm and well perfused  NEURO: Non-verbal at baseline due to progressing ALS, answers y/n questions with head nods and hand squeezing.     MEDICATIONS    amLODIPine   Tablet Oral      guaiFENesin   Syrup  (Sugar-Free) Oral PRN    escitalopram Oral  riluzole Oral      enoxaparin Injectable SubCutaneous    glycopyrrolate Oral  glycopyrrolate Oral  lactulose Retention Enema Rectal  linaclotide Oral  pantoprazole  Injectable IV Push          chlorhexidine 4% Liquid Topical                            13.5   8.36  )-----------( 252      ( 18 Oct 2020 00:48 )             36.5       10-18    135  |  95<L>  |  20  ----------------------------<  158<H>  3.7   |  35<H>  |  0.45<L>    Ca    9.9      18 Oct 2020 00:48    TPro  9.8<H>  /  Alb  3.1<L>  /  TBili  0.8  /  DBili  x   /  AST  21  /  ALT  43  /  AlkPhos  150<H>  10-18    Lactate 1.2           10-18 @ 00:48      CARDIAC MARKERS ( 18 Oct 2020 00:48 )  .022 ng/mL / x     / 75 U/L / x     / 19.1 ng/mL      PT/INR - ( 18 Oct 2020 04:03 )   PT: 12.4 sec;   INR: 1.06 ratio         PTT - ( 18 Oct 2020 04:03 )  PTT:48.2 sec      Blood Gas Profile - Arterial (10.18.20 @ 06:31)    pH, Arterial: 7.29    pCO2, Arterial: 70 mmHg    pO2, Arterial: 112 mmHg    HCO3, Arterial: 29 mmol/L    Base Excess, Arterial: 6.5 mmol/L    Oxygen Saturation, Arterial: 97 %    FIO2, Arterial: 40%    Blood Gas Comments Arterial: BiPap 12/5 @ 40% +MAT RRA Critical PCO2        Radiology:   < from: US Duplex Venous Lower Ext Complete, Bilateral (10.18.20 @ 04:49) >  IMPRESSION:  No evidence of deep venous thrombosis in either lower extremity.  < end of copied text >    < from: Xray Chest 1 View-PORTABLE IMMEDIATE (10.18.20 @ 00:43) >  IMPRESSION:  No acute disease.  No significant change as compared to 6/14/2019  < end of copied text >    < from: 12 Lead ECG (10.18.20 @ 00:10) >  Diagnosis Line Normal sinus rhythm  Possible Inferior infarct , age undetermined  < end of copied text >        CENTRAL LINE: N   COHEN: N  A-LINE: N      GLOBAL ISSUE/BEST PRACTICE  Analgesia: Y  Sedation: N  HOB elevation: yes  Stress ulcer prophylaxis: y  VTE prophylaxis: y  Glycemic control: n  Nutrition: npo    CODE STATUS: FULL CODE

## 2020-10-18 NOTE — H&P ADULT - ASSESSMENT
70 y/o F with PMHx of ALS, HTN, s/p PEG tube on tube feedings presented to the ED with a chief complaint of dyspnea with hypoxia, constipation s/p PEG placement, and hypothermia with tachycardia likely 2/2 to worsening ALS with autonomic dysfunction vs bronchiti, will R/O PE in setting of hypoxia and tachycardia 70 y/o F with PMHx of ALS, HTN, s/p PEG tube on tube feedings presented to the ED with a chief complaint of dyspnea with hypoxia, constipation s/p PEG placement, and hypothermia with tachycardia likely 2/2 to worsening ALS with autonomic dysfunction, possible bronchitis vs inability to clear secretions. Admitted for acute hypoxic and hypercapnic respiratory failure likely in the setting of progressive ALS, will need to rule out pulmonary embolism

## 2020-10-18 NOTE — ED PROVIDER NOTE - CARE PLAN
Principal Discharge DX:	Hypoxemia requiring supplemental oxygen  Secondary Diagnosis:	ALS (amyotrophic lateral sclerosis)

## 2020-10-18 NOTE — CHART NOTE - TREATMENT: THE FOLLOWING DIET HAS BEEN RECOMMENDED
When medically feasible:  1.) Jevity 1.5, 1 can (237cc) bolused 5x/day via peg, additional H20 flush 150cc TID   2.) MVI with minerals daily (liquid) via peg

## 2020-10-18 NOTE — PROGRESS NOTE ADULT - ASSESSMENT
70 yo f pmhx End Stage ALS with recent PEG placement (second one), and HTN admitted with     1. Hypoxic/Hypercapnic Respiratory Failure  2. Advanced ALS       NEURO: Advanced ALS, home medications ordered, supportive care.  Message left with patient's neurologist Dr. Membreno's answering service.   CV: BP borderline post ativan administration for anxiety. Close monitoring, crystalloid boluses as needed.    RESP: Hypoxic respiratory failure in setting of advancing ALS, abg revealing hypercapnia, patient placed on bipap, actively titrating fio2 for spo2 >88%, weaning as tolerated.  Keep HOB >45, aspiration precuations.  Patient at extremely high risk for respiratory decompensation requiring intubation.    RENAL: Monitor lytes, replace as needed  GI: NPO.  No medications per PEG rn due to initiation of bipap. Severe constipation, lactulose retention enema ordered.    ENDO: POCT q6hrs while NPO   ID: No active infectious process.   HEME: Lovenox for VTE ppx   DISPO: Full code.  GOC discussion started by outpatient Neurologist Dr. Membreno.  Patient has been unable to make a decision and when patient's son (HCP) Lambert Oglesby was asked he endorsed he cannot make this decision for her.  \    Case discussed with eICU attending Dr. Reddy.    Critical Care time: 60 mins assessing presenting problems of acute illness that poses high probability of life threatening deterioration or end organ damage/dysfunction.  Medical decision making including Initiating plan of care, reviewing data, reviewing radiology, discussing with multidisciplinary team, non inclusive of procedures, discussing goals of care with patient/family 70 yo f pmhx End Stage ALS with recent PEG placement (second one), and HTN admitted with hypoxic/hypercapnic respiratory failure likely 2/2 advanced ALS.      NEURO: Advanced ALS, home medications ordered with some brought in by son, supportive care.  Message left overnight with patient's neurologist Dr. Membreno's answering service. Tylenol PRN for mild pain.  CV: HD stable currently. C/w home amlodipine with hold parameters.  RESP: Hypoxic respiratory failure in setting of advancing ALS, abg revealing hypercapnia, patient placed on bipap, actively titrating fio2 for spo2 >88%, weaning as tolerated.  Keep HOB >45, aspiration precautions.  Patient at extremely high risk for respiratory decompensation requiring intubation.  RENAL: Renal indices stable.   GI: NPO. Continue to hold feeds as patient is high risk for aspiration. Can continue meds through PEG tube. Severe constipation, lactulose retention enema given, ordered miralax and senna.    ENDO: POCT q6hrs while NPO   ID: Pt hypothermic upon admission but has since resolved. No leukocytosis. CXR negative for active disease. Respiratory failure seems more likely due to advanced ALS than active infection. F/u blood, urine, and sputum cultures.  HEME: Lovenox for VTE ppx   DISPO: Full code.  GOC discussion started by outpatient Neurologist Dr. Membreno however no decision has been made yet. Will discuss GOC with patient's son (HCP) Lambert Oglesby and patient. Palliative care consulted.

## 2020-10-18 NOTE — H&P ADULT - NSHPREVIEWOFSYSTEMS_GEN_ALL_CORE
ROS limited due to pt not able to verbally communicate 2/2 to progressive ALS  Pt admitted SOB ROS limited due to pt not able to verbally communicate 2/2 to progressive ALS. Able to nod and shake her head.  Pt admitted dyspnea, anxiety and constipation.  Pt denies pain anywhere.

## 2020-10-18 NOTE — H&P ADULT - NSICDXPASTSURGICALHX_GEN_ALL_CORE_FT
PAST SURGICAL HISTORY:  S/P brain surgery neuroma    S/P percutaneous endoscopic gastrostomy (PEG) tube placement      PAST SURGICAL HISTORY:  S/P brain surgery for neuroma    S/P percutaneous endoscopic gastrostomy (PEG) tube placement     S/P shoulder replacement, left

## 2020-10-18 NOTE — ED ADULT NURSE NOTE - PLAN OF CARE
Call bell/Fall precautions/Side rails/Explanation of exam/test/NPO/Bedside visitors/Position of comfort

## 2020-10-18 NOTE — H&P ADULT - PROBLEM SELECTOR PLAN 4
Chronic, On Riluzole and glycopyrrolate at home  - Continue glycopyrrolate  - Son to bring Riluzole from home Per son pt has been constipation since PEG placement (w/ Dr. ROGER Timmons) with minimal BMs, last on 10/17. CXR with evidence of significant constipation.  - GI (Dr. Blake) consulted, f/u recs  - Pts son to bring linzess from home Per son pt has been constipated since PEG placement (w/ Dr. ROGER Timmons) with minimal BMs, last on 10/17. CXR with evidence of significant constipation.  - GI (Dr. Blake) consulted, f/u recs  - Pts son to bring linzess from home

## 2020-10-18 NOTE — CONSULT NOTE ADULT - SUBJECTIVE AND OBJECTIVE BOX
Date/Time Patient Seen:  		  Referring MD:   Data Reviewed	       Patient is a 69y old  Female who presents with a chief complaint of acute hypoxic respiratory failure (18 Oct 2020 03:49)      Subjective/HPI  in bed  seen and examined  vs and meds reviewed  on BIPAP  poor historian  h and P reviewed  er provider note reviewed  US doppler and CXR noted  transferred to ICU for resp failure  non smoker  non drinker  has family  family hx - obesity htn  ros - resp distress  · Date and Time Provider was Notified:	18-Oct-2020 04:11  · Background	69yoF h/o advanced ALS and second PEG placement presented with shortness of breath  · Assessment	Pt was placed on NRB and then BiPAP. ABG showed hypoxia/hypercapnia. afebrile. no leukocytosis. Pt refused CTA to rule out PE.  · Recommendations	Per report pt recently discussed GOC with neurologist but did not reach a decision. Given her advanced ALS, she is at high risk for further respiratory decompensation. Since she is still full code, she should be intubated if failing noninvasive ventilation.     Discussed with BONNIE Hsu.    · Subjective and Objective:   70 yo f pmhx End Stage ALS with recent PEG placement (second one), and HTN biba from home with worsening SOB.  Patient is a difficult historian 2/2 communication limitations but nods yes to sob that started a few days ago and has since progressed.  Upon presentation to ED patient hypoxic to 88% on RA, placed on NC with quickly escalating requirements.  Patient's wob progressed, patient extremely anxious, requiring increasing oxygen requirements, ultimately placed on NRB prompting ICU consult.  Patient seen at bedside, spo2 100% on NRB, ABG obtained 7.3/71/267/30/7.5.  Patient placed on BiPAP and admitted to ICU.    History and Physical:   Source of Information	Chart(s), Patient, Child  Comments/Contacts	son/HCP Lambert Dale Jr. (home: [phone])  Outpatient Providers	PMD Dr. Janet Mcdaniels  Neuro/ALS Dr. Carrington Da Silva  Gastroenterology Dr. Giovanny Timmons     Patient Identity:  · Birth Sex	Female       History of Present Illness:  Reason for Admission: acute hypoxic respiratory failure  History of Present Illness:   68 y/o F with PMHx of ALS, HTN, s/p PEG tube on tube feedings presented to the ED with a chief complaint of dyspnea as per her son. She is strict NPO and just recently had PEG placed in [facility] with Dr. Timmons. Her son states that she was doing well until Thurs 10/15, she had decreased food intake although he states he has been hydrating her through the PEG tube. She has been constipated, with minimal bowel movements last had a small BM on 10/17. She has been told by her ALS specialist regarding goals of care and possible tracheostomy placement as opposed to a palliative route. She has yet to make up her mind and has not shared her wishes with her son/HCP Lambert. She is AAOx4 and able to make her own decisions. She appears very anxious and shook her head when told that she was ordered for a CTA to rule out pulmonary embolism. Her son states that there are no contraindications to CTA, no allergies to contrast or hx of renal insufficiency. Son states that she is on Isosource tube feeds approximately 8-12oz 4 times a day, but he is not sure of the exact amount of tube feeds she receives. He just received the kangaroo pump to change her from bolus to continuous feeds. She denies any pain anywhere. Admits anxiety, constipation and some dyspnea. Attempted to use AAC for iPAD without success.      PAST SURGICAL HISTORY:  S/P brain surgery for neuroma    S/P percutaneous endoscopic gastrostomy (PEG) tube placement     S/P shoulder replacement, left.     FAMILY HISTORY:  FHx: breast cancer, in mother  FHx: leukemia.     Social History:  Social History (marital status, living situation, occupation, tobacco use, alcohol and drug use, and sexual history): Lives with son, does not ambulate 2/2 to ALS  Denies tobacco, alcohol, drugs  NPO, PEG tube for feedings. Uses AAC to communicate via iPad.     Tobacco Screening:  · Core Measure Site	Yes  · Has the patient used tobacco in the past 30 days?	No    Risk Assessment:    Present on Admission:  Deep Venous Thrombosis	no  Pulmonary Embolus	suspected  Pressure Ulcer(s)	no      Heart Failure:  Does this patient have a history of or has been diagnosed with heart failure? unknown.     PAST MEDICAL & SURGICAL HISTORY:  HTN (hypertension)    ALS (amyotrophic lateral sclerosis)    S/P shoulder replacement, left    S/P percutaneous endoscopic gastrostomy (PEG) tube placement    S/P brain surgery  for neuroma    No significant past surgical history          Medication list         MEDICATIONS  (STANDING):  amLODIPine   Tablet 5 milliGRAM(s) Oral daily  chlorhexidine 4% Liquid 1 Application(s) Topical <User Schedule>  enoxaparin Injectable 40 milliGRAM(s) SubCutaneous daily  escitalopram 20 milliGRAM(s) Oral daily  glycopyrrolate 1 milliGRAM(s) Oral at bedtime  glycopyrrolate 2 milliGRAM(s) Oral daily  lactulose Retention Enema 200 Gram(s) Rectal once  linaclotide 290 MICROGram(s) Oral <User Schedule>  pantoprazole  Injectable 40 milliGRAM(s) IV Push daily  riluzole 50 milliGRAM(s) Oral two times a day    MEDICATIONS  (PRN):  guaiFENesin   Syrup  (Sugar-Free) 200 milliGRAM(s) Oral every 6 hours PRN Cough         Vitals log        ICU Vital Signs Last 24 Hrs  T(C): 37.1 (18 Oct 2020 06:15), Max: 37.1 (18 Oct 2020 06:15)  T(F): 98.8 (18 Oct 2020 06:15), Max: 98.8 (18 Oct 2020 06:15)  HR: 110 (18 Oct 2020 06:39) (90 - 113)  BP: 148/83 (18 Oct 2020 06:15) (79/53 - 161/100)  BP(mean): 109 (18 Oct 2020 06:15) (109 - 109)  ABP: --  ABP(mean): --  RR: 23 (18 Oct 2020 06:15) (22 - 27)  SpO2: 93% (18 Oct 2020 06:39) (89% - 100%)           Input and Output:  I&O's Detail    17 Oct 2020 07:01  -  18 Oct 2020 06:58  --------------------------------------------------------  IN:  Total IN: 0 mL    OUT:    Voided (mL): 300 mL  Total OUT: 300 mL    Total NET: -300 mL          Lab Data                        13.5   8.36  )-----------( 252      ( 18 Oct 2020 00:48 )             36.5     10-18    135  |  95<L>  |  20  ----------------------------<  158<H>  3.7   |  35<H>  |  0.45<L>    Ca    9.9      18 Oct 2020 00:48    TPro  9.8<H>  /  Alb  3.1<L>  /  TBili  0.8  /  DBili  x   /  AST  21  /  ALT  43  /  AlkPhos  150<H>  10-18    ABG - ( 18 Oct 2020 06:31 )  pH, Arterial: 7.29  pH, Blood: x     /  pCO2: 70    /  pO2: 112   / HCO3: 29    / Base Excess: 6.5   /  SaO2: 97                CARDIAC MARKERS ( 18 Oct 2020 00:48 )  .022 ng/mL / x     / 75 U/L / x     / 19.1 ng/mL        Review of Systems	  sob  cohen    Objective     Physical Examination    weak  frail  awake  lung dec BS  abd soft  head nc  heart s1s2      Pertinent Lab findings & Imaging      Miles:  NO   Adequate UO     I&O's Detail    17 Oct 2020 07:01  -  18 Oct 2020 06:58  --------------------------------------------------------  IN:  Total IN: 0 mL    OUT:    Voided (mL): 300 mL  Total OUT: 300 mL    Total NET: -300 mL               Discussed with:     Cultures:	        Radiology        EXAM:  US DPLX LWR EXT VEINS COMPL BI                            PROCEDURE DATE:  10/18/2020          INTERPRETATION:  CLINICAL INFORMATION: Hypoxemia    COMPARISON: None available.    TECHNIQUE: Duplex sonography of the BILATERAL LOWER extremity veins with color and spectral Doppler, with and without compression.    FINDINGS:    There is normal compressibility of the bilateral common femoral, femoral and popliteal veins.  Doppler examination shows normal spontaneous and phasic flow.    No calf vein thrombosis is detected.    IMPRESSION:  No evidence of deep venous thrombosis in either lower extremity.        EXAM:  XR CHEST PORTABLE IMMED 1V                            PROCEDURE DATE:  10/18/2020          INTERPRETATION:  HISTORY: Shortness of breath, fever, cough    TECHNIQUE: Single frontal view of the chest with comparison to 6/14/2019    FINDINGS:    The heart is normal in size. Lungs are grossly clear. The apices and hemidiaphragms are unremarkable. Degenerative changes. Prior left humeral head replacement.        IMPRESSION:    No acute disease.    No significant change as compared to 6/14/2019

## 2020-10-18 NOTE — GOALS OF CARE CONVERSATION - ADVANCED CARE PLANNING - CONVERSATION DETAILS
Discussed intubation, trach, mechanical ventilation,  resuscitation with pt while son Lambert Shaw was present.  She has had previous discussions regarding this with her neurologist but had not yet made decision.  She does not want to have tracheostomy or intubation/mechanical ventilation, or cardiac resuscitation.  I discussed her decision with her  Lambert and he is also in agreement.  They also emphasized they want her to be comfortable and do not want her to suffer at the end of life.      We will continue to use BiPAP on/off as needed.  Family will also need additional support/services at home (cough assist).  Palliative care eval. Discussed intubation, trach, mechanical ventilation,  resuscitation with pt while son Lambert Shaw was present.  She has had previous discussions regarding this with her neurologist but had not yet made decision.  She does not want to have tracheostomy or intubation/mechanical ventilation, or cardiac resuscitation.  I discussed her decision with her  Lambert and he is also in agreement.  They also emphasized they want her to be comfortable and do not want her to suffer at the end of life.      We will continue to use BiPAP on/off as needed.  Family will also need additional support/services at home (cough assist, external female catheter).  Palliative care eval.

## 2020-10-18 NOTE — CONSULT NOTE ADULT - PROBLEM SELECTOR RECOMMENDATION 9
ALS  resp failure  exac of ALS  may need cough assist device  chest PT  suction PRN  on NIPPV now - full code - at risk for Intubation - GOC discussion is certainly warranted  LE dopplers NEG  CXR neg  HOB elev  asp prec  pt has a PEG - Nutrition  caution with sedatives that affect resp drive - eg. Opioids and Benzo, utilize Precedex and Ketamine PRN -   prognosis poor - appears to have end stage ALS - appropriate for DNR DNI

## 2020-10-18 NOTE — ED ADULT NURSE NOTE - TEMPLATE
----- Message from IRENE Burnett sent at 2/1/2018  1:46 PM EST -----  Lab work  
I spoke with patient - he is planning on getting labs drawn today/nayla  
Pt is to get lab work this week. Can we please call to remind him.   
Respiratory

## 2020-10-18 NOTE — ED ADULT NURSE NOTE - NSFALLRSKOUTCOME_ED_ALL_ED
Call to mom. Aware of below recommendations and she verbalizes understanding. Appointment for this morning canceled.   Fall with Harm Risk

## 2020-10-18 NOTE — CONSULT NOTE ADULT - ASSESSMENT
68 yo f pmhx End Stage ALS with recent PEG placement (second one), and HTN admitted with     1. Hypoxic/Hypercapnic Respiratory Failure  2. Advanced ALS       NEURO: Advanced ALS, home medications ordered, supportive care.    CV: BP borderline post ativan administration for anxiety. Close monitoring, crystalloid boluses as needed.    RESP: Hypoxic respiratory failure in setting of advancing ALS, abg revealing hypercapnia, patient placed on bipap, actively titrating fio2 for spo2 >88%, weaning as tolerated.  Keep HOB >45, aspiration precuations.  Patient at extremely high risk for respiratory decompensation requiring intubation.    RENAL: Monitor lytes, replace as needed  GI: NPO.  No medications per PEG rn due to initiation of bipap. Severe constipation, lactulose retention enema ordered.    ENDO: POCT q6hrs while NPO   ID: No active infectious process.   HEME: Lovenox for VTE ppx   DISPO: Full code.  GOC discussion started by outpatient Neurologist Dr. Membreno.  Patient has been unable to make a decision and when patient's son (HCP) Lambert Oglesby was asked he endorsed he cannot make this decision for her.  \    Case discussed with eICU attending Dr. Barron.    Critical Care time: 60 mins assessing presenting problems of acute illness that poses high probability of life threatening deterioration or end organ damage/dysfunction.  Medical decision making including Initiating plan of care, reviewing data, reviewing radiology, discussing with multidisciplinary team, non inclusive of procedures, discussing goals of care with patient/family 68 yo f pmhx End Stage ALS with recent PEG placement (second one), and HTN admitted with     1. Hypoxic/Hypercapnic Respiratory Failure  2. Advanced ALS       NEURO: Advanced ALS, home medications ordered, supportive care.  Message left with patient's neurologist Dr. Membreno's answering service.   CV: BP borderline post ativan administration for anxiety. Close monitoring, crystalloid boluses as needed.    RESP: Hypoxic respiratory failure in setting of advancing ALS, abg revealing hypercapnia, patient placed on bipap, actively titrating fio2 for spo2 >88%, weaning as tolerated.  Keep HOB >45, aspiration precuations.  Patient at extremely high risk for respiratory decompensation requiring intubation.    RENAL: Monitor lytes, replace as needed  GI: NPO.  No medications per PEG rn due to initiation of bipap. Severe constipation, lactulose retention enema ordered.    ENDO: POCT q6hrs while NPO   ID: No active infectious process.   HEME: Lovenox for VTE ppx   DISPO: Full code.  GOC discussion started by outpatient Neurologist Dr. Membreno.  Patient has been unable to make a decision and when patient's son (HCP) Lambert Oglesby was asked he endorsed he cannot make this decision for her.  \    Case discussed with eICU attending Dr. Barron.    Critical Care time: 60 mins assessing presenting problems of acute illness that poses high probability of life threatening deterioration or end organ damage/dysfunction.  Medical decision making including Initiating plan of care, reviewing data, reviewing radiology, discussing with multidisciplinary team, non inclusive of procedures, discussing goals of care with patient/family 68 yo f pmhx End Stage ALS with recent PEG placement (second one), and HTN admitted with     1. Hypoxic/Hypercapnic Respiratory Failure  2. Advanced ALS       NEURO: Advanced ALS, home medications ordered, supportive care.  Message left with patient's neurologist Dr. Membreno's answering service.   CV: BP borderline post ativan administration for anxiety. Close monitoring, crystalloid boluses as needed.    RESP: Hypoxic respiratory failure in setting of advancing ALS, abg revealing hypercapnia, patient placed on bipap, actively titrating fio2 for spo2 >88%, weaning as tolerated.  Keep HOB >45, aspiration precuations.  Patient at extremely high risk for respiratory decompensation requiring intubation.    RENAL: Monitor lytes, replace as needed  GI: NPO.  No medications per PEG rn due to initiation of bipap. Severe constipation, lactulose retention enema ordered.    ENDO: POCT q6hrs while NPO   ID: No active infectious process.   HEME: Lovenox for VTE ppx   DISPO: Full code.  GOC discussion started by outpatient Neurologist Dr. Membreno.  Patient has been unable to make a decision and when patient's son (HCP) Lambert Oglesby was asked he endorsed he cannot make this decision for her.  \    Case discussed with eICU attending Dr. Reddy.    Critical Care time: 60 mins assessing presenting problems of acute illness that poses high probability of life threatening deterioration or end organ damage/dysfunction.  Medical decision making including Initiating plan of care, reviewing data, reviewing radiology, discussing with multidisciplinary team, non inclusive of procedures, discussing goals of care with patient/family

## 2020-10-18 NOTE — CONSULT NOTE ADULT - SUBJECTIVE AND OBJECTIVE BOX
68 yo f pmhx End Stage ALS with recent PEG placement (second one), and HTN biba from home with worsening SOB.  Patient is a difficult historian 2/2 communication limitations but nods yes to sob that started a few days ago and has since progressed.  Upon presentation to ED patient hypoxic to 88% on RA, placed on NC with quickly escalating requirements.  Patient's wob progressed, patient extremely anxious, requiring increasing oxygen requirements, ultimately placed on NRB prompting ICU consult.  Patient seen at bedside, spo2 100% on NRB, ABG obtained 7.3/71/267/30/7.5.  Patient placed on BiPAP and admitted to ICU.        PAST MEDICAL & SURGICAL HISTORY:  HTN (hypertension)  ALS (amyotrophic lateral sclerosis)  S/P percutaneous endoscopic gastrostomy (PEG) tube placement  S/P brain surgery  neuroma      Allergies  Macrobid (Unknown)  Zithromax (Unknown)    Intolerances  sulfa drugs (Other)    FAMILY HISTORY:  FHx: leukemia    FHx: breast cancer  in mother      Social History:   From home, son takes care of her.       Review of Systems:  +sob      Vitals During Exam:   HR: 110  BP: 98/61 mmHg   RR: 30  sPO2: 100% on NRB    Physical Examination:    General: Elderly, frail female, lying in bed, moderate respiratory distress     HEENT: NC/AT, left facial droop (chronic), Pupils equal, reactive to light.  Symmetric. NRB in place    PULM: Shallow, rapid, symmetrical thorax expansion upon respiration. Coarse to auscultation bilaterally, no significant sputum production    CVS: Sinus tach, no murmurs, rubs, or gallops appreciated    ABD: Soft, mildly distended, +tender, normoactive bowel sounds, no masses appreciated    EXT: Nonpitting edema, nontender    SKIN: Warm and well perfused, no rashes noted.    NEURO: Lethargic, answers y/n questions with head nods.       Medications:  amLODIPine   Tablet 5 milliGRAM(s) Oral daily  guaiFENesin   Syrup  (Sugar-Free) 200 milliGRAM(s) Oral every 6 hours PRN  escitalopram 20 milliGRAM(s) Oral daily  riluzole 50 milliGRAM(s) Oral two times a day  enoxaparin Injectable 40 milliGRAM(s) SubCutaneous at bedtime  glycopyrrolate 1 milliGRAM(s) Oral at bedtime  glycopyrrolate 2 milliGRAM(s) Oral daily  lactulose Retention Enema 200 Gram(s) Rectal once  linaclotide 290 MICROGram(s) Oral <User Schedule>  chlorhexidine 4% Liquid 1 Application(s) Topical <User Schedule>      ICU Vital Signs Last 24 Hrs  T(C): 36.4 (18 Oct 2020 02:28), Max: 36.4 (18 Oct 2020 02:28)  T(F): 97.5 (18 Oct 2020 02:28), Max: 97.5 (18 Oct 2020 02:28)  HR: 97 (18 Oct 2020 03:48) (97 - 113)  BP: 96/48 (18 Oct 2020 03:48) (96/48 - 161/100)  BP(mean): --  ABP: --  ABP(mean): --  RR: 26 (18 Oct 2020 03:48) (22 - 27)  SpO2: 100% (18 Oct 2020 03:48) (89% - 100%)    Vital Signs Last 24 Hrs  T(C): 36.4 (18 Oct 2020 02:28), Max: 36.4 (18 Oct 2020 02:28)  T(F): 97.5 (18 Oct 2020 02:28), Max: 97.5 (18 Oct 2020 02:28)  HR: 97 (18 Oct 2020 03:48) (97 - 113)  BP: 96/48 (18 Oct 2020 03:48) (96/48 - 161/100)  BP(mean): --  RR: 26 (18 Oct 2020 03:48) (22 - 27)  SpO2: 100% (18 Oct 2020 03:48) (89% - 100%)    ABG - ( 18 Oct 2020 03:40 )  pH, Arterial: 7.30  pH, Blood: x     /  pCO2: 71    /  pO2: 267   / HCO3: 30    / Base Excess: 7.5   /  SaO2: 99          LABS:                        13.5   8.36  )-----------( 252      ( 18 Oct 2020 00:48 )             36.5     10-18    135  |  95<L>  |  20  ----------------------------<  158<H>  3.7   |  35<H>  |  0.45<L>    Ca    9.9      18 Oct 2020 00:48    TPro  9.8<H>  /  Alb  3.1<L>  /  TBili  0.8  /  DBili  x   /  AST  21  /  ALT  43  /  AlkPhos  150<H>  10-18      CULTURES:  Pending      RADIOLOGY:   < from: CT Chest No Cont (06.17.19 @ 11:42) >  EXAM:  CT CHEST                          PROCEDURE DATE:  06/17/2019      INTERPRETATION:  CLINICAL INFORMATION: Evaluate pneumonia, atelectasis    COMPARISON: None.    PROCEDURE:   CT of the Chest was performed without intravenous contrast.  Sagittal and coronal reformats were performed.    FINDINGS:    LUNGS AND AIRWAYS: Patent central airways.  Left lower lobe infiltrate   with tree-in-bud parenchymal pattern suggesting an infectious   bronchiolitis. Mucoid impacted left lower lobe bronchi. No bony lobar   consolidation..    PLEURA: No pleural effusion.    MEDIASTINUM AND TOMASA: Scattered subcentimeter mediastinal lymph nodes   likely reactive. Lack of IV contrast limits hilar evaluation. Coarse left   thyroid lobe calcification.    VESSELS: Nonaneurysmal.    HEART: Heart size is normal. No pericardial effusion.    CHEST WALL AND LOWER NECK: Slightly prominent bilateral nonspecific   axillary lymph nodes. Nonspecific right breast calcification..    VISUALIZED UPPER ABDOMEN: Cholecystectomy. Gastrostomy tube in position.   Hepatic cysts..    BONES: Left shoulder arthroplasty. No acute or aggressive pathology.    IMPRESSION:     Left lower lobe infiltrate consistent with an infectious bronchiolitis.     Additional findings as discussed    PEÑA MARTINES M.D., ATTENDING RADIOLOGIST  This document has been electronically signed. Jun 17 2019  1:12PM    < end of copied text >      SUPPLEMENTAL O2: NRB, BiPAP  LINES: Peripheral   IVF: N  COHEN: N  PPx: PPI  CONTACT:

## 2020-10-18 NOTE — DIETITIAN INITIAL EVALUATION ADULT. - OTHER INFO
Pt awake/nonverbal at visit. Information obtained through EMR and pts son Lambert (HCP). Dx acute hypoxic respiratory failure. Hx advanced ALS. NPO on Bipap. Severe constipation reported. S/p lactulose enema. Per RN actively having BMs. Per telephone conversation with pts son Lambert pt on peg feedings of Isosource 1.5, 250cc 4-5x/day (though should be getting 5-6 cans per day per son). Admission weight 117lbs. Per pt son has lost significant weight (~60+lbs) over past 3 years however has recently gained ~10lb. Lowest weight 105lbs. Per son for past 4 days has had minimal TF (~2 cans per day). Seen by previous dietitian (6/17/19) during past admission in which pt identified with severe malnutrition. Weight at that time 125lbs.

## 2020-10-18 NOTE — DIETITIAN INITIAL EVALUATION ADULT. - FACTORS AFF FOOD INTAKE
other (specify)/difficulty swallowing/hx advanced ALS, dx acute respiratory failure on Bipap, NPO staus, peg

## 2020-10-18 NOTE — CONSULT NOTE ADULT - SUBJECTIVE AND OBJECTIVE BOX
[facility]  Giovanny Wahl PA-C  Formerly [facility], [facility] [place] [address]  [phone]      Chief Complaint:  Patient is a 69y old  Female who presents with a chief complaint of acute hypoxic respiratory failure (18 Oct 2020 09:41)      HPI: 69F with history as listed who presents with respiratory failure, admitted to icu for bipap  she had a peg placed last admission   asked to eval for constipation     Allergies:  Macrobid (Unknown)  sulfa drugs (Other)  Zithromax (Unknown)      Medications:  amLODIPine   Tablet 5 milliGRAM(s) Oral daily  chlorhexidine 4% Liquid 1 Application(s) Topical <User Schedule>  enoxaparin Injectable 40 milliGRAM(s) SubCutaneous daily  escitalopram 20 milliGRAM(s) Oral daily  glycopyrrolate 1 milliGRAM(s) Oral at bedtime  glycopyrrolate 2 milliGRAM(s) Oral daily  guaiFENesin   Syrup  (Sugar-Free) 200 milliGRAM(s) Oral every 6 hours PRN  linaclotide 290 MICROGram(s) Oral <User Schedule>  pantoprazole  Injectable 40 milliGRAM(s) IV Push daily  potassium chloride  10 mEq/100 mL IVPB 10 milliEquivalent(s) IV Intermittent every 1 hour  riluzole 50 milliGRAM(s) Oral two times a day      PMHX/PSHX:  HTN (hypertension)    ALS (amyotrophic lateral sclerosis)    S/P shoulder replacement, left    S/P percutaneous endoscopic gastrostomy (PEG) tube placement    S/P brain surgery    No significant past surgical history        Family history:  FHx: leukemia    FHx: breast cancer    No pertinent family history in first degree relatives        Social History:     ROS:     unable to obtain        PHYSICAL EXAM:   Vital Signs:  Vital Signs Last 24 Hrs  T(C): 36.2 (18 Oct 2020 07:10), Max: 37.1 (18 Oct 2020 06:15)  T(F): 97.1 (18 Oct 2020 07:10), Max: 98.8 (18 Oct 2020 06:15)  HR: 103 (18 Oct 2020 09:00) (90 - 113)  BP: 123/72 (18 Oct 2020 09:00) (79/53 - 161/100)  BP(mean): 92 (18 Oct 2020 09:00) (92 - 115)  RR: 24 (18 Oct 2020 09:00) (22 - 44)  SpO2: 99% (18 Oct 2020 09:00) (89% - 100%)  Daily Height in cm: 162.56 (18 Oct 2020 06:15)    Daily     nad  non verbal  frail  non toxic  soft, nt +peg c/d/i  no edema      LABS:                        12.2   7.47  )-----------( 210      ( 18 Oct 2020 08:01 )             33.3     10-18    141  |  102  |  17  ----------------------------<  94  3.6   |  32<H>  |  0.34<L>    Ca    9.1      18 Oct 2020 08:01  Phos  3.6     10-18  Mg     1.9     10-18    TPro  8.7<H>  /  Alb  2.7<L>  /  TBili  0.6  /  DBili  x   /  AST  18  /  ALT  36  /  AlkPhos  130<H>  10-18    LIVER FUNCTIONS - ( 18 Oct 2020 08:01 )  Alb: 2.7 g/dL / Pro: 8.7 g/dL / ALK PHOS: 130 U/L / ALT: 36 U/L / AST: 18 U/L / GGT: x           PT/INR - ( 18 Oct 2020 08:01 )   PT: 12.5 sec;   INR: 1.07 ratio         PTT - ( 18 Oct 2020 08:01 )  PTT:42.9 sec  Urinalysis Basic - ( 18 Oct 2020 09:19 )    Color: Pale Yellow / Appearance: Slightly Turbid / S.010 / pH: x  Gluc: x / Ketone: Negative  / Bili: Negative / Urobili: Negative   Blood: x / Protein: Negative / Nitrite: Negative   Leuk Esterase: Trace / RBC: x / WBC 3-5   Sq Epi: x / Non Sq Epi: Few / Bacteria: Occasional          Imaging:

## 2020-10-18 NOTE — H&P ADULT - PROBLEM SELECTOR PLAN 5
Chronic, stable   On Amlodipine 5 mg qd at home  - Continue home amlodipine with hold parameters Chronic, stable   On Amlodipine 5 mg qd at home  - Continue home amlodipine with hold parameters  - monitor hemodynamics

## 2020-10-18 NOTE — PROVIDER CONTACT NOTE (EICU) - RECOMMENDATIONS
Per report pt recently discussed GOC with neurologist but did not reach a decision. Given her advanced ALS, she is at high risk for further respiratory decompensation. Since she is still full code, she should be intubated if failing noninvasive ventilation.     Discussed with BONNIE Hsu.

## 2020-10-18 NOTE — H&P ADULT - PROBLEM SELECTOR PLAN 1
Pt presented with acute shortness of breath, O2 sat 88% on RA upon presentation to the ED (desaturated to low 80s on 4L O2 NC), now on NRB mask,  - May be secondary to PE vs worsening ALS   - Ordered Ct angio to r/o PE - pt appears very anxious and shook her head when told that she was ordered for a CTA to rule out pulmonary embolism. Her son states that there are no contraindications to CTA, no allergies to contrast or hx of renal insufficiency.   - F/u D-dimer and CTA  - Continue O2 supplementation as required and wean off as tolerated  - Pulmonology (Dr. Choi) consulted, f/u recs  - Pal care consulted (Dr. Pranav Edouard), f/u recs  - Pt accepted to ICU for further management Pt presented with acute shortness of breath, O2 sat 88% on RA upon presentation to the ED (desaturated to low 80s on 4L O2 NC), now on NRB mask - increasing oxygen requirements and labored breathing at risk for airway compromise  - May be secondary to PE vs progression of ALS   - Ordered Ct angio to r/o PE - pt appears very anxious and shook her head when told that she was ordered for a CTA to rule out pulmonary embolism. Her son states that there are no contraindications to CTA, no allergies to contrast or hx of renal insufficiency.   - F/u D-dimer and US lower extremities. Consider CT Angio Chest.  - check cardiac enzymes, serum pro-BNP  - Continue O2 supplementation as required and wean off as tolerated  - Start Bi-Level PAP (has NIV with CPAP at home but cannot tolerate) - given low dose ativan to help facilitate this. May need precedex gtt. High risk for respiratory collapse requiring intubation.  - GOC have not been elucidated, her outpatient physician went over end of life care including possibility of tracheostomy vs palliative route. patient refuses to make a decision and has not shared her thoughts with her son/HCP Lambert. Patient is able to make her own decisions at this time. As such, she is a full code.  - Pulmonology (Dr. Choi) consulted, f/u recs  - Palliative care consulted (Dr. Pranav Edouard), f/u recs  - Pt accepted to ICU for further management

## 2020-10-18 NOTE — DIETITIAN INITIAL EVALUATION ADULT. - ENTERAL
When medically feasible Recommend Jevity 1.5, 237cc(1 can) bolused 5x/day via peg (providing 1778kcal & 75gm protein)

## 2020-10-18 NOTE — CHART NOTE - NSCHARTNOTEFT_GEN_A_CORE
Hospitalist Attending Chart Update / Progress Note    Please see H&P written early today by Dr. Simmons, for more information.     Pt seen and examined by me.     Pt had been on/off BiPAP during the day. I saw her on NC without significant respiratory distress. Pt has advanced ALS and cannot speak, making ROS very limited.  Pt has decreased breath sounds on exam. Oral secretions. Has some limited movement in her left arm and neck, but overall quadriplegic.     Vital Signs Last 24 Hrs  T(C): 36.7 (18 Oct 2020 18:00), Max: 37.1 (18 Oct 2020 06:15)  T(F): 98 (18 Oct 2020 18:00), Max: 98.8 (18 Oct 2020 06:15)  HR: 98 (18 Oct 2020 19:00) (90 - 113)  BP: 141/72 (18 Oct 2020 19:00) (79/53 - 161/100)  BP(mean): 99 (18 Oct 2020 19:00) (84 - 115)  RR: 27 (18 Oct 2020 19:00) (20 - 44)  SpO2: 100% (18 Oct 2020 19:00) (89% - 100%)                        12.2   7.47  )-----------( 210      ( 18 Oct 2020 08:01 )             33.3     CBC Full  -  ( 18 Oct 2020 08:01 )  WBC Count : 7.47 K/uL  Hemoglobin : 12.2 g/dL  Hematocrit : 33.3 %  Platelet Count - Automated : 210 K/uL  Mean Cell Volume : 96.2 fl  Mean Cell Hemoglobin : 35.3 pg  Mean Cell Hemoglobin Concentration : 36.6 gm/dL  Auto Neutrophil # : 6.30 K/uL  Auto Lymphocyte # : 0.60 K/uL  Auto Monocyte # : 0.53 K/uL  Auto Eosinophil # : 0.00 K/uL  Auto Basophil # : 0.01 K/uL  Auto Neutrophil % : 84.4 %  Auto Lymphocyte % : 8.0 %  Auto Monocyte % : 7.1 %  Auto Eosinophil % : 0.0 %  Auto Basophil % : 0.1 %    10-18    141  |  102  |  17  ----------------------------<  94  3.6   |  32<H>  |  0.34<L>    Ca    9.1      18 Oct 2020 08:01  Phos  3.6     10-18  Mg     1.9     10-18    TPro  8.7<H>  /  Alb  2.7<L>  /  TBili  0.6  /  DBili  x   /  AST  18  /  ALT  36  /  AlkPhos  130<H>  10-18      A&P:  70 y/o F with PMHx of HTN, advanced ALS, s/p PEG tube on tube feedings, quadriplegic, presented to the ED with a chief complaint of dyspnea a/w acute hypoxic and hypercapnic respiratory failure likely in the setting of progressive ALS.  -pt, pt's family, ICU team discussed GOC and decided pt to be DNR/DNI, no trach.   -interest in having further equipment / help at home   -pt already has BiPAP at home which she will continue to use  -pt has suction to help manage secretions at home ; wishes to have female external catheter to help manage incontinence which they can hook up to suction they have at home  -SW/CM consult to help with obtaining some home equipment if feasible  -palliative care (Dr. Pranav Edouard) to be consulted tomorrow

## 2020-10-19 LAB
ANION GAP SERPL CALC-SCNC: 4 MMOL/L — LOW (ref 5–17)
BASOPHILS # BLD AUTO: 0.03 K/UL — SIGNIFICANT CHANGE UP (ref 0–0.2)
BASOPHILS NFR BLD AUTO: 0.3 % — SIGNIFICANT CHANGE UP (ref 0–2)
BUN SERPL-MCNC: 10 MG/DL — SIGNIFICANT CHANGE UP (ref 7–23)
CALCIUM SERPL-MCNC: 9.8 MG/DL — SIGNIFICANT CHANGE UP (ref 8.5–10.1)
CHLORIDE SERPL-SCNC: 97 MMOL/L — SIGNIFICANT CHANGE UP (ref 96–108)
CO2 SERPL-SCNC: 35 MMOL/L — HIGH (ref 22–31)
CREAT SERPL-MCNC: 0.29 MG/DL — LOW (ref 0.5–1.3)
EOSINOPHIL # BLD AUTO: 0.01 K/UL — SIGNIFICANT CHANGE UP (ref 0–0.5)
EOSINOPHIL NFR BLD AUTO: 0.1 % — SIGNIFICANT CHANGE UP (ref 0–6)
GLUCOSE SERPL-MCNC: 128 MG/DL — HIGH (ref 70–99)
HCT VFR BLD CALC: 36.1 % — SIGNIFICANT CHANGE UP (ref 34.5–45)
HGB BLD-MCNC: 13 G/DL — SIGNIFICANT CHANGE UP (ref 11.5–15.5)
IMM GRANULOCYTES NFR BLD AUTO: 0.6 % — SIGNIFICANT CHANGE UP (ref 0–1.5)
LYMPHOCYTES # BLD AUTO: 0.92 K/UL — LOW (ref 1–3.3)
LYMPHOCYTES # BLD AUTO: 9.2 % — LOW (ref 13–44)
MAGNESIUM SERPL-MCNC: 1.6 MG/DL — SIGNIFICANT CHANGE UP (ref 1.6–2.6)
MCHC RBC-ENTMCNC: 34.9 PG — HIGH (ref 27–34)
MCHC RBC-ENTMCNC: 36 GM/DL — SIGNIFICANT CHANGE UP (ref 32–36)
MCV RBC AUTO: 96.8 FL — SIGNIFICANT CHANGE UP (ref 80–100)
MONOCYTES # BLD AUTO: 0.87 K/UL — SIGNIFICANT CHANGE UP (ref 0–0.9)
MONOCYTES NFR BLD AUTO: 8.7 % — SIGNIFICANT CHANGE UP (ref 2–14)
NEUTROPHILS # BLD AUTO: 8.1 K/UL — HIGH (ref 1.8–7.4)
NEUTROPHILS NFR BLD AUTO: 81.1 % — HIGH (ref 43–77)
NRBC # BLD: 0 /100 WBCS — SIGNIFICANT CHANGE UP (ref 0–0)
PHOSPHATE SERPL-MCNC: 2.9 MG/DL — SIGNIFICANT CHANGE UP (ref 2.5–4.5)
PLATELET # BLD AUTO: 247 K/UL — SIGNIFICANT CHANGE UP (ref 150–400)
POTASSIUM SERPL-MCNC: 3.1 MMOL/L — LOW (ref 3.5–5.3)
POTASSIUM SERPL-SCNC: 3.1 MMOL/L — LOW (ref 3.5–5.3)
RBC # BLD: 3.73 M/UL — LOW (ref 3.8–5.2)
RBC # FLD: 12.2 % — SIGNIFICANT CHANGE UP (ref 10.3–14.5)
SODIUM SERPL-SCNC: 136 MMOL/L — SIGNIFICANT CHANGE UP (ref 135–145)
WBC # BLD: 9.99 K/UL — SIGNIFICANT CHANGE UP (ref 3.8–10.5)
WBC # FLD AUTO: 9.99 K/UL — SIGNIFICANT CHANGE UP (ref 3.8–10.5)

## 2020-10-19 PROCEDURE — 99291 CRITICAL CARE FIRST HOUR: CPT

## 2020-10-19 PROCEDURE — 99233 SBSQ HOSP IP/OBS HIGH 50: CPT

## 2020-10-19 RX ORDER — LINACLOTIDE 145 UG/1
290 CAPSULE, GELATIN COATED ORAL
Refills: 0 | Status: DISCONTINUED | OUTPATIENT
Start: 2020-10-19 | End: 2020-10-21

## 2020-10-19 RX ORDER — CHLORHEXIDINE GLUCONATE 213 G/1000ML
1 SOLUTION TOPICAL DAILY
Refills: 0 | Status: DISCONTINUED | OUTPATIENT
Start: 2020-10-19 | End: 2020-10-20

## 2020-10-19 RX ORDER — POTASSIUM CHLORIDE 20 MEQ
40 PACKET (EA) ORAL EVERY 4 HOURS
Refills: 0 | Status: COMPLETED | OUTPATIENT
Start: 2020-10-19 | End: 2020-10-19

## 2020-10-19 RX ADMIN — AMLODIPINE BESYLATE 5 MILLIGRAM(S): 2.5 TABLET ORAL at 06:00

## 2020-10-19 RX ADMIN — ROBINUL 2 MILLIGRAM(S): 0.2 INJECTION INTRAMUSCULAR; INTRAVENOUS at 11:51

## 2020-10-19 RX ADMIN — ESCITALOPRAM OXALATE 20 MILLIGRAM(S): 10 TABLET, FILM COATED ORAL at 11:52

## 2020-10-19 RX ADMIN — Medication 40 MILLIEQUIVALENT(S): at 11:50

## 2020-10-19 RX ADMIN — CHLORHEXIDINE GLUCONATE 1 APPLICATION(S): 213 SOLUTION TOPICAL at 06:00

## 2020-10-19 RX ADMIN — LINACLOTIDE 290 MICROGRAM(S): 145 CAPSULE, GELATIN COATED ORAL at 06:04

## 2020-10-19 RX ADMIN — SODIUM CHLORIDE 30 MILLILITER(S): 9 INJECTION, SOLUTION INTRAVENOUS at 22:14

## 2020-10-19 RX ADMIN — ENOXAPARIN SODIUM 40 MILLIGRAM(S): 100 INJECTION SUBCUTANEOUS at 11:51

## 2020-10-19 RX ADMIN — POLYETHYLENE GLYCOL 3350 17 GRAM(S): 17 POWDER, FOR SOLUTION ORAL at 17:27

## 2020-10-19 RX ADMIN — SODIUM CHLORIDE 30 MILLILITER(S): 9 INJECTION, SOLUTION INTRAVENOUS at 06:40

## 2020-10-19 RX ADMIN — Medication 40 MILLIEQUIVALENT(S): at 17:27

## 2020-10-19 RX ADMIN — ROBINUL 1 MILLIGRAM(S): 0.2 INJECTION INTRAMUSCULAR; INTRAVENOUS at 22:14

## 2020-10-19 RX ADMIN — SENNA PLUS 2 TABLET(S): 8.6 TABLET ORAL at 22:14

## 2020-10-19 RX ADMIN — POLYETHYLENE GLYCOL 3350 17 GRAM(S): 17 POWDER, FOR SOLUTION ORAL at 06:01

## 2020-10-19 RX ADMIN — RILUZOLE 50 MILLIGRAM(S): 50 TABLET ORAL at 06:00

## 2020-10-19 RX ADMIN — RILUZOLE 50 MILLIGRAM(S): 50 TABLET ORAL at 17:27

## 2020-10-19 NOTE — PROGRESS NOTE ADULT - PROBLEM SELECTOR PLAN 4
Per son pt has been constipated since PEG placement (w/ Dr. ROGER Timmons) with minimal BMs, last on 10/17. CXR with evidence of significant constipation.  - GI (Dr. Blake) consulted, recs appreciated  - c/w linzess

## 2020-10-19 NOTE — PROGRESS NOTE ADULT - SUBJECTIVE AND OBJECTIVE BOX
INTERVAL HPI/OVERNIGHT EVENTS:  pt seen and examined earlier this am  on nc  no vomiting no bm overnight per nursing   +bm yesterday    MEDICATIONS  (STANDING):  amLODIPine   Tablet 5 milliGRAM(s) Oral daily  chlorhexidine 2% Cloths 1 Application(s) Topical daily  chlorhexidine 4% Liquid 1 Application(s) Topical <User Schedule>  dextrose 5% + sodium chloride 0.9%. 1000 milliLiter(s) (30 mL/Hr) IV Continuous <Continuous>  enoxaparin Injectable 40 milliGRAM(s) SubCutaneous daily  escitalopram 20 milliGRAM(s) Oral daily  glycopyrrolate 1 milliGRAM(s) Oral at bedtime  glycopyrrolate 2 milliGRAM(s) Oral daily  linaclotide 290 MICROGram(s) Oral <User Schedule>  pantoprazole  Injectable 40 milliGRAM(s) IV Push daily  polyethylene glycol 3350 17 Gram(s) Oral two times a day  potassium chloride   Powder 40 milliEquivalent(s) Oral every 4 hours  riluzole 50 milliGRAM(s) Oral two times a day  senna 2 Tablet(s) Oral at bedtime    MEDICATIONS  (PRN):  acetaminophen   Tablet .. 650 milliGRAM(s) Oral every 6 hours PRN Mild Pain (1 - 3)  guaiFENesin   Syrup  (Sugar-Free) 200 milliGRAM(s) Oral every 6 hours PRN Cough      Allergies    Macrobid (Unknown)  Zithromax (Unknown)    Intolerances    sulfa drugs (Other)      Review of Systems:    unable to obtain       Vital Signs Last 24 Hrs  T(C): 36.4 (19 Oct 2020 12:00), Max: 36.9 (18 Oct 2020 23:30)  T(F): 97.5 (19 Oct 2020 12:00), Max: 98.4 (18 Oct 2020 23:30)  HR: 104 (19 Oct 2020 12:00) (95 - 124)  BP: 128/71 (19 Oct 2020 12:00) (119/64 - 171/91)  BP(mean): 92 (19 Oct 2020 12:00) (85 - 123)  RR: 26 (19 Oct 2020 12:00) (22 - 60)  SpO2: 98% (19 Oct 2020 12:00) (92% - 100%)    PHYSICAL EXAM:    Constitutional: lying in bed  HEENT: ncat  Gastrointestinal: soft nt nd +peg  Extremities: mild edema  Vascular: + peripheral pulses  Neurological: lethargic    LABS:                        13.0   9.99  )-----------( 247      ( 19 Oct 2020 05:25 )             36.1     10-19    136  |  97  |  10  ----------------------------<  128<H>  3.1<L>   |  35<H>  |  0.29<L>    Ca    9.8      19 Oct 2020 05:25  Phos  2.9     10-19  Mg     1.6     10-19    TPro  8.7<H>  /  Alb  2.7<L>  /  TBili  0.6  /  DBili  x   /  AST  18  /  ALT  36  /  AlkPhos  130<H>  10-18    PT/INR - ( 18 Oct 2020 08:01 )   PT: 12.5 sec;   INR: 1.07 ratio         PTT - ( 18 Oct 2020 08:01 )  PTT:42.9 sec  Urinalysis Basic - ( 18 Oct 2020 09:19 )    Color: Pale Yellow / Appearance: Slightly Turbid / S.010 / pH: x  Gluc: x / Ketone: Negative  / Bili: Negative / Urobili: Negative   Blood: x / Protein: Negative / Nitrite: Negative   Leuk Esterase: Trace / RBC: x / WBC 3-5   Sq Epi: x / Non Sq Epi: Few / Bacteria: Occasional        RADIOLOGY & ADDITIONAL TESTS:

## 2020-10-19 NOTE — PROGRESS NOTE ADULT - SUBJECTIVE AND OBJECTIVE BOX
Date/Time Patient Seen:  		  Referring MD:   Data Reviewed	       Patient is a 69y old  Female who presents with a chief complaint of acute hypoxic respiratory failure (18 Oct 2020 10:18)      Subjective/HPI     PAST MEDICAL & SURGICAL HISTORY:  HTN (hypertension)    ALS (amyotrophic lateral sclerosis)    S/P shoulder replacement, left    S/P percutaneous endoscopic gastrostomy (PEG) tube placement    S/P brain surgery  for neuroma    No significant past surgical history          Medication list         MEDICATIONS  (STANDING):  amLODIPine   Tablet 5 milliGRAM(s) Oral daily  chlorhexidine 4% Liquid 1 Application(s) Topical <User Schedule>  dextrose 5% + sodium chloride 0.9%. 1000 milliLiter(s) (30 mL/Hr) IV Continuous <Continuous>  enoxaparin Injectable 40 milliGRAM(s) SubCutaneous daily  escitalopram 20 milliGRAM(s) Oral daily  glycopyrrolate 1 milliGRAM(s) Oral at bedtime  glycopyrrolate 2 milliGRAM(s) Oral daily  linaclotide 290 MICROGram(s) Oral <User Schedule>  pantoprazole  Injectable 40 milliGRAM(s) IV Push daily  polyethylene glycol 3350 17 Gram(s) Oral two times a day  riluzole 50 milliGRAM(s) Oral two times a day  senna 2 Tablet(s) Oral at bedtime    MEDICATIONS  (PRN):  acetaminophen   Tablet .. 650 milliGRAM(s) Oral every 6 hours PRN Mild Pain (1 - 3)  guaiFENesin   Syrup  (Sugar-Free) 200 milliGRAM(s) Oral every 6 hours PRN Cough         Vitals log        ICU Vital Signs Last 24 Hrs  T(C): 36.8 (19 Oct 2020 04:00), Max: 36.9 (18 Oct 2020 23:30)  T(F): 98.3 (19 Oct 2020 04:00), Max: 98.4 (18 Oct 2020 23:30)  HR: 100 (19 Oct 2020 07:00) (95 - 124)  BP: 124/66 (19 Oct 2020 07:00) (118/64 - 171/91)  BP(mean): 89 (19 Oct 2020 07:00) (84 - 123)  ABP: --  ABP(mean): --  RR: 23 (19 Oct 2020 07:00) (20 - 60)  SpO2: 96% (19 Oct 2020 07:00) (92% - 100%)           Input and Output:  I&O's Detail    18 Oct 2020 07:01  -  19 Oct 2020 07:00  --------------------------------------------------------  IN:    dextrose 5% + sodium chloride 0.9%: 420 mL    Enteral Tube Flush: 120 mL    IV PiggyBack: 300 mL  Total IN: 840 mL    OUT:    Voided (mL): 1200 mL  Total OUT: 1200 mL    Total NET: -360 mL          Lab Data                        13.0   9.99  )-----------( 247      ( 19 Oct 2020 05:25 )             36.1     10-19    136  |  97  |  10  ----------------------------<  128<H>  3.1<L>   |  35<H>  |  0.29<L>    Ca    9.8      19 Oct 2020 05:25  Phos  2.9     10-19  Mg     1.6     10-19    TPro  8.7<H>  /  Alb  2.7<L>  /  TBili  0.6  /  DBili  x   /  AST  18  /  ALT  36  /  AlkPhos  130<H>  10-18    ABG - ( 18 Oct 2020 06:31 )  pH, Arterial: 7.29  pH, Blood: x     /  pCO2: 70    /  pO2: 112   / HCO3: 29    / Base Excess: 6.5   /  SaO2: 97                CARDIAC MARKERS ( 18 Oct 2020 08:01 )  .034 ng/mL / x     / 71 U/L / x     / 17.2 ng/mL  CARDIAC MARKERS ( 18 Oct 2020 00:48 )  .022 ng/mL / x     / 75 U/L / x     / 19.1 ng/mL        Review of Systems	      Objective     Physical Examination    heart s1s2  lung dec BS  abd soft      Pertinent Lab findings & Imaging      Ginger:  NO   Adequate UO     I&O's Detail    18 Oct 2020 07:01  -  19 Oct 2020 07:00  --------------------------------------------------------  IN:    dextrose 5% + sodium chloride 0.9%: 420 mL    Enteral Tube Flush: 120 mL    IV PiggyBack: 300 mL  Total IN: 840 mL    OUT:    Voided (mL): 1200 mL  Total OUT: 1200 mL    Total NET: -360 mL               Discussed with:     Cultures:	        Radiology

## 2020-10-19 NOTE — PROGRESS NOTE ADULT - SUBJECTIVE AND OBJECTIVE BOX
Patient is a 69y old  Female who presents with a chief complaint of SOB.      INTERVAL HPI/OVERNIGHT EVENTS: Pt is nonverbal and cannot provide ROS. No acute events overnight. Pt currently on NC and breathing comfortably.    MEDICATIONS  (STANDING):  amLODIPine   Tablet 5 milliGRAM(s) Oral daily  dextrose 5% + sodium chloride 0.9%. 1000 milliLiter(s) (30 mL/Hr) IV Continuous <Continuous>  enoxaparin Injectable 40 milliGRAM(s) SubCutaneous daily  escitalopram 20 milliGRAM(s) Oral daily  glycopyrrolate 1 milliGRAM(s) Oral at bedtime  glycopyrrolate 2 milliGRAM(s) Oral daily  linaclotide 290 MICROGram(s) Oral <User Schedule>  pantoprazole  Injectable 40 milliGRAM(s) IV Push daily  polyethylene glycol 3350 17 Gram(s) Oral two times a day  riluzole 50 milliGRAM(s) Oral two times a day  senna 2 Tablet(s) Oral at bedtime    MEDICATIONS  (PRN):  acetaminophen   Tablet .. 650 milliGRAM(s) Oral every 6 hours PRN Mild Pain (1 - 3)  guaiFENesin   Syrup  (Sugar-Free) 200 milliGRAM(s) Oral every 6 hours PRN Cough      Allergies    Macrobid (Unknown)  Zithromax (Unknown)    Intolerances    sulfa drugs (Other)      REVIEW OF SYSTEMS:  Pt is nonverbal and cannot provide ROS. No acute events overnight.    Vital Signs Last 24 Hrs  T(C): 36.4 (19 Oct 2020 12:00), Max: 36.9 (18 Oct 2020 23:30)  T(F): 97.5 (19 Oct 2020 12:00), Max: 98.4 (18 Oct 2020 23:30)  HR: 104 (19 Oct 2020 12:00) (95 - 124)  BP: 128/71 (19 Oct 2020 12:00) (119/64 - 171/91)  BP(mean): 92 (19 Oct 2020 12:00) (85 - 123)  RR: 26 (19 Oct 2020 12:00) (22 - 60)  SpO2: 98% (19 Oct 2020 12:00) (92% - 100%)    PHYSICAL EXAM:  GENERAL: NAD  HEENT:  anicteric, +oral secretions  CHEST/LUNG:  decreased breath sounds, poor inspiration   HEART:  RRR, S1, S2  ABDOMEN:  BS+, soft, nontender, nondistended; PEG in place  EXTREMITIES: no edema, cyanosis.   NERVOUS SYSTEM: nonverbal, nearly quadriplegic with some minimal movement in arms    LABS:                        13.0   9.99  )-----------( 247      ( 19 Oct 2020 05:25 )             36.1     CBC Full  -  ( 19 Oct 2020 05:25 )  WBC Count : 9.99 K/uL  Hemoglobin : 13.0 g/dL  Hematocrit : 36.1 %  Platelet Count - Automated : 247 K/uL  Mean Cell Volume : 96.8 fl  Mean Cell Hemoglobin : 34.9 pg  Mean Cell Hemoglobin Concentration : 36.0 gm/dL  Auto Neutrophil # : 8.10 K/uL  Auto Lymphocyte # : 0.92 K/uL  Auto Monocyte # : 0.87 K/uL  Auto Eosinophil # : 0.01 K/uL  Auto Basophil # : 0.03 K/uL  Auto Neutrophil % : 81.1 %  Auto Lymphocyte % : 9.2 %  Auto Monocyte % : 8.7 %  Auto Eosinophil % : 0.1 %  Auto Basophil % : 0.3 %    19 Oct 2020 05:25    136    |  97     |  10     ----------------------------<  128    3.1     |  35     |  0.29     Ca    9.8        19 Oct 2020 05:25  Phos  2.9       19 Oct 2020 05:25  Mg     1.6       19 Oct 2020 05:25      PT/INR - ( 18 Oct 2020 08:01 )   PT: 12.5 sec;   INR: 1.07 ratio         PTT - ( 18 Oct 2020 08:01 )  PTT:42.9 sec  Urinalysis Basic - ( 18 Oct 2020 09:19 )    Color: Pale Yellow / Appearance: Slightly Turbid / S.010 / pH: x  Gluc: x / Ketone: Negative  / Bili: Negative / Urobili: Negative   Blood: x / Protein: Negative / Nitrite: Negative   Leuk Esterase: Trace / RBC: x / WBC 3-5   Sq Epi: x / Non Sq Epi: Few / Bacteria: Occasional      CAPILLARY BLOOD GLUCOSE        POCT Blood Glucose.: 105 mg/dL (19 Oct 2020 11:32)  POCT Blood Glucose.: 137 mg/dL (19 Oct 2020 06:12)        Culture - Blood (collected 10-18-20 @ 04:39)  Source: .Blood Blood-Peripheral  Preliminary Report (10-19-20 @ 05:00):    No growth to date.    Culture - Blood (collected 10-18-20 @ 04:39)  Source: .Blood Blood-Peripheral  Preliminary Report (10-19-20 @ 05:00):    No growth to date.        RADIOLOGY & ADDITIONAL TESTS:    Personally reviewed.     Consultant(s) Notes Reviewed:  [x] YES  [ ] NO     Patient is a 69y old  Female who presents with a chief complaint of SOB.      INTERVAL HPI/OVERNIGHT EVENTS: Pt is nonverbal and cannot provide ROS. No acute events overnight. Pt currently on NC and breathing comfortably.     MEDICATIONS  (STANDING):  amLODIPine   Tablet 5 milliGRAM(s) Oral daily  dextrose 5% + sodium chloride 0.9%. 1000 milliLiter(s) (30 mL/Hr) IV Continuous <Continuous>  enoxaparin Injectable 40 milliGRAM(s) SubCutaneous daily  escitalopram 20 milliGRAM(s) Oral daily  glycopyrrolate 1 milliGRAM(s) Oral at bedtime  glycopyrrolate 2 milliGRAM(s) Oral daily  linaclotide 290 MICROGram(s) Oral <User Schedule>  pantoprazole  Injectable 40 milliGRAM(s) IV Push daily  polyethylene glycol 3350 17 Gram(s) Oral two times a day  riluzole 50 milliGRAM(s) Oral two times a day  senna 2 Tablet(s) Oral at bedtime    MEDICATIONS  (PRN):  acetaminophen   Tablet .. 650 milliGRAM(s) Oral every 6 hours PRN Mild Pain (1 - 3)  guaiFENesin   Syrup  (Sugar-Free) 200 milliGRAM(s) Oral every 6 hours PRN Cough      Allergies    Macrobid (Unknown)  Zithromax (Unknown)    Intolerances    sulfa drugs (Other)      REVIEW OF SYSTEMS:  Pt is nonverbal and cannot provide ROS. No acute events overnight.    Vital Signs Last 24 Hrs  T(C): 36.4 (19 Oct 2020 12:00), Max: 36.9 (18 Oct 2020 23:30)  T(F): 97.5 (19 Oct 2020 12:00), Max: 98.4 (18 Oct 2020 23:30)  HR: 104 (19 Oct 2020 12:00) (95 - 124)  BP: 128/71 (19 Oct 2020 12:00) (119/64 - 171/91)  BP(mean): 92 (19 Oct 2020 12:00) (85 - 123)  RR: 26 (19 Oct 2020 12:00) (22 - 60)  SpO2: 98% (19 Oct 2020 12:00) (92% - 100%)    PHYSICAL EXAM:  GENERAL: NAD  HEENT:  anicteric, +oral secretions  CHEST/LUNG:  decreased breath sounds, poor inspiration   HEART:  RRR, S1, S2  ABDOMEN:  BS+, soft, nontender, nondistended; PEG in place  EXTREMITIES: no edema, cyanosis.   NERVOUS SYSTEM: nonverbal, nearly quadriplegic with some minimal movement in arms    LABS:                        13.0   9.99  )-----------( 247      ( 19 Oct 2020 05:25 )             36.1     CBC Full  -  ( 19 Oct 2020 05:25 )  WBC Count : 9.99 K/uL  Hemoglobin : 13.0 g/dL  Hematocrit : 36.1 %  Platelet Count - Automated : 247 K/uL  Mean Cell Volume : 96.8 fl  Mean Cell Hemoglobin : 34.9 pg  Mean Cell Hemoglobin Concentration : 36.0 gm/dL  Auto Neutrophil # : 8.10 K/uL  Auto Lymphocyte # : 0.92 K/uL  Auto Monocyte # : 0.87 K/uL  Auto Eosinophil # : 0.01 K/uL  Auto Basophil # : 0.03 K/uL  Auto Neutrophil % : 81.1 %  Auto Lymphocyte % : 9.2 %  Auto Monocyte % : 8.7 %  Auto Eosinophil % : 0.1 %  Auto Basophil % : 0.3 %    19 Oct 2020 05:25    136    |  97     |  10     ----------------------------<  128    3.1     |  35     |  0.29     Ca    9.8        19 Oct 2020 05:25  Phos  2.9       19 Oct 2020 05:25  Mg     1.6       19 Oct 2020 05:25      PT/INR - ( 18 Oct 2020 08:01 )   PT: 12.5 sec;   INR: 1.07 ratio         PTT - ( 18 Oct 2020 08:01 )  PTT:42.9 sec  Urinalysis Basic - ( 18 Oct 2020 09:19 )    Color: Pale Yellow / Appearance: Slightly Turbid / S.010 / pH: x  Gluc: x / Ketone: Negative  / Bili: Negative / Urobili: Negative   Blood: x / Protein: Negative / Nitrite: Negative   Leuk Esterase: Trace / RBC: x / WBC 3-5   Sq Epi: x / Non Sq Epi: Few / Bacteria: Occasional      CAPILLARY BLOOD GLUCOSE        POCT Blood Glucose.: 105 mg/dL (19 Oct 2020 11:32)  POCT Blood Glucose.: 137 mg/dL (19 Oct 2020 06:12)        Culture - Blood (collected 10-18-20 @ 04:39)  Source: .Blood Blood-Peripheral  Preliminary Report (10-19-20 @ 05:00):    No growth to date.    Culture - Blood (collected 10-18-20 @ 04:39)  Source: .Blood Blood-Peripheral  Preliminary Report (10-19-20 @ 05:00):    No growth to date.        RADIOLOGY & ADDITIONAL TESTS:    Personally reviewed.     Consultant(s) Notes Reviewed:  [x] YES  [ ] NO

## 2020-10-19 NOTE — PROGRESS NOTE ADULT - SUBJECTIVE AND OBJECTIVE BOX
Patient is a 69y old  Female who presents with a chief complaint of acute hypoxic respiratory failure (19 Oct 2020 07:13)    24 hour events: ***    REVIEW OF SYSTEMS  Constitutional: No fever, chills, fatigue  Neuro: No headache, numbness, weakness  Resp: No cough, wheezing, shortness of breath  CVS: No chest pain, palpitations, leg swelling  GI: No abdominal pain, nausea, vomiting, diarrhea   : No dysuria, frequency, incontinence  Skin: No itching, burning, rashes, or lesions   Msk: No joint pain or swelling  Psych: No depression, anxiety, mood swings  Heme: No bleeding    T(F): 98.3 (10-19-20 @ 04:00), Max: 98.4 (10-18-20 @ 23:30)  HR: 100 (10-19-20 @ 07:00) (95 - 124)  BP: 124/66 (10-19-20 @ 07:00) (118/64 - 171/91)  RR: 23 (10-19-20 @ 07:00) (20 - 60)  SpO2: 96% (10-19-20 @ 07:00) (92% - 100%)  Wt(kg): --            I&O's Summary    10-18 @ 07:01  -  10-19 @ 07:00  --------------------------------------------------------  IN: 840 mL / OUT: 1200 mL / NET: -360 mL      PHYSICAL EXAM  General:   CNS:   HEENT:   Resp:   CVS:   Abd:   Ext:   Skin:     MEDICATIONS    amLODIPine   Tablet Oral      guaiFENesin   Syrup  (Sugar-Free) Oral PRN    acetaminophen   Tablet .. Oral PRN  escitalopram Oral  riluzole Oral      enoxaparin Injectable SubCutaneous    glycopyrrolate Oral  glycopyrrolate Oral  linaclotide Oral  pantoprazole  Injectable IV Push  polyethylene glycol 3350 Oral  senna Oral      dextrose 5% + sodium chloride 0.9%. IV Continuous      chlorhexidine 4% Liquid Topical                            13.0   9.99  )-----------( 247      ( 19 Oct 2020 05:25 )             36.1       10-19    136  |  97  |  10  ----------------------------<  128<H>  3.1<L>   |  35<H>  |  0.29<L>    Ca    9.8      19 Oct 2020 05:25  Phos  2.9     10-19  Mg     1.6     10-19    TPro  8.7<H>  /  Alb  2.7<L>  /  TBili  0.6  /  DBili  x   /  AST  18  /  ALT  36  /  AlkPhos  130<H>  10-18      CARDIAC MARKERS ( 18 Oct 2020 08:01 )  .034 ng/mL / x     / 71 U/L / x     / 17.2 ng/mL  CARDIAC MARKERS ( 18 Oct 2020 00:48 )  .022 ng/mL / x     / 75 U/L / x     / 19.1 ng/mL      PT/INR - ( 18 Oct 2020 08:01 )   PT: 12.5 sec;   INR: 1.07 ratio         PTT - ( 18 Oct 2020 08:01 )  PTT:42.9 sec  Urinalysis Basic - ( 18 Oct 2020 09:19 )    Color: Pale Yellow / Appearance: Slightly Turbid / S.010 / pH: x  Gluc: x / Ketone: Negative  / Bili: Negative / Urobili: Negative   Blood: x / Protein: Negative / Nitrite: Negative   Leuk Esterase: Trace / RBC: x / WBC 3-5   Sq Epi: x / Non Sq Epi: Few / Bacteria: Occasional      .Blood Blood-Peripheral   No growth to date. -- 10-18 @ 04:39        Radiology: ***  Bedside lung ultrasound: ***  Bedside ECHO: ***    CENTRAL LINE: Y/N          DATE INSERTED:              REMOVE: Y/N  COHEN: Y/N                        DATE INSERTED:              REMOVE: Y/N  A-LINE: Y/N                       DATE INSERTED:              REMOVE: Y/N    GLOBAL ISSUE/BEST PRACTICE  Analgesia:   Sedation:   CAM-ICU:   HOB elevation: yes  Stress ulcer prophylaxis:   VTE prophylaxis:   Glycemic control:   Nutrition:     CODE STATUS: ***  [facility] discussion: Y       Patient is a 69y old  Female who presents with a chief complaint of acute hypoxic respiratory failure (19 Oct 2020 07:13)    History: 68 yo f pmhx End Stage ALS with recent PEG placement (second one), and HTN biba from home with worsening SOB.  Patient is a difficult historian 2/2 communication limitations but nods yes to sob that started a few days ago and has since progressed.  Upon presentation to ED patient hypoxic to 88% on RA, placed on NC with quickly escalating requirements.  Patient's wob progressed, patient extremely anxious, requiring increasing oxygen requirements, ultimately placed on NRB prompting ICU consult.  Patient seen at bedside, spo2 100% on NRB, ABG obtained 7.3/71/267/30/7.5.  Patient placed on BiPAP and admitted to ICU.        PAST MEDICAL & SURGICAL HISTORY:  HTN (hypertension)  ALS (amyotrophic lateral sclerosis)  S/P percutaneous endoscopic gastrostomy (PEG) tube placement  S/P brain surgery  neuroma      24 hour events: ***    REVIEW OF SYSTEMS  Constitutional: No fever, chills, fatigue  Neuro: No headache, numbness, weakness  Resp: No cough, wheezing, shortness of breath  CVS: No chest pain, palpitations, leg swelling  GI: No abdominal pain, nausea, vomiting, diarrhea   : No dysuria, frequency, incontinence  Skin: No itching, burning, rashes, or lesions   Msk: No joint pain or swelling  Psych: No depression, anxiety, mood swings  Heme: No bleeding    T(F): 98.3 (10-19-20 @ 04:00), Max: 98.4 (10-18-20 @ 23:30)  HR: 100 (10-19-20 @ 07:00) (95 - 124)  BP: 124/66 (10-19-20 @ 07:00) (118/64 - 171/91)  RR: 23 (10-19-20 @ 07:00) (20 - 60)  SpO2: 96% (10-19-20 @ 07:00) (92% - 100%)  Wt(kg): --            I&O's Summary    10-18 @ 07:01  -  10-19 @ 07:00  --------------------------------------------------------  IN: 840 mL / OUT: 1200 mL / NET: -360 mL      PHYSICAL EXAM  General:   CNS:   HEENT:   Resp:   CVS:   Abd:   Ext:   Skin:     MEDICATIONS    amLODIPine   Tablet Oral      guaiFENesin   Syrup  (Sugar-Free) Oral PRN    acetaminophen   Tablet .. Oral PRN  escitalopram Oral  riluzole Oral      enoxaparin Injectable SubCutaneous    glycopyrrolate Oral  glycopyrrolate Oral  linaclotide Oral  pantoprazole  Injectable IV Push  polyethylene glycol 3350 Oral  senna Oral      dextrose 5% + sodium chloride 0.9%. IV Continuous      chlorhexidine 4% Liquid Topical                            13.0   9.99  )-----------( 247      ( 19 Oct 2020 05:25 )             36.1       10-19    136  |  97  |  10  ----------------------------<  128<H>  3.1<L>   |  35<H>  |  0.29<L>    Ca    9.8      19 Oct 2020 05:25  Phos  2.9     10-19  Mg     1.6     10-19    TPro  8.7<H>  /  Alb  2.7<L>  /  TBili  0.6  /  DBili  x   /  AST  18  /  ALT  36  /  AlkPhos  130<H>  10-18      CARDIAC MARKERS ( 18 Oct 2020 08:01 )  .034 ng/mL / x     / 71 U/L / x     / 17.2 ng/mL  CARDIAC MARKERS ( 18 Oct 2020 00:48 )  .022 ng/mL / x     / 75 U/L / x     / 19.1 ng/mL      PT/INR - ( 18 Oct 2020 08:01 )   PT: 12.5 sec;   INR: 1.07 ratio         PTT - ( 18 Oct 2020 08:01 )  PTT:42.9 sec  Urinalysis Basic - ( 18 Oct 2020 09:19 )    Color: Pale Yellow / Appearance: Slightly Turbid / S.010 / pH: x  Gluc: x / Ketone: Negative  / Bili: Negative / Urobili: Negative   Blood: x / Protein: Negative / Nitrite: Negative   Leuk Esterase: Trace / RBC: x / WBC 3-5   Sq Epi: x / Non Sq Epi: Few / Bacteria: Occasional      .Blood Blood-Peripheral   No growth to date. -- 10-18 @ 04:39        Radiology: ***  Bedside lung ultrasound: ***  Bedside ECHO: ***    CENTRAL LINE: Y/N          DATE INSERTED:              REMOVE: Y/N  COHEN: Y/N                        DATE INSERTED:              REMOVE: Y/N  A-LINE: Y/N                       DATE INSERTED:              REMOVE: Y/N    GLOBAL ISSUE/BEST PRACTICE  Analgesia:   Sedation:   CAM-ICU:   HOB elevation: yes  Stress ulcer prophylaxis:   VTE prophylaxis:   Glycemic control:   Nutrition:     CODE STATUS: ***  [facility] discussion: Y       Patient is a 69y old  Female who presents with a chief complaint of acute hypoxic respiratory failure (19 Oct 2020 07:13)        24 hour events: No overnight events.    REVIEW OF SYSTEMS  Constitutional: No fever, chills, fatigue  Neuro: No headache, numbness, weakness  Resp: No cough, wheezing, shortness of breath  CVS: No chest pain, palpitations, leg swelling  GI: No abdominal pain, nausea, vomiting, diarrhea   : No dysuria, frequency, incontinence  Skin: No itching, burning, rashes, or lesions   Msk: No joint pain or swelling  Psych: No depression, anxiety, mood swings  Heme: No bleeding    T(F): 98.3 (10-19-20 @ 04:00), Max: 98.4 (10-18-20 @ 23:30)  HR: 100 (10-19-20 @ 07:00) (95 - 124)  BP: 124/66 (10-19-20 @ 07:00) (118/64 - 171/91)  RR: 23 (10-19-20 @ 07:00) (20 - 60)  SpO2: 96% (10-19-20 @ 07:00) (92% - 100%)  Wt(kg): --            I&O's Summary    10-18 @ 07:01  -  10-19 @ 07:00  --------------------------------------------------------  IN: 840 mL / OUT: 1200 mL / NET: -360 mL      PHYSICAL EXAM  General:   CNS:   HEENT:   Resp:   CVS:   Abd:   Ext:   Skin:     MEDICATIONS    amLODIPine   Tablet Oral      guaiFENesin   Syrup  (Sugar-Free) Oral PRN    acetaminophen   Tablet .. Oral PRN  escitalopram Oral  riluzole Oral      enoxaparin Injectable SubCutaneous    glycopyrrolate Oral  glycopyrrolate Oral  linaclotide Oral  pantoprazole  Injectable IV Push  polyethylene glycol 3350 Oral  senna Oral      dextrose 5% + sodium chloride 0.9%. IV Continuous      chlorhexidine 4% Liquid Topical                            13.0   9.99  )-----------( 247      ( 19 Oct 2020 05:25 )             36.1       10-19    136  |  97  |  10  ----------------------------<  128<H>  3.1<L>   |  35<H>  |  0.29<L>    Ca    9.8      19 Oct 2020 05:25  Phos  2.9     10-19  Mg     1.6     10-19    TPro  8.7<H>  /  Alb  2.7<L>  /  TBili  0.6  /  DBili  x   /  AST  18  /  ALT  36  /  AlkPhos  130<H>  10-18      CARDIAC MARKERS ( 18 Oct 2020 08:01 )  .034 ng/mL / x     / 71 U/L / x     / 17.2 ng/mL  CARDIAC MARKERS ( 18 Oct 2020 00:48 )  .022 ng/mL / x     / 75 U/L / x     / 19.1 ng/mL      PT/INR - ( 18 Oct 2020 08:01 )   PT: 12.5 sec;   INR: 1.07 ratio         PTT - ( 18 Oct 2020 08:01 )  PTT:42.9 sec  Urinalysis Basic - ( 18 Oct 2020 09:19 )    Color: Pale Yellow / Appearance: Slightly Turbid / S.010 / pH: x  Gluc: x / Ketone: Negative  / Bili: Negative / Urobili: Negative   Blood: x / Protein: Negative / Nitrite: Negative   Leuk Esterase: Trace / RBC: x / WBC 3-5   Sq Epi: x / Non Sq Epi: Few / Bacteria: Occasional      .Blood Blood-Peripheral   No growth to date. -- 10-18 @ 04:39        Radiology: ***  Bedside lung ultrasound: ***  Bedside ECHO: ***    CENTRAL LINE: Y/N          DATE INSERTED:              REMOVE: Y/N  COHEN: Y/N                        DATE INSERTED:              REMOVE: Y/N  A-LINE: Y/N                       DATE INSERTED:              REMOVE: Y/N    GLOBAL ISSUE/BEST PRACTICE  Analgesia:   Sedation:   CAM-ICU:   HOB elevation: yes  Stress ulcer prophylaxis:   VTE prophylaxis:   Glycemic control:   Nutrition:     CODE STATUS: ***  [facility] discussion: Y       Patient is a 69y old  Female who presents with a chief complaint of acute hypoxic respiratory failure (19 Oct 2020 07:13)        24 hour events: Patient on NC. No overnight events. Patient seen and examined at bedside. ROS is difficult to obtain and limited. Patient is nonverbal at baseline but can communicate with head nodding and hand squeezing. She answers yes to knee pain. However, she does not want pain medication at this time.    REVIEW OF SYSTEMS    Resp: No shortness of breath  CVS: No chest pain  GI: No abdominal pain,   Msk: right knee pain      T(F): 98.3 (10-19-20 @ 04:00), Max: 98.4 (10-18-20 @ 23:30)  HR: 100 (10-19-20 @ 07:00) (95 - 124)  BP: 124/66 (10-19-20 @ 07:00) (118/64 - 171/91)  RR: 23 (10-19-20 @ 07:00) (20 - 60)  SpO2: 96% (10-19-20 @ 07:00) (92% - 100%)  Wt(kg): --            I&O's Summary    10-18 @ 07:01  -  10-19 @ 07:00  --------------------------------------------------------  IN: 840 mL / OUT: 1200 mL / NET: -360 mL      PHYSICAL EXAM  General: Elderly, frail female, lying in bed  HEENT: PERRL  PULM: CTA bilaterally, no significant sputum production  CVS: tachycardic, regular rhythm, no murmurs, rubs, or gallops appreciated  ABD: Soft, mildly distended, +tender, normoactive bowel sounds, no masses appreciated, +PEG, area with mild surrounding erythema  EXT: Nonpitting edema, nontender  SKIN: Warm and well perfused  NEURO: Non-verbal at baseline due to progressing ALS, answers y/n questions with head nods and hand squeezing.     MEDICATIONS    amLODIPine   Tablet Oral      guaiFENesin   Syrup  (Sugar-Free) Oral PRN    acetaminophen   Tablet .. Oral PRN  escitalopram Oral  riluzole Oral      enoxaparin Injectable SubCutaneous    glycopyrrolate Oral  glycopyrrolate Oral  linaclotide Oral  pantoprazole  Injectable IV Push  polyethylene glycol 3350 Oral  senna Oral      dextrose 5% + sodium chloride 0.9%. IV Continuous      chlorhexidine 4% Liquid Topical                            13.0   9.99  )-----------( 247      ( 19 Oct 2020 05:25 )             36.1       10-19    136  |  97  |  10  ----------------------------<  128<H>  3.1<L>   |  35<H>  |  0.29<L>    Ca    9.8      19 Oct 2020 05:25  Phos  2.9     10-19  Mg     1.6     10-19    TPro  8.7<H>  /  Alb  2.7<L>  /  TBili  0.6  /  DBili  x   /  AST  18  /  ALT  36  /  AlkPhos  130<H>  10-18      CARDIAC MARKERS ( 18 Oct 2020 08:01 )  .034 ng/mL / x     / 71 U/L / x     / 17.2 ng/mL  CARDIAC MARKERS ( 18 Oct 2020 00:48 )  .022 ng/mL / x     / 75 U/L / x     / 19.1 ng/mL      PT/INR - ( 18 Oct 2020 08:01 )   PT: 12.5 sec;   INR: 1.07 ratio         PTT - ( 18 Oct 2020 08:01 )  PTT:42.9 sec  Urinalysis Basic - ( 18 Oct 2020 09:19 )    Color: Pale Yellow / Appearance: Slightly Turbid / S.010 / pH: x  Gluc: x / Ketone: Negative  / Bili: Negative / Urobili: Negative   Blood: x / Protein: Negative / Nitrite: Negative   Leuk Esterase: Trace / RBC: x / WBC 3-5   Sq Epi: x / Non Sq Epi: Few / Bacteria: Occasional      .Blood Blood-Peripheral   No growth to date. -- 10-18 @ 04:39        CENTRAL LINE: N   COHEN: N  A-LINE: N      GLOBAL ISSUE/BEST PRACTICE  Analgesia: Y  Sedation: N  HOB elevation: yes  Stress ulcer prophylaxis: y  VTE prophylaxis: y  Glycemic control: n  Nutrition: npo    CODE STATUS: FULL CODE         Patient is a 69y old  Female who presents with a chief complaint of acute hypoxic respiratory failure (19 Oct 2020 07:13)        24 hour events: Patient on NC. No overnight events. Patient seen and examined at bedside. ROS is difficult to obtain and limited. Patient is nonverbal at baseline but can communicate with head nodding and hand squeezing. She answers yes to knee pain. However, she does not want pain medication at this time.    REVIEW OF SYSTEMS    Resp: No shortness of breath  CVS: No chest pain  GI: No abdominal pain,   Msk: right knee pain      T(F): 98.3 (10-19-20 @ 04:00), Max: 98.4 (10-18-20 @ 23:30)  HR: 100 (10-19-20 @ 07:00) (95 - 124)  BP: 124/66 (10-19-20 @ 07:00) (118/64 - 171/91)  RR: 23 (10-19-20 @ 07:00) (20 - 60)  SpO2: 96% (10-19-20 @ 07:00) (92% - 100%)  Wt(kg): --            I&O's Summary    10-18 @ 07:01  -  10-19 @ 07:00  --------------------------------------------------------  IN: 840 mL / OUT: 1200 mL / NET: -360 mL      PHYSICAL EXAM  General: Elderly, frail female, lying in bed  HEENT: PERRL  PULM: CTA bilaterally, no significant sputum production  CVS: tachycardic, regular rhythm, no murmurs, rubs, or gallops appreciated  ABD: Soft, mildly distended, +tender, normoactive bowel sounds, no masses appreciated, +PEG, area with mild surrounding erythema  EXT: Nonpitting edema, nontender  SKIN: Warm and well perfused  NEURO: Non-verbal at baseline due to progressing ALS, answers y/n questions with head nods and hand squeezing.     MEDICATIONS    amLODIPine   Tablet Oral      guaiFENesin   Syrup  (Sugar-Free) Oral PRN    acetaminophen   Tablet .. Oral PRN  escitalopram Oral  riluzole Oral      enoxaparin Injectable SubCutaneous    glycopyrrolate Oral  glycopyrrolate Oral  linaclotide Oral  pantoprazole  Injectable IV Push  polyethylene glycol 3350 Oral  senna Oral      dextrose 5% + sodium chloride 0.9%. IV Continuous      chlorhexidine 4% Liquid Topical                            13.0   9.99  )-----------( 247      ( 19 Oct 2020 05:25 )             36.1       10-19    136  |  97  |  10  ----------------------------<  128<H>  3.1<L>   |  35<H>  |  0.29<L>    Ca    9.8      19 Oct 2020 05:25  Phos  2.9     10-19  Mg     1.6     10-19    TPro  8.7<H>  /  Alb  2.7<L>  /  TBili  0.6  /  DBili  x   /  AST  18  /  ALT  36  /  AlkPhos  130<H>  10-18      CARDIAC MARKERS ( 18 Oct 2020 08:01 )  .034 ng/mL / x     / 71 U/L / x     / 17.2 ng/mL  CARDIAC MARKERS ( 18 Oct 2020 00:48 )  .022 ng/mL / x     / 75 U/L / x     / 19.1 ng/mL      PT/INR - ( 18 Oct 2020 08:01 )   PT: 12.5 sec;   INR: 1.07 ratio         PTT - ( 18 Oct 2020 08:01 )  PTT:42.9 sec  Urinalysis Basic - ( 18 Oct 2020 09:19 )    Color: Pale Yellow / Appearance: Slightly Turbid / S.010 / pH: x  Gluc: x / Ketone: Negative  / Bili: Negative / Urobili: Negative   Blood: x / Protein: Negative / Nitrite: Negative   Leuk Esterase: Trace / RBC: x / WBC 3-5   Sq Epi: x / Non Sq Epi: Few / Bacteria: Occasional      .Blood Blood-Peripheral   No growth to date. -- 10-18 @ 04:39        CENTRAL LINE: N   COHEN: N  A-LINE: N      GLOBAL ISSUE/BEST PRACTICE  Analgesia: Y  Sedation: N  HOB elevation: yes  Stress ulcer prophylaxis: y  VTE prophylaxis: y  Glycemic control: n  Nutrition: npo    CODE STATUS: DNR/DNI

## 2020-10-19 NOTE — PROGRESS NOTE ADULT - PROBLEM SELECTOR PLAN 6
-pt, pt's family, ICU team discussed GOC and decided pt to be DNR/DNI, no trach.   -interest in having further equipment / help at home   -pt already has BiPAP at home which she will continue to use  -pt has suction to help manage secretions at home ; wishes to have female external catheter to help manage incontinence which they can hook up to suction they have at home  -SW/CM consult to help with obtaining some home equipment if feasible  -palliative care (Dr. Pranav Edouard) consulted

## 2020-10-19 NOTE — PROGRESS NOTE ADULT - PROBLEM SELECTOR PLAN 1
Pt presented with acute shortness of breath, O2 sat 88% on RA upon presentation to the ED (desaturated to low 80s on 4L O2 NC), admitted to ICU and required BiPAP intermittently.  - suspect due to oral secretions and progression/exacerbation of ALS   - D-dimer normal. Pro-BNP low. cardiac enzymes negative; no sign of PNA on CXR, no leukocytosis or fever  - has improved and now tolerating NC O2   - now DNR/DNI, does not wish to have trach  - Pulmonology (Dr. Choi) consulted, recs appreciated  - Palliative care consulted (Dr. Pranav Edouard), recs appreciated  - c/w oral care to manage secretions  - BiPAP PRN

## 2020-10-19 NOTE — PROGRESS NOTE ADULT - PROBLEM SELECTOR PLAN 3
patient with hypothermia - likely autonomic dysfunction  Pt presented with 3/4 SIRS criteria, with unknown source   - does not appear to have active infection. blood cultures negative  - Likely due to worsening ALS/autonomic dysfunction  - hypothermia resolved

## 2020-10-19 NOTE — PROGRESS NOTE ADULT - ASSESSMENT
68 y/o F with PMHx of HTN, advanced ALS, s/p PEG tube on tube feedings, quadriplegic, presented to the ED with a chief complaint of dyspnea a/w acute hypoxic and hypercapnic respiratory failure likely in the setting of progressive ALS.

## 2020-10-19 NOTE — PROGRESS NOTE ADULT - PROBLEM SELECTOR PLAN 1
off bipap  feeds resumed, monitor tolerance  correct electrolytes prn  asp prec, elevate hob  routine peg care

## 2020-10-19 NOTE — PROGRESS NOTE ADULT - ASSESSMENT
68 yo f pmhx End Stage ALS with recent PEG placement (second one), and HTN admitted with hypoxic/hypercapnic respiratory failure likely 2/2 advanced ALS.      NEURO: Advanced ALS, home medications ordered with some brought in by son, supportive care.  Message left overnight with patient's neurologist Dr. Membreno's answering service. Tylenol PRN for mild pain.  CV: HD stable currently. C/w home amlodipine with hold parameters.  RESP: Hypoxic respiratory failure in setting of advancing ALS, abg revealing hypercapnia, patient placed on bipap, actively titrating fio2 for spo2 >88%, weaning as tolerated.  Keep HOB >45, aspiration precautions.  Patient at extremely high risk for respiratory decompensation requiring intubation.  RENAL: Renal indices stable.   GI: NPO. Continue to hold feeds as patient is high risk for aspiration. Can continue meds through PEG tube. Severe constipation, lactulose retention enema given, ordered miralax and senna.    ENDO: POCT q6hrs while NPO   ID: Pt hypothermic upon admission but has since resolved. No leukocytosis. CXR negative for active disease. Respiratory failure seems more likely due to advanced ALS than active infection. F/u blood, urine, and sputum cultures.  HEME: Lovenox for VTE ppx   DISPO: Full code.  GOC discussion started by outpatient Neurologist Dr. Membreno however no decision has been made yet. Will discuss GOC with patient's son (HCP) Lambert Oglesby and patient. Palliative care consulted.    70 yo f pmhx End Stage ALS with recent PEG placement (second one), and HTN admitted with hypoxic/hypercapnic respiratory failure likely 2/2 advanced ALS.      #NEURO  -Advanced ALS, home medications ordered with some brought in by son, supportive care.    -Tylenol PRN for mild pain.    #CV:  - HD stable currently  - C/w home amlodipine with hold parameters.    #RESP:   - Acute Hypoxic/Hypercapnic respiratory failure in setting of advancing ALS,   - liberated from bipap, now on NC and sating well  - Keep HOB >45, aspiration precautions.        #RENAL:   - Renal indices stable.   - hypokalemic today, will replete    #GI:   - NPO with tube feeds   - Severe constipation, continue miralax and senna.      #ENDO:   - POCT q6hrs    #ID:   - Pt hypothermic upon admission but has since resolved.   - No leukocytosis. CXR negative for active disease. Respiratory failure seems more likely due to advanced ALS than active infection.   - F/u blood, urine, and sputum cultures.    #HEME:  - Lovenox for VTE ppx     #DISPO: deescalate care to remote tele

## 2020-10-19 NOTE — PROGRESS NOTE ADULT - PROBLEM SELECTOR PLAN 1
ALS - resp failure - exac of ALS  may need cough assist device  chest PT  suction PRN  NIPPV as tolerated -   LE dopplers NEG  CXR neg  HOB elev  asp prec  pt has a PEG - Nutrition  caution with sedatives that affect resp drive - eg. Opioids and Benzo,  prognosis poor - appears to have end stage ALS - DNR   appropriate for HOME hospice

## 2020-10-20 DIAGNOSIS — A41.9 SEPSIS, UNSPECIFIED ORGANISM: ICD-10-CM

## 2020-10-20 DIAGNOSIS — Z71.89 OTHER SPECIFIED COUNSELING: ICD-10-CM

## 2020-10-20 LAB
ALBUMIN SERPL ELPH-MCNC: 2.4 G/DL — LOW (ref 3.3–5)
ALP SERPL-CCNC: 124 U/L — HIGH (ref 40–120)
ALT FLD-CCNC: 33 U/L — SIGNIFICANT CHANGE UP (ref 12–78)
ANION GAP SERPL CALC-SCNC: 3 MMOL/L — LOW (ref 5–17)
AST SERPL-CCNC: 23 U/L — SIGNIFICANT CHANGE UP (ref 15–37)
BASE EXCESS BLDA CALC-SCNC: 9.9 MMOL/L — HIGH (ref -2–2)
BASOPHILS # BLD AUTO: 0.04 K/UL — SIGNIFICANT CHANGE UP (ref 0–0.2)
BASOPHILS NFR BLD AUTO: 0.2 % — SIGNIFICANT CHANGE UP (ref 0–2)
BILIRUB SERPL-MCNC: 0.5 MG/DL — SIGNIFICANT CHANGE UP (ref 0.2–1.2)
BLOOD GAS COMMENTS ARTERIAL: SIGNIFICANT CHANGE UP
BLOOD GAS COMMENTS ARTERIAL: SIGNIFICANT CHANGE UP
BUN SERPL-MCNC: 13 MG/DL — SIGNIFICANT CHANGE UP (ref 7–23)
CALCIUM SERPL-MCNC: 9.5 MG/DL — SIGNIFICANT CHANGE UP (ref 8.5–10.1)
CHLORIDE SERPL-SCNC: 100 MMOL/L — SIGNIFICANT CHANGE UP (ref 96–108)
CO2 SERPL-SCNC: 37 MMOL/L — HIGH (ref 22–31)
CREAT SERPL-MCNC: 0.34 MG/DL — LOW (ref 0.5–1.3)
CULTURE RESULTS: SIGNIFICANT CHANGE UP
EOSINOPHIL # BLD AUTO: 0 K/UL — SIGNIFICANT CHANGE UP (ref 0–0.5)
EOSINOPHIL NFR BLD AUTO: 0 % — SIGNIFICANT CHANGE UP (ref 0–6)
GLUCOSE SERPL-MCNC: 178 MG/DL — HIGH (ref 70–99)
HCO3 BLDA-SCNC: 32 MMOL/L — HIGH (ref 23–27)
HCT VFR BLD CALC: 34.6 % — SIGNIFICANT CHANGE UP (ref 34.5–45)
HGB BLD-MCNC: 12.4 G/DL — SIGNIFICANT CHANGE UP (ref 11.5–15.5)
HOROWITZ INDEX BLDA+IHG-RTO: 40 — SIGNIFICANT CHANGE UP
HOROWITZ INDEX BLDA+IHG-RTO: SIGNIFICANT CHANGE UP
IMM GRANULOCYTES NFR BLD AUTO: 0.8 % — SIGNIFICANT CHANGE UP (ref 0–1.5)
LYMPHOCYTES # BLD AUTO: 0.5 K/UL — LOW (ref 1–3.3)
LYMPHOCYTES # BLD AUTO: 2.3 % — LOW (ref 13–44)
MAGNESIUM SERPL-MCNC: 1.7 MG/DL — SIGNIFICANT CHANGE UP (ref 1.6–2.6)
MCHC RBC-ENTMCNC: 34.8 PG — HIGH (ref 27–34)
MCHC RBC-ENTMCNC: 35.8 GM/DL — SIGNIFICANT CHANGE UP (ref 32–36)
MCV RBC AUTO: 97.2 FL — SIGNIFICANT CHANGE UP (ref 80–100)
MONOCYTES # BLD AUTO: 1.11 K/UL — HIGH (ref 0–0.9)
MONOCYTES NFR BLD AUTO: 5.2 % — SIGNIFICANT CHANGE UP (ref 2–14)
NEUTROPHILS # BLD AUTO: 19.67 K/UL — HIGH (ref 1.8–7.4)
NEUTROPHILS NFR BLD AUTO: 91.5 % — HIGH (ref 43–77)
NRBC # BLD: 0 /100 WBCS — SIGNIFICANT CHANGE UP (ref 0–0)
PCO2 BLDA: 63 MMHG — HIGH (ref 32–46)
PCO2 BLDA: >103 MMHG — SIGNIFICANT CHANGE UP (ref 32–46)
PH BLDA: 7.05 — CRITICAL LOW (ref 7.35–7.45)
PH BLDA: 7.37 — SIGNIFICANT CHANGE UP (ref 7.35–7.45)
PHOSPHATE SERPL-MCNC: 1.3 MG/DL — LOW (ref 2.5–4.5)
PLATELET # BLD AUTO: 216 K/UL — SIGNIFICANT CHANGE UP (ref 150–400)
PO2 BLDA: 45 MMHG — CRITICAL LOW (ref 74–108)
PO2 BLDA: 58 MMHG — LOW (ref 74–108)
POTASSIUM SERPL-MCNC: 3.8 MMOL/L — SIGNIFICANT CHANGE UP (ref 3.5–5.3)
POTASSIUM SERPL-SCNC: 3.8 MMOL/L — SIGNIFICANT CHANGE UP (ref 3.5–5.3)
PROT SERPL-MCNC: 8 G/DL — SIGNIFICANT CHANGE UP (ref 6–8.3)
RBC # BLD: 3.56 M/UL — LOW (ref 3.8–5.2)
RBC # FLD: 12.5 % — SIGNIFICANT CHANGE UP (ref 10.3–14.5)
SAO2 % BLDA: 61 % — LOW (ref 92–96)
SAO2 % BLDA: 89 % — LOW (ref 92–96)
SODIUM SERPL-SCNC: 140 MMOL/L — SIGNIFICANT CHANGE UP (ref 135–145)
SPECIMEN SOURCE: SIGNIFICANT CHANGE UP
WBC # BLD: 21.5 K/UL — HIGH (ref 3.8–10.5)
WBC # FLD AUTO: 21.5 K/UL — HIGH (ref 3.8–10.5)

## 2020-10-20 PROCEDURE — 71045 X-RAY EXAM CHEST 1 VIEW: CPT | Mod: 26

## 2020-10-20 PROCEDURE — 99497 ADVNCD CARE PLAN 30 MIN: CPT | Mod: 25

## 2020-10-20 PROCEDURE — 99222 1ST HOSP IP/OBS MODERATE 55: CPT

## 2020-10-20 PROCEDURE — 99233 SBSQ HOSP IP/OBS HIGH 50: CPT

## 2020-10-20 RX ORDER — SODIUM CHLORIDE 9 MG/ML
1000 INJECTION INTRAMUSCULAR; INTRAVENOUS; SUBCUTANEOUS
Refills: 0 | Status: DISCONTINUED | OUTPATIENT
Start: 2020-10-20 | End: 2020-10-21

## 2020-10-20 RX ORDER — MAGNESIUM SULFATE 500 MG/ML
2 VIAL (ML) INJECTION ONCE
Refills: 0 | Status: COMPLETED | OUTPATIENT
Start: 2020-10-20 | End: 2020-10-20

## 2020-10-20 RX ORDER — SODIUM CHLORIDE 9 MG/ML
500 INJECTION INTRAMUSCULAR; INTRAVENOUS; SUBCUTANEOUS ONCE
Refills: 0 | Status: COMPLETED | OUTPATIENT
Start: 2020-10-20 | End: 2020-10-20

## 2020-10-20 RX ORDER — PIPERACILLIN AND TAZOBACTAM 4; .5 G/20ML; G/20ML
3.38 INJECTION, POWDER, LYOPHILIZED, FOR SOLUTION INTRAVENOUS EVERY 8 HOURS
Refills: 0 | Status: DISCONTINUED | OUTPATIENT
Start: 2020-10-20 | End: 2020-10-21

## 2020-10-20 RX ORDER — PIPERACILLIN AND TAZOBACTAM 4; .5 G/20ML; G/20ML
3.38 INJECTION, POWDER, LYOPHILIZED, FOR SOLUTION INTRAVENOUS ONCE
Refills: 0 | Status: COMPLETED | OUTPATIENT
Start: 2020-10-20 | End: 2020-10-20

## 2020-10-20 RX ORDER — POTASSIUM PHOSPHATE, MONOBASIC POTASSIUM PHOSPHATE, DIBASIC 236; 224 MG/ML; MG/ML
30 INJECTION, SOLUTION INTRAVENOUS ONCE
Refills: 0 | Status: COMPLETED | OUTPATIENT
Start: 2020-10-20 | End: 2020-10-20

## 2020-10-20 RX ADMIN — POLYETHYLENE GLYCOL 3350 17 GRAM(S): 17 POWDER, FOR SOLUTION ORAL at 17:02

## 2020-10-20 RX ADMIN — SODIUM CHLORIDE 2000 MILLILITER(S): 9 INJECTION INTRAMUSCULAR; INTRAVENOUS; SUBCUTANEOUS at 15:57

## 2020-10-20 RX ADMIN — POLYETHYLENE GLYCOL 3350 17 GRAM(S): 17 POWDER, FOR SOLUTION ORAL at 05:27

## 2020-10-20 RX ADMIN — ESCITALOPRAM OXALATE 20 MILLIGRAM(S): 10 TABLET, FILM COATED ORAL at 12:05

## 2020-10-20 RX ADMIN — Medication 50 GRAM(S): at 10:56

## 2020-10-20 RX ADMIN — ROBINUL 2 MILLIGRAM(S): 0.2 INJECTION INTRAMUSCULAR; INTRAVENOUS at 12:05

## 2020-10-20 RX ADMIN — SODIUM CHLORIDE 30 MILLILITER(S): 9 INJECTION, SOLUTION INTRAVENOUS at 10:57

## 2020-10-20 RX ADMIN — POTASSIUM PHOSPHATE, MONOBASIC POTASSIUM PHOSPHATE, DIBASIC 83.33 MILLIMOLE(S): 236; 224 INJECTION, SOLUTION INTRAVENOUS at 12:06

## 2020-10-20 RX ADMIN — PIPERACILLIN AND TAZOBACTAM 200 GRAM(S): 4; .5 INJECTION, POWDER, LYOPHILIZED, FOR SOLUTION INTRAVENOUS at 15:01

## 2020-10-20 RX ADMIN — Medication 650 MILLIGRAM(S): at 12:05

## 2020-10-20 RX ADMIN — Medication 650 MILLIGRAM(S): at 13:01

## 2020-10-20 RX ADMIN — ENOXAPARIN SODIUM 40 MILLIGRAM(S): 100 INJECTION SUBCUTANEOUS at 11:49

## 2020-10-20 RX ADMIN — PANTOPRAZOLE SODIUM 40 MILLIGRAM(S): 20 TABLET, DELAYED RELEASE ORAL at 12:05

## 2020-10-20 RX ADMIN — ROBINUL 1 MILLIGRAM(S): 0.2 INJECTION INTRAMUSCULAR; INTRAVENOUS at 21:40

## 2020-10-20 RX ADMIN — SENNA PLUS 2 TABLET(S): 8.6 TABLET ORAL at 21:40

## 2020-10-20 RX ADMIN — SODIUM CHLORIDE 1000 MILLILITER(S): 9 INJECTION INTRAMUSCULAR; INTRAVENOUS; SUBCUTANEOUS at 17:02

## 2020-10-20 RX ADMIN — PIPERACILLIN AND TAZOBACTAM 25 GRAM(S): 4; .5 INJECTION, POWDER, LYOPHILIZED, FOR SOLUTION INTRAVENOUS at 21:41

## 2020-10-20 RX ADMIN — RILUZOLE 50 MILLIGRAM(S): 50 TABLET ORAL at 17:02

## 2020-10-20 RX ADMIN — SODIUM CHLORIDE 75 MILLILITER(S): 9 INJECTION INTRAMUSCULAR; INTRAVENOUS; SUBCUTANEOUS at 21:39

## 2020-10-20 RX ADMIN — AMLODIPINE BESYLATE 5 MILLIGRAM(S): 2.5 TABLET ORAL at 05:27

## 2020-10-20 NOTE — PROGRESS NOTE ADULT - PROBLEM SELECTOR PLAN 1
cont feeds via peg as tolerated  correct electrolytes prn  strict asp prec, elevate hob  routine peg care  goc noted cont feeds via peg as tolerated  correct electrolytes prn  strict asp prec, elevate hob  monitor residuals; routine peg care  uptrending wbc, cxr pending  goc noted, dnr/dni

## 2020-10-20 NOTE — PROGRESS NOTE ADULT - PROBLEM SELECTOR PLAN 4
-pt, pt's family, ICU team discussed GOC and decided pt to be DNR/DNI, no trach.   -I had further discussion with pt's son today given pt decompensated further. Spoke with pt's son in detail about pt's progressive decline, likely aspiration PNA, and increasing frequency of need for AVAPS and recurrent hypercarbic respiratory failure events with severe acidosis which can precipitate cardiac arrest. It is possible that pt is entering stage of her ALS where her diaphragm cannot support her ventilation of CO2 and she is becoming more NIPPV dependent. Pt's son is understanding of the progressive nature of the pt's disease and that she has become significantly more unstable recently. He wishes for his father to come in, as well, who will be in tomorrow. Will discuss further the wishes regarding prolonged NIPPV and possibility of hospice / comfort measures tomorrow. Consulted Dr. Pranav Edouard (palliative care attending) to further assist with this discussion.   -Spent 25min on the advanced care planning / GOC discussion with pt's son separate from rest of medical care provided.    -if pt stabilizes pt's family has interest in having further equipment / help at home   -pt already has BiPAP at home which she will continue to use  -pt has suction to help manage secretions at home ; wishes to have female external catheter to help manage incontinence which they can hook up to suction they have at home  -SW/CM consult to help with obtaining some home equipment if feasible

## 2020-10-20 NOTE — PROGRESS NOTE ADULT - PROBLEM SELECTOR PLAN 1
Pt presented with acute shortness of breath, O2 sat 88% on RA upon presentation to the ED (desaturated to low 80s on 4L O2 NC), admitted to ICU and required BiPAP intermittently.  - suspect due to oral secretions and progression/exacerbation of ALS   - D-dimer normal. Pro-BNP low. cardiac enzymes negative; no sign of PNA on admission CXR, no leukocytosis or fever on admission  - had improved and was tolerating NC O2 so transferred to telemetry last night  - now today had recurrent episode of acute hypoxic and hypercapnic respiratory failure now likely in setting of aspiration PNA, as well  -ABG showed: pH: 7.05; pCO2: >103; pO2: 45. CXR performed and showing LLL consolidation. Started on Zosyn. BP down to 66/41. Given 1L NS bolus with improvement to 101/67. ABG repeated as pt was becoming more alert and was much improved to pH: 7.37 ; pCO2: 63; pO2: 58; SpO2: 89%. Discussed with ICU attending, given pt will not have care escalated past AVAPS, will continue care on telemetry and have further GOC discussion with pt's family.  - Pulmonology (Dr. Choi) consulted, recs appreciated  - Palliative care consulted (Dr. Pranav Edouard) reconsulted.   - c/w oral care to manage secretions  - will give break off AVAPS for couple of hours and then keep it on overnight. In the morning, consider taking off and restarting tube feeds if pt more stable.

## 2020-10-20 NOTE — CONSULT NOTE ADULT - SUBJECTIVE AND OBJECTIVE BOX
[facility]  INFECTIOUS DISEASES   [address] [place] [address]  Tel: [phone]     Fax: [phone]  =======================================================    MRN-[id number]  GELY CUENCA     CC: acute hypoxic respiratory failure     HPI:  70 y/o woman with PMH of ALS, HTN, s/p PEG tube on tube feedings was admitted with dyspnea. She is strictly NPO and just recently had PEG placed in [facility]. As per chart she was doing well until Thursday 10/15, she had decreased food intake although he states he has been hydrating her through the PEG tube. She has been constipated, with minimal bowel movements last had a small BM on 10/17. She has been told by her ALS specialist regarding goals of care and possible tracheostomy placement as opposed to a palliative route. She has yet to make up her mind and has not shared her wishes with her son/HCP Lambert. She was on bollus fee 4times/day but they just received the kangaroo pump to change her from bolus to continuous feeds.     PAST MEDICAL & SURGICAL HISTORY:  HTN (hypertension)  ALS (amyotrophic lateral sclerosis)  S/P shoulder replacement, left  S/P percutaneous endoscopic gastrostomy (PEG) tube placement  S/P brain surgery  for neuroma    Social Hx: no smoking, or other toxic habits     FAMILY HISTORY:  FHx: leukemia    FHx: breast cancer  in mother    Allergies  Macrobid (Unknown)  Zithromax (Unknown)    Intolerances  sulfa drugs (Other)    Antibiotics:  None     REVIEW OF SYSTEMS:  nonverbal    Physical Exam:  Vital Signs Last 24 Hrs  T(C): 36.8 (20 Oct 2020 11:47), Max: 36.8 (20 Oct 2020 11:47)  T(F): 98.2 (20 Oct 2020 11:47), Max: 98.2 (20 Oct 2020 11:47)  HR: 107 (20 Oct 2020 11:47) (55 - 110)  BP: 163/88 (20 Oct 2020 11:47) (120/65 - 163/88)  BP(mean): 96 (19 Oct 2020 17:00) (87 - 98)  RR: 19 (20 Oct 2020 11:47) (19 - 31)  SpO2: 94% (20 Oct 2020 11:47) (92% - 101%)  GEN: NAD  HEENT: normocephalic and atraumatic. EOMI. PERRL.    NECK: Supple.  No lymphadenopathy   LUNGS: Coarse rhonchi bilaterally   HEART: Regular rate and rhythm   ABDOMEN: Soft, nontender, and nondistended.  Positive bowel sounds.    : No CVA tenderness, farrell+   EXTREMITIES: no edema   NEUROLOGIC: contracted extremities and functional quadriplegia  PSYCHIATRIC: Appropriate affect .  SKIN: No ulceration or induration present.    Labs:  10-20    140  |  100  |  13  ----------------------------<  178<H>  3.8   |  37<H>  |  0.34<L>    Ca    9.5      20 Oct 2020 06:49  Phos  1.3     10-20  Mg     1.7     10-20    TPro  8.0  /  Alb  2.4<L>  /  TBili  0.5  /  DBili  x   /  AST  23  /  ALT  33  /  AlkPhos  124<H>  10-20                        12.4   21.50 )-----------( 216      ( 20 Oct 2020 06:49 )             34.6     LIVER FUNCTIONS - ( 20 Oct 2020 06:49 )  Alb: 2.4 g/dL / Pro: 8.0 g/dL / ALK PHOS: 124 U/L / ALT: 33 U/L / AST: 23 U/L / GGT: x               ABG - ( 20 Oct 2020 13:16 )  pH, Arterial: 7.05  pH, Blood: x     /  pCO2: >103. /  pO2: 45    / HCO3: x     / Base Excess: x     /  SaO2: 61        Procalcitonin, Serum: 0.06 ng/mL (10-18-20 @ 00:48)    COVID-19 PCR: NotDetec (10-18-20 @ 00:48)    RECENT CULTURES:  10-18 @ 13:58 .Urine Clean Catch (Midstream)     50,000 - 99,000 CFU/mL Streptococcus agalactiae (Group B) isolated  Group B streptococci are susceptible to ampicillin,  penicillin and cefazolin, but may be resistant to  erythromycin and clindamycin.  Recommendations for intrapartum prophylaxis for Group B  streptococci are penicillin or ampicillin.    10-18 @ 04:39 .Blood Blood-Peripheral     No growth to date.    All imaging and other data have been reviewed.  < from: Xray Chest 1 View-PORTABLE IMMEDIATE (10.18.20 @ 00:43) >  EXAM:  XR CHEST PORTABLE IMMED 1V                        PROCEDURE DATE:  10/18/2020    INTERPRETATION:  HISTORY: Shortness of breath, fever, cough  TECHNIQUE: Single frontal view of the chest with comparison to 6/14/2019  FINDINGS:  The heart is normal in size. Lungs are grossly clear. The apices and hemidiaphragms are unremarkable. Degenerative changes. Prior left humeral head replacement.  IMPRESSION:  No acute disease.  No significant change as compared to 6/14/2019    Assessment and Plan:   70 y/o woman with PMH of ALS, HTN, s/p PEG tube on tube feedings was admitted with dyspnea. Has PEG, aspiration is a possibility, increase in WBC and abnormal ABG.     Aspiration pneumonia, ALS,PEG  - Blood culture NGTD on 10/18  - UA negative and UC most likely contamination   - CXR initially no opacities, but CT not done  - Repeat CXR result from today pending, looks like increased Opacities in bases RL>LL, will check official report   - Will start Zosyn to cover Aspiration and possible HCAP since was in the hospital recently     Thank you for courtesy of this consult.     Will follow.    Freeman Mead MD  Division of Infectious Diseases   Cell [phone] between 8am and 6pm   After 6pm and weekends please call ID service at [phone].

## 2020-10-20 NOTE — PROGRESS NOTE ADULT - PROBLEM SELECTOR PLAN 3
-now with leukocytosis to 21, tachypnea, tachycardia likely sepsis due to aspiration PNA. CXR now showing LLL consolidation.   -pt is extremely high risk for aspiration  -ID ([place]), recs appreciated  -started on zosyn  -given 1L NS bolus  -BP stabilizing  -monitor  -c/w GOC discussion with family.

## 2020-10-20 NOTE — PROGRESS NOTE ADULT - SUBJECTIVE AND OBJECTIVE BOX
Patient is a 69y old  Female who presents with a chief complaint of SOB.      INTERVAL HPI/OVERNIGHT EVENTS: Pt is nonverbal and cannot provide ROS. Pt was found to have labored, shallow breathing ~noon today and was less alert than yesterday. Placed on AVAPS and checked ABG. ABG showed: pH: 7.05; pCO2: >103; pO2: 45. CXR performed and showing LLL consolidation. Started on Zosyn. BP down to 66/41. Given 1L NS bolus with improvement to 101/67. ABG repeated as pt was becoming more alert and was much improved to pH: 7.37 ; pCO2: 63; pO2: 58; SpO2: 89%. Discussed with ICU attending, given pt will not have care escalated past AVAPS, will continue care on telemetry and have further GOC discussion with pt's family. Spoke with pt's son in detail about pt's progressive decline, likely aspiration PNA, and increasing frequency of need for AVAPS and recurrent hypercarbic respiratory failure events with severe acidosis which can precipitate cardiac arrest. It is possible that pt is entering stage of her ALS where her diaphragm cannot support her ventilation of CO2 and she is becoming more NIPPV dependent. Pt's son is understanding of the progressive nature of the pt's disease and that she has become significantly more unstable recently. He wishes for his father to come in, as well, who will be in tomorrow. Will discuss further the wishes regarding prolonged NIPPV and possibility of hospice / comfort measures tomorrow. Consulted Dr. Pranav Edouard (palliative care attending) to further assist with this discussion.     MEDICATIONS  (STANDING):  amLODIPine   Tablet 5 milliGRAM(s) Oral daily  dextrose 5% + sodium chloride 0.9%. 1000 milliLiter(s) (30 mL/Hr) IV Continuous <Continuous>  enoxaparin Injectable 40 milliGRAM(s) SubCutaneous daily  escitalopram 20 milliGRAM(s) Oral daily  glycopyrrolate 1 milliGRAM(s) Oral at bedtime  glycopyrrolate 2 milliGRAM(s) Oral daily  linaclotide 290 MICROGram(s) Oral <User Schedule>  pantoprazole  Injectable 40 milliGRAM(s) IV Push daily  piperacillin/tazobactam IVPB.. 3.375 Gram(s) IV Intermittent every 8 hours  polyethylene glycol 3350 17 Gram(s) Oral two times a day  riluzole 50 milliGRAM(s) Oral two times a day  senna 2 Tablet(s) Oral at bedtime    MEDICATIONS  (PRN):  acetaminophen   Tablet .. 650 milliGRAM(s) Oral every 6 hours PRN Mild Pain (1 - 3)  guaiFENesin   Syrup  (Sugar-Free) 200 milliGRAM(s) Oral every 6 hours PRN Cough      Allergies    Macrobid (Unknown)  Zithromax (Unknown)    Intolerances    sulfa drugs (Other)      REVIEW OF SYSTEMS:  Pt is nonverbal and cannot provide ROS. Also was more lethargic while in respiratory acidosis today.    Vital Signs Last 24 Hrs  T(C): 36.5 (20 Oct 2020 16:58), Max: 36.8 (20 Oct 2020 11:47)  T(F): 97.7 (20 Oct 2020 16:58), Max: 98.2 (20 Oct 2020 11:47)  HR: 118 (20 Oct 2020 20:11) (55 - 118)  BP: 101/67 (20 Oct 2020 17:50) (66/41 - 163/88)  BP(mean): --  RR: 18 (20 Oct 2020 16:58) (18 - 26)  SpO2: 91% (20 Oct 2020 16:58) (85% - 101%)    PHYSICAL EXAM: on exam ~noon today  GENERAL: pt in acute respiratory distress, somnolent  HEENT:  anicteric, +oral secretions  CHEST/LUNG:  decreased breath sounds, poor inspiration, rapid shallow breathing prior to placement of AVAPS   HEART:  tachycardic, S1, S2  ABDOMEN:  BS+, soft, nondistended; PEG in place  EXTREMITIES: no edema, cyanosis.   NERVOUS SYSTEM: somnolent, nonverbal, nearly quadriplegic with some minimal movement in arms    LABS:                        12.4   21.50 )-----------( 216      ( 20 Oct 2020 06:49 )             34.6     CBC Full  -  ( 20 Oct 2020 06:49 )  WBC Count : 21.50 K/uL  Hemoglobin : 12.4 g/dL  Hematocrit : 34.6 %  Platelet Count - Automated : 216 K/uL  Mean Cell Volume : 97.2 fl  Mean Cell Hemoglobin : 34.8 pg  Mean Cell Hemoglobin Concentration : 35.8 gm/dL  Auto Neutrophil # : 19.67 K/uL  Auto Lymphocyte # : 0.50 K/uL  Auto Monocyte # : 1.11 K/uL  Auto Eosinophil # : 0.00 K/uL  Auto Basophil # : 0.04 K/uL  Auto Neutrophil % : 91.5 %  Auto Lymphocyte % : 2.3 %  Auto Monocyte % : 5.2 %  Auto Eosinophil % : 0.0 %  Auto Basophil % : 0.2 %    20 Oct 2020 06:49    140    |  100    |  13     ----------------------------<  178    3.8     |  37     |  0.34     Ca    9.5        20 Oct 2020 06:49  Phos  1.3       20 Oct 2020 06:49  Mg     1.7       20 Oct 2020 06:49    TPro  8.0    /  Alb  2.4    /  TBili  0.5    /  DBili  x      /  AST  23     /  ALT  33     /  AlkPhos  124    20 Oct 2020 06:49        CAPILLARY BLOOD GLUCOSE      POCT Blood Glucose.: 122 mg/dL (20 Oct 2020 18:07)  POCT Blood Glucose.: 170 mg/dL (20 Oct 2020 12:32)  POCT Blood Glucose.: 169 mg/dL (20 Oct 2020 04:52)  POCT Blood Glucose.: 150 mg/dL (19 Oct 2020 22:11)        Culture - Urine (collected 10-18-20 @ 13:58)  Source: .Urine Clean Catch (Midstream)  Final Report (10-20-20 @ 10:42):    50,000 - 99,000 CFU/mL Streptococcus agalactiae (Group B) isolated    Group B streptococci are susceptible to ampicillin,    penicillin and cefazolin, but may be resistant to    erythromycin and clindamycin.    Recommendations for intrapartum prophylaxis for Group B    streptococci are penicillin or ampicillin.    Culture - Blood (collected 10-18-20 @ 04:39)  Source: .Blood Blood-Peripheral  Preliminary Report (10-19-20 @ 05:00):    No growth to date.    Culture - Blood (collected 10-18-20 @ 04:39)  Source: .Blood Blood-Peripheral  Preliminary Report (10-19-20 @ 05:00):    No growth to date.        RADIOLOGY & ADDITIONAL TESTS:    Personally reviewed.     Consultant(s) Notes Reviewed:  [x] YES  [ ] NO

## 2020-10-20 NOTE — PROGRESS NOTE ADULT - PROBLEM SELECTOR PLAN 1
frail - weak - interactive - alert - s/p NIPPV use overnight - VS noted -   ALS - resp failure - exac of ALS  may need cough assist device  chest PT  suction PRN  NIPPV as tolerated -   LE dopplers NEG  CXR neg  HOB elev  asp prec  pt has a PEG - Nutrition  caution with sedatives that affect resp drive - eg. Opioids and Benzo,  prognosis poor - appears to have end stage ALS - DNR   appropriate for HOME hospice.

## 2020-10-20 NOTE — PROGRESS NOTE ADULT - ASSESSMENT
70 y/o F with PMHx of HTN, advanced ALS, s/p PEG tube on tube feedings, quadriplegic, presented to the ED with a chief complaint of dyspnea a/w acute hypoxic and hypercapnic respiratory failure likely in the setting of progressive ALS, now with sepsis and recurrent acute hypoxic and hypercapnic respiratory failure due to aspiration PNA in setting of end-stage ALS.

## 2020-10-20 NOTE — PROGRESS NOTE ADULT - SUBJECTIVE AND OBJECTIVE BOX
Date/Time Patient Seen:  		  Referring MD:   Data Reviewed	       Patient is a 69y old  Female who presents with a chief complaint of acute hypoxic respiratory failure (19 Oct 2020 13:20)      Subjective/HPI     PAST MEDICAL & SURGICAL HISTORY:  HTN (hypertension)    ALS (amyotrophic lateral sclerosis)    S/P shoulder replacement, left    S/P percutaneous endoscopic gastrostomy (PEG) tube placement    S/P brain surgery  for neuroma    No significant past surgical history          Medication list         MEDICATIONS  (STANDING):  amLODIPine   Tablet 5 milliGRAM(s) Oral daily  dextrose 5% + sodium chloride 0.9%. 1000 milliLiter(s) (30 mL/Hr) IV Continuous <Continuous>  enoxaparin Injectable 40 milliGRAM(s) SubCutaneous daily  escitalopram 20 milliGRAM(s) Oral daily  glycopyrrolate 1 milliGRAM(s) Oral at bedtime  glycopyrrolate 2 milliGRAM(s) Oral daily  linaclotide 290 MICROGram(s) Oral <User Schedule>  pantoprazole  Injectable 40 milliGRAM(s) IV Push daily  polyethylene glycol 3350 17 Gram(s) Oral two times a day  riluzole 50 milliGRAM(s) Oral two times a day  senna 2 Tablet(s) Oral at bedtime    MEDICATIONS  (PRN):  acetaminophen   Tablet .. 650 milliGRAM(s) Oral every 6 hours PRN Mild Pain (1 - 3)  guaiFENesin   Syrup  (Sugar-Free) 200 milliGRAM(s) Oral every 6 hours PRN Cough         Vitals log        ICU Vital Signs Last 24 Hrs  T(C): 36.4 (20 Oct 2020 04:49), Max: 36.8 (19 Oct 2020 07:40)  T(F): 97.6 (20 Oct 2020 04:49), Max: 98.2 (19 Oct 2020 07:40)  HR: 55 (20 Oct 2020 04:49) (55 - 108)  BP: 134/78 (20 Oct 2020 04:49) (119/64 - 143/85)  BP(mean): 96 (19 Oct 2020 17:00) (84 - 98)  ABP: --  ABP(mean): --  RR: 20 (20 Oct 2020 04:49) (20 - 55)  SpO2: 98% (20 Oct 2020 04:49) (94% - 100%)           Input and Output:  I&O's Detail    19 Oct 2020 07:01  -  20 Oct 2020 07:00  --------------------------------------------------------  IN:    dextrose 5% + sodium chloride 0.9%: 630 mL    Enteral Tube Flush: 390 mL    Jevity 1.5: 711 mL  Total IN: 1731 mL    OUT:    Voided (mL): 400 mL  Total OUT: 400 mL    Total NET: 1331 mL          Lab Data                        13.0   9.99  )-----------( 247      ( 19 Oct 2020 05:25 )             36.1     10-19    136  |  97  |  10  ----------------------------<  128<H>  3.1<L>   |  35<H>  |  0.29<L>    Ca    9.8      19 Oct 2020 05:25  Phos  2.9     10-19  Mg     1.6     10-19    TPro  8.7<H>  /  Alb  2.7<L>  /  TBili  0.6  /  DBili  x   /  AST  18  /  ALT  36  /  AlkPhos  130<H>  10-18      CARDIAC MARKERS ( 18 Oct 2020 08:01 )  .034 ng/mL / x     / 71 U/L / x     / 17.2 ng/mL        Review of Systems	      Objective     Physical Examination    heart s1s2  lung dec BS  abd soft  frail  weak      Pertinent Lab findings & Imaging      Ginger:  NO   Adequate UO     I&O's Detail    19 Oct 2020 07:01  -  20 Oct 2020 07:00  --------------------------------------------------------  IN:    dextrose 5% + sodium chloride 0.9%: 630 mL    Enteral Tube Flush: 390 mL    Jevity 1.5: 711 mL  Total IN: 1731 mL    OUT:    Voided (mL): 400 mL  Total OUT: 400 mL    Total NET: 1331 mL               Discussed with:     Cultures:	        Radiology

## 2020-10-20 NOTE — PROGRESS NOTE ADULT - SUBJECTIVE AND OBJECTIVE BOX
INTERVAL HPI/OVERNIGHT EVENTS:  pt seen and examined   transferred to floors  denies abd pain  mc feeds, had bm overnight per rn  labs noted, uptrending wbc    MEDICATIONS  (STANDING):  amLODIPine   Tablet 5 milliGRAM(s) Oral daily  chlorhexidine 2% Cloths 1 Application(s) Topical daily  chlorhexidine 4% Liquid 1 Application(s) Topical <User Schedule>  dextrose 5% + sodium chloride 0.9%. 1000 milliLiter(s) (30 mL/Hr) IV Continuous <Continuous>  enoxaparin Injectable 40 milliGRAM(s) SubCutaneous daily  escitalopram 20 milliGRAM(s) Oral daily  glycopyrrolate 1 milliGRAM(s) Oral at bedtime  glycopyrrolate 2 milliGRAM(s) Oral daily  linaclotide 290 MICROGram(s) Oral <User Schedule>  pantoprazole  Injectable 40 milliGRAM(s) IV Push daily  polyethylene glycol 3350 17 Gram(s) Oral two times a day  potassium chloride   Powder 40 milliEquivalent(s) Oral every 4 hours  riluzole 50 milliGRAM(s) Oral two times a day  senna 2 Tablet(s) Oral at bedtime    MEDICATIONS  (PRN):  acetaminophen   Tablet .. 650 milliGRAM(s) Oral every 6 hours PRN Mild Pain (1 - 3)  guaiFENesin   Syrup  (Sugar-Free) 200 milliGRAM(s) Oral every 6 hours PRN Cough      Allergies    Macrobid (Unknown)  Zithromax (Unknown)    Intolerances    sulfa drugs (Other)      Review of Systems:    unable to obtain       Vital Signs Last 24 Hrs  T(C): 36.4 (19 Oct 2020 12:00), Max: 36.9 (18 Oct 2020 23:30)  T(F): 97.5 (19 Oct 2020 12:00), Max: 98.4 (18 Oct 2020 23:30)  HR: 104 (19 Oct 2020 12:00) (95 - 124)  BP: 128/71 (19 Oct 2020 12:00) (119/64 - 171/91)  BP(mean): 92 (19 Oct 2020 12:00) (85 - 123)  RR: 26 (19 Oct 2020 12:00) (22 - 60)  SpO2: 98% (19 Oct 2020 12:00) (92% - 100%)    PHYSICAL EXAM:    Constitutional: lying in bed  HEENT: ncat  Gastrointestinal: soft nt nd +peg  Extremities: mild edema  Vascular: + peripheral pulses  Neurological: responsive    LABS:                        13.0   9.99  )-----------( 247      ( 19 Oct 2020 05:25 )             36.1     10-19    136  |  97  |  10  ----------------------------<  128<H>  3.1<L>   |  35<H>  |  0.29<L>    Ca    9.8      19 Oct 2020 05:25  Phos  2.9     10-19  Mg     1.6     10-19    TPro  8.7<H>  /  Alb  2.7<L>  /  TBili  0.6  /  DBili  x   /  AST  18  /  ALT  36  /  AlkPhos  130<H>  10    PT/INR - ( 18 Oct 2020 08:01 )   PT: 12.5 sec;   INR: 1.07 ratio         PTT - ( 18 Oct 2020 08:01 )  PTT:42.9 sec  Urinalysis Basic - ( 18 Oct 2020 09:19 )    Color: Pale Yellow / Appearance: Slightly Turbid / S.010 / pH: x  Gluc: x / Ketone: Negative  / Bili: Negative / Urobili: Negative   Blood: x / Protein: Negative / Nitrite: Negative   Leuk Esterase: Trace / RBC: x / WBC 3-5   Sq Epi: x / Non Sq Epi: Few / Bacteria: Occasional        RADIOLOGY & ADDITIONAL TESTS:

## 2020-10-21 ENCOUNTER — TRANSCRIPTION ENCOUNTER (OUTPATIENT)
Age: 70
End: 2020-10-21

## 2020-10-21 VITALS
HEART RATE: 107 BPM | SYSTOLIC BLOOD PRESSURE: 110 MMHG | RESPIRATION RATE: 18 BRPM | TEMPERATURE: 98 F | DIASTOLIC BLOOD PRESSURE: 69 MMHG | OXYGEN SATURATION: 97 %

## 2020-10-21 LAB
ALBUMIN SERPL ELPH-MCNC: 1.8 G/DL — LOW (ref 3.3–5)
ALP SERPL-CCNC: 102 U/L — SIGNIFICANT CHANGE UP (ref 40–120)
ALT FLD-CCNC: 25 U/L — SIGNIFICANT CHANGE UP (ref 12–78)
ANION GAP SERPL CALC-SCNC: 3 MMOL/L — LOW (ref 5–17)
AST SERPL-CCNC: 16 U/L — SIGNIFICANT CHANGE UP (ref 15–37)
BASOPHILS # BLD AUTO: 0.04 K/UL — SIGNIFICANT CHANGE UP (ref 0–0.2)
BASOPHILS NFR BLD AUTO: 0.2 % — SIGNIFICANT CHANGE UP (ref 0–2)
BILIRUB SERPL-MCNC: 0.7 MG/DL — SIGNIFICANT CHANGE UP (ref 0.2–1.2)
BUN SERPL-MCNC: 17 MG/DL — SIGNIFICANT CHANGE UP (ref 7–23)
CALCIUM SERPL-MCNC: 9 MG/DL — SIGNIFICANT CHANGE UP (ref 8.5–10.1)
CHLORIDE SERPL-SCNC: 104 MMOL/L — SIGNIFICANT CHANGE UP (ref 96–108)
CO2 SERPL-SCNC: 35 MMOL/L — HIGH (ref 22–31)
CREAT SERPL-MCNC: 0.32 MG/DL — LOW (ref 0.5–1.3)
EOSINOPHIL # BLD AUTO: 0 K/UL — SIGNIFICANT CHANGE UP (ref 0–0.5)
EOSINOPHIL NFR BLD AUTO: 0 % — SIGNIFICANT CHANGE UP (ref 0–6)
GLUCOSE SERPL-MCNC: 107 MG/DL — HIGH (ref 70–99)
HCT VFR BLD CALC: 31.2 % — LOW (ref 34.5–45)
HGB BLD-MCNC: 10.6 G/DL — LOW (ref 11.5–15.5)
IMM GRANULOCYTES NFR BLD AUTO: 0.5 % — SIGNIFICANT CHANGE UP (ref 0–1.5)
LYMPHOCYTES # BLD AUTO: 0.6 K/UL — LOW (ref 1–3.3)
LYMPHOCYTES # BLD AUTO: 3.2 % — LOW (ref 13–44)
MAGNESIUM SERPL-MCNC: 2 MG/DL — SIGNIFICANT CHANGE UP (ref 1.6–2.6)
MCHC RBC-ENTMCNC: 33.5 PG — SIGNIFICANT CHANGE UP (ref 27–34)
MCHC RBC-ENTMCNC: 34 GM/DL — SIGNIFICANT CHANGE UP (ref 32–36)
MCV RBC AUTO: 98.7 FL — SIGNIFICANT CHANGE UP (ref 80–100)
MONOCYTES # BLD AUTO: 0.65 K/UL — SIGNIFICANT CHANGE UP (ref 0–0.9)
MONOCYTES NFR BLD AUTO: 3.4 % — SIGNIFICANT CHANGE UP (ref 2–14)
NEUTROPHILS # BLD AUTO: 17.66 K/UL — HIGH (ref 1.8–7.4)
NEUTROPHILS NFR BLD AUTO: 92.7 % — HIGH (ref 43–77)
NRBC # BLD: 0 /100 WBCS — SIGNIFICANT CHANGE UP (ref 0–0)
PHOSPHATE SERPL-MCNC: 3.1 MG/DL — SIGNIFICANT CHANGE UP (ref 2.5–4.5)
PLATELET # BLD AUTO: 199 K/UL — SIGNIFICANT CHANGE UP (ref 150–400)
POTASSIUM SERPL-MCNC: 3.9 MMOL/L — SIGNIFICANT CHANGE UP (ref 3.5–5.3)
POTASSIUM SERPL-SCNC: 3.9 MMOL/L — SIGNIFICANT CHANGE UP (ref 3.5–5.3)
PROT SERPL-MCNC: 6.5 G/DL — SIGNIFICANT CHANGE UP (ref 6–8.3)
RBC # BLD: 3.16 M/UL — LOW (ref 3.8–5.2)
RBC # FLD: 12.7 % — SIGNIFICANT CHANGE UP (ref 10.3–14.5)
SODIUM SERPL-SCNC: 142 MMOL/L — SIGNIFICANT CHANGE UP (ref 135–145)
WBC # BLD: 19.04 K/UL — HIGH (ref 3.8–10.5)
WBC # FLD AUTO: 19.04 K/UL — HIGH (ref 3.8–10.5)

## 2020-10-21 PROCEDURE — 94668 MNPJ CHEST WALL SBSQ: CPT

## 2020-10-21 PROCEDURE — 82962 GLUCOSE BLOOD TEST: CPT

## 2020-10-21 PROCEDURE — 84484 ASSAY OF TROPONIN QUANT: CPT

## 2020-10-21 PROCEDURE — 36600 WITHDRAWAL OF ARTERIAL BLOOD: CPT

## 2020-10-21 PROCEDURE — 99223 1ST HOSP IP/OBS HIGH 75: CPT

## 2020-10-21 PROCEDURE — 81001 URINALYSIS AUTO W/SCOPE: CPT

## 2020-10-21 PROCEDURE — 99239 HOSP IP/OBS DSCHRG MGMT >30: CPT

## 2020-10-21 PROCEDURE — 36415 COLL VENOUS BLD VENIPUNCTURE: CPT

## 2020-10-21 PROCEDURE — 99285 EMERGENCY DEPT VISIT HI MDM: CPT | Mod: 25

## 2020-10-21 PROCEDURE — 80048 BASIC METABOLIC PNL TOTAL CA: CPT

## 2020-10-21 PROCEDURE — 93306 TTE W/DOPPLER COMPLETE: CPT

## 2020-10-21 PROCEDURE — 93970 EXTREMITY STUDY: CPT

## 2020-10-21 PROCEDURE — 71045 X-RAY EXAM CHEST 1 VIEW: CPT

## 2020-10-21 PROCEDURE — 94660 CPAP INITIATION&MGMT: CPT

## 2020-10-21 PROCEDURE — 83880 ASSAY OF NATRIURETIC PEPTIDE: CPT

## 2020-10-21 PROCEDURE — 82550 ASSAY OF CK (CPK): CPT

## 2020-10-21 PROCEDURE — 85730 THROMBOPLASTIN TIME PARTIAL: CPT

## 2020-10-21 PROCEDURE — 99497 ADVNCD CARE PLAN 30 MIN: CPT | Mod: 25

## 2020-10-21 PROCEDURE — 80053 COMPREHEN METABOLIC PANEL: CPT

## 2020-10-21 PROCEDURE — U0003: CPT

## 2020-10-21 PROCEDURE — 84145 PROCALCITONIN (PCT): CPT

## 2020-10-21 PROCEDURE — 85610 PROTHROMBIN TIME: CPT

## 2020-10-21 PROCEDURE — 85379 FIBRIN DEGRADATION QUANT: CPT

## 2020-10-21 PROCEDURE — 99232 SBSQ HOSP IP/OBS MODERATE 35: CPT

## 2020-10-21 PROCEDURE — 86769 SARS-COV-2 COVID-19 ANTIBODY: CPT

## 2020-10-21 PROCEDURE — 83605 ASSAY OF LACTIC ACID: CPT

## 2020-10-21 PROCEDURE — 83735 ASSAY OF MAGNESIUM: CPT

## 2020-10-21 PROCEDURE — 84100 ASSAY OF PHOSPHORUS: CPT

## 2020-10-21 PROCEDURE — 82553 CREATINE MB FRACTION: CPT

## 2020-10-21 PROCEDURE — 93005 ELECTROCARDIOGRAM TRACING: CPT

## 2020-10-21 PROCEDURE — 87086 URINE CULTURE/COLONY COUNT: CPT

## 2020-10-21 PROCEDURE — 82803 BLOOD GASES ANY COMBINATION: CPT

## 2020-10-21 PROCEDURE — 85025 COMPLETE CBC W/AUTO DIFF WBC: CPT

## 2020-10-21 PROCEDURE — 87040 BLOOD CULTURE FOR BACTERIA: CPT

## 2020-10-21 RX ORDER — ACETAMINOPHEN 500 MG
2 TABLET ORAL
Qty: 0 | Refills: 0 | DISCHARGE
Start: 2020-10-21

## 2020-10-21 RX ORDER — AMLODIPINE BESYLATE 2.5 MG/1
1 TABLET ORAL
Qty: 0 | Refills: 0 | DISCHARGE

## 2020-10-21 RX ORDER — SODIUM CHLORIDE 9 MG/ML
500 INJECTION INTRAMUSCULAR; INTRAVENOUS; SUBCUTANEOUS ONCE
Refills: 0 | Status: COMPLETED | OUTPATIENT
Start: 2020-10-21 | End: 2020-10-21

## 2020-10-21 RX ORDER — RILUZOLE 50 MG/1
1 TABLET ORAL
Qty: 0 | Refills: 0 | DISCHARGE

## 2020-10-21 RX ORDER — LINACLOTIDE 145 UG/1
1 CAPSULE, GELATIN COATED ORAL
Qty: 0 | Refills: 0 | DISCHARGE

## 2020-10-21 RX ORDER — MORPHINE SULFATE 50 MG/1
2 CAPSULE, EXTENDED RELEASE ORAL
Refills: 0 | Status: DISCONTINUED | OUTPATIENT
Start: 2020-10-21 | End: 2020-10-21

## 2020-10-21 RX ORDER — ESCITALOPRAM OXALATE 10 MG/1
1 TABLET, FILM COATED ORAL
Qty: 0 | Refills: 0 | DISCHARGE

## 2020-10-21 RX ORDER — SODIUM CHLORIDE 9 MG/ML
1000 INJECTION INTRAMUSCULAR; INTRAVENOUS; SUBCUTANEOUS
Refills: 0 | Status: DISCONTINUED | OUTPATIENT
Start: 2020-10-21 | End: 2020-10-21

## 2020-10-21 RX ADMIN — SODIUM CHLORIDE 75 MILLILITER(S): 9 INJECTION INTRAMUSCULAR; INTRAVENOUS; SUBCUTANEOUS at 05:58

## 2020-10-21 RX ADMIN — MORPHINE SULFATE 2 MILLIGRAM(S): 50 CAPSULE, EXTENDED RELEASE ORAL at 20:30

## 2020-10-21 RX ADMIN — RILUZOLE 50 MILLIGRAM(S): 50 TABLET ORAL at 05:55

## 2020-10-21 RX ADMIN — LINACLOTIDE 290 MICROGRAM(S): 145 CAPSULE, GELATIN COATED ORAL at 06:14

## 2020-10-21 RX ADMIN — PIPERACILLIN AND TAZOBACTAM 25 GRAM(S): 4; .5 INJECTION, POWDER, LYOPHILIZED, FOR SOLUTION INTRAVENOUS at 05:55

## 2020-10-21 RX ADMIN — MORPHINE SULFATE 2 MILLIGRAM(S): 50 CAPSULE, EXTENDED RELEASE ORAL at 20:10

## 2020-10-21 RX ADMIN — MORPHINE SULFATE 2 MILLIGRAM(S): 50 CAPSULE, EXTENDED RELEASE ORAL at 18:37

## 2020-10-21 RX ADMIN — SODIUM CHLORIDE 1000 MILLILITER(S): 9 INJECTION INTRAMUSCULAR; INTRAVENOUS; SUBCUTANEOUS at 01:06

## 2020-10-21 RX ADMIN — MORPHINE SULFATE 2 MILLIGRAM(S): 50 CAPSULE, EXTENDED RELEASE ORAL at 18:52

## 2020-10-21 RX ADMIN — POLYETHYLENE GLYCOL 3350 17 GRAM(S): 17 POWDER, FOR SOLUTION ORAL at 05:55

## 2020-10-21 NOTE — CONSULT NOTE ADULT - SUBJECTIVE AND OBJECTIVE BOX
HPI:  68 y/o F with PMHx of ALS, HTN, s/p PEG tube on tube feedings presented to the ED with a chief complaint of dyspnea as per her son. She is strict NPO and just recently had PEG placed in [facility] with Dr. Timmons. Her son states that she was doing well until Thurs 10/15, she had decreased food intake although he states he has been hydrating her through the PEG tube. She has been constipated, with minimal bowel movements last had a small BM on 10/17. She has been told by her ALS specialist regarding goals of care and possible tracheostomy placement as opposed to a palliative route. She has yet to make up her mind and has not shared her wishes with her son/HCP Lambert. She is AAOx4 and able to make her own decisions. She appears very anxious and shook her head when told that she was ordered for a CTA to rule out pulmonary embolism. Her son states that there are no contraindications to CTA, no allergies to contrast or hx of renal insufficiency. Son states that she is on Isosource tube feeds approximately 8-12oz 4 times a day, but he is not sure of the exact amount of tube feeds she receives. He just received the kangaroo pump to change her from bolus to continuous feeds. She denies any pain anywhere. Admits anxiety, constipation and some dyspnea. Attempted to use AAC for iPAD without success.    In the ED,  Vital Signs T(F) Max: 97.5 HR: 113 BP: 161/100 RR: 24 SpO2: 88% on RA (desaturated to low 80s on 4L O2 NC)  Labs were unremarkable: Cl 95, Bicarb 35, Albumin 3.1, Alk Phos 150  CXR: no acute infiltrate, left reverse shoulder replacement noted, stool burden noted  EKG SR at 100 bpm no acute changes  Patient with increasing O2 requirements and labored breathing, thus ICU was consulted. (18 Oct 2020 02:36)    PERTINENT PM/SXH:   HTN (hypertension)    ALS (amyotrophic lateral sclerosis)      S/P shoulder replacement, left    S/P percutaneous endoscopic gastrostomy (PEG) tube placement    S/P brain surgery    No significant past surgical history      FAMILY HISTORY:  FHx: leukemia    FHx: breast cancer  in mother      ITEMS NOT CHECKED ARE NOT PRESENT    SOCIAL HISTORY:   Significant other/partner[ ]  Children[ ]  [employer]/Spirituality:  Substance hx:  [ ]   Tobacco hx:  [ ]   Alcohol hx: [ ]   Home Opioid hx:  [ ] I-Stop Reference No:  Living Situation: [ ]Home  [ ]Long term care  [ ]Rehab [ ]Other    ADVANCE DIRECTIVES:    DNR  Yes    MOLST  [ ]  Living Will  [ ]   DECISION MAKER(s):  [ ] Health Care Proxy(s)  [ ] Surrogate(s)  [ ] Guardian           Name(s): Phone Number(s):    BASELINE (I)ADL(s) (prior to admission):  [place]: [ ]Total  [ ] Moderate [ ]Dependent    Allergies    Macrobid (Unknown)  Zithromax (Unknown)    Intolerances    sulfa drugs (Other)  MEDICATIONS  (STANDING):  enoxaparin Injectable 40 milliGRAM(s) SubCutaneous daily  escitalopram 20 milliGRAM(s) Oral daily  glycopyrrolate 1 milliGRAM(s) Oral at bedtime  glycopyrrolate 2 milliGRAM(s) Oral daily  linaclotide 290 MICROGram(s) Oral <User Schedule>  pantoprazole  Injectable 40 milliGRAM(s) IV Push daily  piperacillin/tazobactam IVPB.. 3.375 Gram(s) IV Intermittent every 8 hours  polyethylene glycol 3350 17 Gram(s) Oral two times a day  riluzole 50 milliGRAM(s) Oral two times a day  senna 2 Tablet(s) Oral at bedtime  sodium chloride 0.9%. 1000 milliLiter(s) (75 mL/Hr) IV Continuous <Continuous>    MEDICATIONS  (PRN):  acetaminophen   Tablet .. 650 milliGRAM(s) Oral every 6 hours PRN Mild Pain (1 - 3)  guaiFENesin   Syrup  (Sugar-Free) 200 milliGRAM(s) Oral every 6 hours PRN Cough    PRESENT SYMPTOMS: [ ]Unable to obtain due to poor mentation   Source if other than patient:  [ ]Family   [ ]Team     Pain: [ ]yes [ ]no  QOL impact -   Location -                    Aggravating factors -  Quality -  Radiation -  Timing-  Severity (0-10 scale):  Minimal acceptable level (0-10 scale):     CPOT:    https://www.[facility].org/getattachment/[email or web address]/Critical-Care-Pain-Observation-Tool-(CPOT)      PAIN AD Score:     http://geriatrictoolkit.[facility]/cog/painad.pdf (press ctrl +  left click to view)    Dyspnea:                           [ ]Mild [ ]Moderate [ ]Severe  Anxiety:                             [ ]Mild [ ]Moderate [ ]Severe  Fatigue:                             [ ]Mild [ ]Moderate [ ]Severe  Nausea:                             [ ]Mild [ ]Moderate [ ]Severe  Loss of appetite:              [ ]Mild [ ]Moderate [ ]Severe  Constipation:                    [ ]Mild [ ]Moderate [ ]Severe    Other Symptoms:  [ ]All other review of systems negative     Palliative Performance Status Version 2:         %    http://npcrc.org/files/news/palliative_performance_scale_ppsv2.pdf  PHYSICAL EXAM:  Vital Signs Last 24 Hrs  T(C): 36.5 (21 Oct 2020 11:48), Max: 37.1 (21 Oct 2020 08:16)  T(F): 97.7 (21 Oct 2020 11:48), Max: 98.8 (21 Oct 2020 08:16)  HR: 113 (21 Oct 2020 11:48) (80 - 123)  BP: 97/60 (21 Oct 2020 08:16) (66/41 - 102/67)  BP(mean): --  RR: 19 (21 Oct 2020 11:48) (18 - 19)  SpO2: 92% (21 Oct 2020 11:48) (85% - 97%) I&O's Summary    20 Oct 2020 07:01  -  21 Oct 2020 07:00  --------------------------------------------------------  IN: 1340 mL / OUT: 350 mL / NET: 990 mL      GENERAL:  [ ]Alert  [ ]Oriented x   [ ]Lethargic  [ ]Cachexia  [ ]Unarousable  [ ]Verbal  [ ]Non-Verbal  Behavioral:   [ ] Anxiety  [ ] Delirium [ ] Agitation [ ] Other  HEENT:  [ ]Normal   [ ]Dry mouth   [ ]ET Tube/Trach  [ ]Oral lesions  PULMONARY:   [ ]Clear [ ]Tachypnea  [ ]Audible excessive secretions   [ ]Rhonchi        [ ]Right [ ]Left [ ]Bilateral  [ ]Crackles        [ ]Right [ ]Left [ ]Bilateral  [ ]Wheezing     [ ]Right [ ]Left [ ]Bilateral  [ ]Diminished breath sounds [ ]right [ ]left [ ]bilateral  CARDIOVASCULAR:    [ ]Regular [ ]Irregular [ ]Tachy  [ ]Keith [ ]Murmur [ ]Other  GASTROINTESTINAL:  [ ]Soft  [ ]Distended   [ ]+BS  [ ]Non tender [ ]Tender  [ ]PEG [ ]OGT/ NGT  Last BM:     GENITOURINARY:  [ ]Normal [ ] Incontinent   [ ]Oliguria/Anuria   [ ]Miles  MUSCULOSKELETAL:   [ ]Normal   [ ]Weakness  [ ]Bed/Wheelchair bound [ ]Edema  NEUROLOGIC:   [ ]No focal deficits  [ ]Cognitive impairment  [ ]Dysphagia [ ]Dysarthria [ ]Paresis [ ]Other   SKIN:   [ ]Normal    [ ]Rash  [ ]Pressure ulcer(s)       Present on admission [ ]y [ ]n    CRITICAL CARE:  [ ] Shock Present  [ ]Septic [ ]Cardiogenic [ ]Neurologic [ ]Hypovolemic  [ ]  Vasopressors [ ]  Inotropes   [ ]Respiratory failure present [ ]Mechanical ventilation [ ]Non-invasive ventilatory support [ ]High flow    [ ]Acute  [ ]Chronic [ ]Hypoxic  [ ]Hypercarbic [ ]Other  [ ]Other organ failure     LABS:                        10.6   19.04 )-----------( 199      ( 21 Oct 2020 06:48 )             31.2   10-21    142  |  104  |  17  ----------------------------<  107<H>  3.9   |  35<H>  |  0.32<L>    Ca    9.0      21 Oct 2020 06:48  Phos  3.1     10-21  Mg     2.0     10-21    TPro  6.5  /  Alb  1.8<L>  /  TBili  0.7  /  DBili  x   /  AST  16  /  ALT  25  /  AlkPhos  102  10-21        RADIOLOGY & ADDITIONAL STUDIES:    PROTEIN CALORIE MALNUTRITION PRESENT: [ ]mild [ ]moderate [ ]severe [ ]underweight [ ]morbid obesity  https://www.andeal.org/vault/1393/web/files/ONC/Table_Clinical%20Characteristics%20to%20Document%20Malnutrition-White%20JV%20et%20al%202012.pdf    Height (cm): 162.6 (10-18-20 @ 06:15)  Weight (kg): 53 (10-18-20 @ 06:15)  BMI (kg/m2): 20 (10-18-20 @ 06:15)    [ ]PPSV2 < or = to 30% [ ]significant weight loss  [ ]poor nutritional intake  [ ]anasarca     Albumin, Serum: 1.8 g/dL (10-21-20 @ 06:48)   [ ]Artificial Nutrition      REFERRALS:   [ ]Chaplaincy  [ ]Hospice  [ ]Child Life  [ ]Social Work  [ ]Case management [ ]Holistic Therapy     Goals of Care Document: FERNANDA Gilman (10-19-20 @ 12:29)  Goals of Care Conversation:   Participants:  · Participants  Staff; Dr. Aguirre    Advance Directives:  · Caregiver:  no    Conversation Discussion:  · Conversation Details  GOC and DNR obtained by Dr Bhatt no further needs at this time.      Electronic Signatures:  Christina Gilman (KENDRA)  (Signed 19-Oct-2020 12:30)  	Authored: Goals of Care Conversation      Last Updated: 19-Oct-2020 12:30 by Christina Gilman (KENDRA)     HPI:  68 y/o F with PMHx of ALS, HTN, s/p PEG tube on tube feedings presented to the ED with a chief complaint of dyspnea as per her son. She is strict NPO and just recently had PEG placed in [facility] with Dr. Timmons. Her son states that she was doing well until Thurs 10/15, she had decreased food intake although he states he has been hydrating her through the PEG tube. She has been constipated, with minimal bowel movements last had a small BM on 10/17. She has been told by her ALS specialist regarding goals of care and possible tracheostomy placement as opposed to a palliative route. She has yet to make up her mind and has not shared her wishes with her son/HCP Lambert. She is AAOx4 and able to make her own decisions. She appears very anxious and shook her head when told that she was ordered for a CTA to rule out pulmonary embolism. Her son states that there are no contraindications to CTA, no allergies to contrast or hx of renal insufficiency. Son states that she is on Isosource tube feeds approximately 8-12oz 4 times a day, but he is not sure of the exact amount of tube feeds she receives. He just received the kangaroo pump to change her from bolus to continuous feeds. She denies any pain anywhere. Admits anxiety, constipation and some dyspnea. Attempted to use AAC for iPAD without success.    In the ED,  Vital Signs T(F) Max: 97.5 HR: 113 BP: 161/100 RR: 24 SpO2: 88% on RA (desaturated to low 80s on 4L O2 NC)  Labs were unremarkable: Cl 95, Bicarb 35, Albumin 3.1, Alk Phos 150  CXR: no acute infiltrate, left reverse shoulder replacement noted, stool burden noted  EKG SR at 100 bpm no acute changes  Patient with increasing O2 requirements and labored breathing, thus ICU was consulted. (18 Oct 2020 02:36)    PERTINENT PM/SXH:   HTN (hypertension)    ALS (amyotrophic lateral sclerosis)      S/P shoulder replacement, left    S/P percutaneous endoscopic gastrostomy (PEG) tube placement    S/P brain surgery    No significant past surgical history      FAMILY HISTORY:  FHx: leukemia    FHx: breast cancer  in mother      ITEMS NOT CHECKED ARE NOT PRESENT    SOCIAL HISTORY:   Significant other/partner[x ]  Children[ x]  Temple/Spirituality: Gnosticist  Substance hx:  [ ]   Tobacco hx:  [ ]   Alcohol hx: [ ]   Home Opioid hx:  [ ] I-Stop Reference No:  Living Situation: [x ]Home  [ ]Long term care  [ ]Rehab [ ]Other    ADVANCE DIRECTIVES:    DNR  Yes    MOLST  [ ]  Living Will  [ ]   DECISION MAKER(s):  [ ] Health Care Proxy(s)  [ ] Surrogate(s)  [ ] Guardian           Name(s): Lambert Dale    BASELINE (I)ADL(s) (prior to admission):  [place]: [ ]Total  [ ] Moderate [ x]Dependent    Allergies    Macrobid (Unknown)  Zithromax (Unknown)    Intolerances    sulfa drugs (Other)  MEDICATIONS  (STANDING):  enoxaparin Injectable 40 milliGRAM(s) SubCutaneous daily  escitalopram 20 milliGRAM(s) Oral daily  glycopyrrolate 1 milliGRAM(s) Oral at bedtime  glycopyrrolate 2 milliGRAM(s) Oral daily  linaclotide 290 MICROGram(s) Oral <User Schedule>  pantoprazole  Injectable 40 milliGRAM(s) IV Push daily  piperacillin/tazobactam IVPB.. 3.375 Gram(s) IV Intermittent every 8 hours  polyethylene glycol 3350 17 Gram(s) Oral two times a day  riluzole 50 milliGRAM(s) Oral two times a day  senna 2 Tablet(s) Oral at bedtime  sodium chloride 0.9%. 1000 milliLiter(s) (75 mL/Hr) IV Continuous <Continuous>    MEDICATIONS  (PRN):  acetaminophen   Tablet .. 650 milliGRAM(s) Oral every 6 hours PRN Mild Pain (1 - 3)  guaiFENesin   Syrup  (Sugar-Free) 200 milliGRAM(s) Oral every 6 hours PRN Cough    PRESENT SYMPTOMS: [x ]Unable to obtain due to poor mentation   Source if other than patient:  [ ]Family   [ ]Team     Pain: [ ]yes [ ]no  QOL impact -   Location -                    Aggravating factors -  Quality -  Radiation -  Timing-  Severity (0-10 scale):  Minimal acceptable level (0-10 scale):     CPOT:    https://www.[facility].org/getattachment/[email or web address]/Critical-Care-Pain-Observation-Tool-(CPOT)      PAIN AD Score:     http://geriatrictoolkit.[email or web address]/cog/painad.pdf (press ctrl +  left click to view)    Dyspnea:                           [ ]Mild [ ]Moderate [ ]Severe  Anxiety:                             [ ]Mild [ ]Moderate [ ]Severe  Fatigue:                             [ ]Mild [ ]Moderate [ ]Severe  Nausea:                             [ ]Mild [ ]Moderate [ ]Severe  Loss of appetite:              [ ]Mild [ ]Moderate [ ]Severe  Constipation:                    [ ]Mild [ ]Moderate [ ]Severe    Other Symptoms:  [ ]All other review of systems negative     Palliative Performance Status Version 2:     10    %    http://npcrc.org/files/news/palliative_performance_scale_ppsv2.pdf  PHYSICAL EXAM:  Vital Signs Last 24 Hrs  T(C): 36.5 (21 Oct 2020 11:48), Max: 37.1 (21 Oct 2020 08:16)  T(F): 97.7 (21 Oct 2020 11:48), Max: 98.8 (21 Oct 2020 08:16)  HR: 113 (21 Oct 2020 11:48) (80 - 123)  BP: 97/60 (21 Oct 2020 08:16) (66/41 - 102/67)  BP(mean): --  RR: 19 (21 Oct 2020 11:48) (18 - 19)  SpO2: 92% (21 Oct 2020 11:48) (85% - 97%) I&O's Summary    20 Oct 2020 07:01  -  21 Oct 2020 07:00  --------------------------------------------------------  IN: 1340 mL / OUT: 350 mL / NET: 990 mL    GENERAL: on BIPAP, somnolent, labored breathing, RR 20/min  HEENT:  anicteric, +oral secretions  CHEST/LUNG:  decreased breath sounds, poor inspiration  HEART:  tachycardic, S1, S2  ABDOMEN:  BS+, soft, nondistended; PEG in place  EXTREMITIES: no edema, cyanosis.   NERVOUS SYSTEM: opens eyes to verbal stimuli,, nonverbal, nearly quadriplegic with some minimal movement in arms  Psych: flat affect     LABS:                        10.6   19.04 )-----------( 199      ( 21 Oct 2020 06:48 )             31.2   10-21    142  |  104  |  17  ----------------------------<  107<H>  3.9   |  35<H>  |  0.32<L>    Ca    9.0      21 Oct 2020 06:48  Phos  3.1     10-21  Mg     2.0     10-21    TPro  6.5  /  Alb  1.8<L>  /  TBili  0.7  /  DBili  x   /  AST  16  /  ALT  25  /  AlkPhos  102  10-21    RADIOLOGY & ADDITIONAL STUDIES: reviewed    PROTEIN CALORIE MALNUTRITION PRESENT: [ ]mild [ ]moderate [x ]severe [ ]underweight [ ]morbid obesity  https://www.andeal.org/vault/2440/web/files/ONC/Table_Clinical%20Characteristics%20to%20Document%20Malnutrition-White%20JV%20et%20al%460546.pdf    Height (cm): 162.6 (10-18-20 @ 06:15)  Weight (kg): 53 (10-18-20 @ 06:15)  BMI (kg/m2): 20 (10-18-20 @ 06:15)    [ ]PPSV2 < or = to 30% [ ]significant weight loss  [ ]poor nutritional intake  [ ]anasarca     Albumin, Serum: 1.8 g/dL (10-21-20 @ 06:48)   [ ]Artificial Nutrition      REFERRALS:   [ ]Chaplaincy  [x ]Hospice  [ ]Child Life  [ ]Social Work  [ ]Case management [ ]Holistic Therapy     Goals of Care Document: FERNANDA Gilman (10-19-20 @ 12:29)  Goals of Care Conversation:   Participants:  · Participants  Staff; Dr. Aguirre    Advance Directives:  · Caregiver:  no    Conversation Discussion:  · Conversation Details  GOC and DNR obtained by Dr Bhatt no further needs at this time.      Electronic Signatures:  Christina Gilman (RN)  (Signed 19-Oct-2020 12:30)  	Authored: Goals of Care Conversation      Last Updated: 19-Oct-2020 12:30 by Christina Gilman (RN)

## 2020-10-21 NOTE — PROVIDER CONTACT NOTE (OTHER) - ACTION/TREATMENT ORDERED:
Mag & Phos for AM
MD at bedside assessing patient. IV Bolus ordered. Will continue to monitor
Will continue to monitor

## 2020-10-21 NOTE — PROGRESS NOTE ADULT - ATTENDING COMMENTS
68 yo woman with Hx htn, advanced ALS (nonverbal), dysphagia with PEG presenting with acute on chronic hypercapnic respiratory failure.  No obvious cause for acute worsening.  Doubt PE with normal Ddimer.  No evidence of pneumonia, though she was hypothermic on admission.    --ALS, anxiety  continue riluzole and glycopyrrolate  continue lexapro  --htn, continue norvasc  --acute on chronic hypercapnic respiratory failure   ?due to progression of ALS, no other obvious acute cause  improved on BiPAP  transitioned to NC this am and tolerating thus far  will likely need BiPAP on/off  would benefit from cough assist device   --normal renal function  --dysphagia with PEG, hold TF while on BiPAP  --doubt acute infection, was hypothermic in ED but no other signs  f/u Cx data but hold Abx for now  --will plan for GOC discussion with pt and son today  DNR/DNI is appropriate given advanced ALS
Advanced care planning was discussed with patient and family.  Advanced care planning forms were reviewed and discussed.  Risks, benefits and alternatives of gastroenterologic procedures were discussed in detail and all questions were answered.    30 minutes spent.
69F h/o advanced ALS (nonverbal), dysphagia with PEG, HTN p/w acute on chronic hypercapnic respiratory failure requiring NIPPV, now DNR/DNI and stable on room air/nasal canula.      Neuro: baseline mental status, continue riluzole and glycopyrrolate, continue lexapro  CV: no acute issues, continue amlodipine for HTN  Pulm: acute on chronic hypercapnic respiratory failure likely 2/2 underlying ALS, no evidence of PNA or other acute cause of exacerbation, now improved after BiPAP, continue nasal canula/rooma air with prn BiPAP  GI: dysphagia with PEG, can restart enteral feeds; continue linzess, miralax and protonix  Renal: no acute issues; replete K+, monitor electrolytes, renal function and UOP   ID: low suspicion for acute infectious process; monitor off abx pending culture results  Heme: continue lovenox dvt ppx    DNR/DNI. Stable for transfer to floor.
Note written by attending, see above.  Time spent: 40min. More than 50% of the visit was spent counseling the patient on medical condition - acute hypoxic and hypercarbic resp failure, BiPAP, goals of care.
Note written by attending, see above.  Time spent: 98min total time spent (73min on critical care in setting of acute sepsis and acute hypoxic/hypercarbic resp failure ; and 25min spent on ACP/GOC discussion with pt's son detailed above).

## 2020-10-21 NOTE — PROGRESS NOTE ADULT - REASON FOR ADMISSION
acute hypoxic respiratory failure

## 2020-10-21 NOTE — DISCHARGE NOTE PROVIDER - NSDCCPCAREPLAN_GEN_ALL_CORE_FT
PRINCIPAL DISCHARGE DIAGNOSIS  Diagnosis: Hypoxemia requiring supplemental oxygen  Assessment and Plan of Treatment: You were diagnosed with acute respiratory failure.   - You were put on supplemental oxygen during your hospital stay, nasal cannula and BiPAP as needed.   - Chest Xray was performed with findings suggestive of aspiration pneumonia. You were started on IV antibiotics. Please complete antibiotic regimen.   - You will be transferred to inpatient hospice.   - Continue monitoring oxygen saturation with goals >90% O2 saturation. You may use supplemental oxygen as needed, and as tolerated.      SECONDARY DISCHARGE DIAGNOSES  Diagnosis: ALS (amyotrophic lateral sclerosis)  Assessment and Plan of Treatment: You have A.L.S.   - Continue with your home medications  - Follow up with your PCP and neurologist within one week of discharge.     PRINCIPAL DISCHARGE DIAGNOSIS  Diagnosis: Hypoxemia requiring supplemental oxygen  Assessment and Plan of Treatment: You were diagnosed with acute respiratory failure.   - You were put on supplemental oxygen during your hospital stay, nasal cannula and BiPAP as needed.   - Chest Xray was performed with findings suggestive of aspiration pneumonia. You were started on IV antibiotics. Please complete antibiotic regimen.   - You will be transferred to inpatient hospice.   - Continue monitoring oxygen saturation with goals >90% O2 saturation. You may use supplemental oxygen as needed, and as tolerated.      SECONDARY DISCHARGE DIAGNOSES  Diagnosis: Acute sepsis  Assessment and Plan of Treatment: You were diagnosed with acute sepsis during this admission. You had an elevated white blood count, rapid breathing rate, and rapid heart rate likely attributed to aspiration pneumonia. You have advanced ALS, which is leading to inability to swallow and get rid of oral secretions, resulting in   now with leukocytosis to 21, tachypnea, tachycardia likely sepsis due to aspiration PNA. CXR now showing LLL consolidation.   -pt is extremely high risk for aspiration  -ID ([place]), recs appreciated  -started on zosyn  -given 1L NS bolus  -BP stabilizing  -monitor  -c/w GOC discussion with family.    Diagnosis: ALS (amyotrophic lateral sclerosis)  Assessment and Plan of Treatment: You have A.L.S.   - Continue with your home medications  - Follow up with your Neurologist/ALS Specialist, Dr. Carrington Da Silva, within one week of discharge.     PRINCIPAL DISCHARGE DIAGNOSIS  Diagnosis: Hypoxemia requiring supplemental oxygen  Assessment and Plan of Treatment: You were diagnosed with acute respiratory failure.   - You were put on supplemental oxygen during your hospital stay, nasal cannula and BiPAP as needed.   - Chest Xray was performed with findings suggestive of aspiration pneumonia. You were started on IV antibiotics. Please complete antibiotic regimen.   - You will be transferred to inpatient hospice.   - Continue monitoring oxygen saturation with goals >90% O2 saturation. You may use supplemental oxygen as needed, and as tolerated.      SECONDARY DISCHARGE DIAGNOSES  Diagnosis: Acute sepsis  Assessment and Plan of Treatment: You were diagnosed with acute sepsis during this admission. You had an elevated white blood count, rapid breathing rate, and rapid heart rate likely attributed to aspiration pneumonia.   - Chest Xray performed with findings consistent with aspiration pneumonia.   -You were started on antibiotic regimen.  - You will be transferred to inpatient hospice.    Diagnosis: ALS (amyotrophic lateral sclerosis)  Assessment and Plan of Treatment: You have A.L.S.   - Continue with your home medications  - Follow up with your Neurologist/ALS Specialist, Dr. Carrington Da Silva, within one week of discharge.     PRINCIPAL DISCHARGE DIAGNOSIS  Diagnosis: Hypoxemia requiring supplemental oxygen  Assessment and Plan of Treatment: You were diagnosed with acute respiratory failure.   - You were put on supplemental oxygen during your hospital stay, nasal cannula and BiPAP as needed.   - Chest Xray was performed with findings suggestive of aspiration pneumonia. You were started on IV antibiotics. Please complete antibiotic regimen.   - You will be transferred to inpatient hospice.   - You may use supplemental oxygen as needed tolerated.      SECONDARY DISCHARGE DIAGNOSES  Diagnosis: Acute sepsis  Assessment and Plan of Treatment: You were diagnosed with acute sepsis during this admission. You had an elevated white blood count, rapid breathing rate, and rapid heart rate likely attributed to aspiration pneumonia.   - Chest Xray performed with findings consistent with aspiration pneumonia.   -You were started on antibiotic regimen.  - You will be transferred to inpatient hospice.    Diagnosis: ALS (amyotrophic lateral sclerosis)  Assessment and Plan of Treatment: You have A.L.S.   - Continue with your home medications  -You will be transferred to inpatient hospice fo further care.

## 2020-10-21 NOTE — PROGRESS NOTE ADULT - SUBJECTIVE AND OBJECTIVE BOX
INTERVAL HPI/OVERNIGHT EVENTS:  pt seen and examined   multiple BM    MEDICATIONS  (STANDING):  amLODIPine   Tablet 5 milliGRAM(s) Oral daily  chlorhexidine 2% Cloths 1 Application(s) Topical daily  chlorhexidine 4% Liquid 1 Application(s) Topical <User Schedule>  dextrose 5% + sodium chloride 0.9%. 1000 milliLiter(s) (30 mL/Hr) IV Continuous <Continuous>  enoxaparin Injectable 40 milliGRAM(s) SubCutaneous daily  escitalopram 20 milliGRAM(s) Oral daily  glycopyrrolate 1 milliGRAM(s) Oral at bedtime  glycopyrrolate 2 milliGRAM(s) Oral daily  linaclotide 290 MICROGram(s) Oral <User Schedule>  pantoprazole  Injectable 40 milliGRAM(s) IV Push daily  polyethylene glycol 3350 17 Gram(s) Oral two times a day  potassium chloride   Powder 40 milliEquivalent(s) Oral every 4 hours  riluzole 50 milliGRAM(s) Oral two times a day  senna 2 Tablet(s) Oral at bedtime    MEDICATIONS  (PRN):  acetaminophen   Tablet .. 650 milliGRAM(s) Oral every 6 hours PRN Mild Pain (1 - 3)  guaiFENesin   Syrup  (Sugar-Free) 200 milliGRAM(s) Oral every 6 hours PRN Cough      Allergies    Macrobid (Unknown)  Zithromax (Unknown)    Intolerances    sulfa drugs (Other)      Review of Systems:    unable to obtain       Vital Signs Last 24 Hrs  T(C): 36.4 (19 Oct 2020 12:00), Max: 36.9 (18 Oct 2020 23:30)  T(F): 97.5 (19 Oct 2020 12:00), Max: 98.4 (18 Oct 2020 23:30)  HR: 104 (19 Oct 2020 12:00) (95 - 124)  BP: 128/71 (19 Oct 2020 12:00) (119/64 - 171/91)  BP(mean): 92 (19 Oct 2020 12:00) (85 - 123)  RR: 26 (19 Oct 2020 12:00) (22 - 60)  SpO2: 98% (19 Oct 2020 12:00) (92% - 100%)    PHYSICAL EXAM:    Constitutional: lying in bed  HEENT: ncat  Gastrointestinal: soft nt nd +peg  Extremities: mild edema  Vascular: + peripheral pulses  Neurological: responsive    LABS:                        13.0   9.99  )-----------( 247      ( 19 Oct 2020 05:25 )             36.1     10    136  |  97  |  10  ----------------------------<  128<H>  3.1<L>   |  35<H>  |  0.29<L>    Ca    9.8      19 Oct 2020 05:25  Phos  2.9     10-19  Mg     1.6     10-19    TPro  8.7<H>  /  Alb  2.7<L>  /  TBili  0.6  /  DBili  x   /  AST  18  /  ALT  36  /  AlkPhos  130<H>  1018    PT/INR - ( 18 Oct 2020 08:01 )   PT: 12.5 sec;   INR: 1.07 ratio         PTT - ( 18 Oct 2020 08:01 )  PTT:42.9 sec  Urinalysis Basic - ( 18 Oct 2020 09:19 )    Color: Pale Yellow / Appearance: Slightly Turbid / S.010 / pH: x  Gluc: x / Ketone: Negative  / Bili: Negative / Urobili: Negative   Blood: x / Protein: Negative / Nitrite: Negative   Leuk Esterase: Trace / RBC: x / WBC 3-5   Sq Epi: x / Non Sq Epi: Few / Bacteria: Occasional        RADIOLOGY & ADDITIONAL TESTS:

## 2020-10-21 NOTE — DISCHARGE NOTE NURSING/CASE MANAGEMENT/SOCIAL WORK - PATIENT PORTAL LINK FT
You can access the FollowMyHealth Patient Portal offered by [facility] by registering at the following website: [email or web address]. By joining [facility]’s FollowMyHealth portal, you will also be able to view your health information using other applications (apps) compatible with our system.

## 2020-10-21 NOTE — PROGRESS NOTE ADULT - SUBJECTIVE AND OBJECTIVE BOX
Date/Time Patient Seen:  		  Referring MD:   Data Reviewed	       Patient is a 70y old  Female who presents with a chief complaint of acute hypoxic respiratory failure (20 Oct 2020 21:03)      Subjective/HPI     PAST MEDICAL & SURGICAL HISTORY:  HTN (hypertension)    ALS (amyotrophic lateral sclerosis)    S/P shoulder replacement, left    S/P percutaneous endoscopic gastrostomy (PEG) tube placement    S/P brain surgery  for neuroma    No significant past surgical history          Medication list         MEDICATIONS  (STANDING):  enoxaparin Injectable 40 milliGRAM(s) SubCutaneous daily  escitalopram 20 milliGRAM(s) Oral daily  glycopyrrolate 1 milliGRAM(s) Oral at bedtime  glycopyrrolate 2 milliGRAM(s) Oral daily  linaclotide 290 MICROGram(s) Oral <User Schedule>  pantoprazole  Injectable 40 milliGRAM(s) IV Push daily  piperacillin/tazobactam IVPB.. 3.375 Gram(s) IV Intermittent every 8 hours  polyethylene glycol 3350 17 Gram(s) Oral two times a day  riluzole 50 milliGRAM(s) Oral two times a day  senna 2 Tablet(s) Oral at bedtime  sodium chloride 0.9%. 1000 milliLiter(s) (75 mL/Hr) IV Continuous <Continuous>    MEDICATIONS  (PRN):  acetaminophen   Tablet .. 650 milliGRAM(s) Oral every 6 hours PRN Mild Pain (1 - 3)  guaiFENesin   Syrup  (Sugar-Free) 200 milliGRAM(s) Oral every 6 hours PRN Cough         Vitals log        ICU Vital Signs Last 24 Hrs  T(C): 36.9 (21 Oct 2020 05:21), Max: 37 (21 Oct 2020 00:32)  T(F): 98.4 (21 Oct 2020 05:21), Max: 98.6 (21 Oct 2020 00:32)  HR: 103 (21 Oct 2020 06:06) (80 - 123)  BP: 102/67 (21 Oct 2020 05:21) (66/41 - 163/88)  BP(mean): --  ABP: --  ABP(mean): --  RR: 18 (21 Oct 2020 05:21) (18 - 26)  SpO2: 93% (21 Oct 2020 05:21) (85% - 101%)           Input and Output:  I&O's Detail    20 Oct 2020 07:01  -  21 Oct 2020 07:00  --------------------------------------------------------  IN:    dextrose 5% + sodium chloride 0.9%: 390 mL    Enteral Tube Flush: 100 mL    IV PiggyBack: 100 mL    sodium chloride 0.9%: 300 mL    sodium chloride 0.9%: 450 mL  Total IN: 1340 mL    OUT:    Voided (mL): 350 mL  Total OUT: 350 mL    Total NET: 990 mL          Lab Data                        10.6   19.04 )-----------( 199      ( 21 Oct 2020 06:48 )             31.2     10-21    142  |  104  |  x   ----------------------------<  107<H>  3.9   |  35<H>  |  0.32<L>    Ca    9.0      21 Oct 2020 06:48  Phos  3.1     10-21  Mg     2.0     10-21    TPro  6.5  /  Alb  1.8<L>  /  TBili  0.7  /  DBili  x   /  AST  16  /  ALT  25  /  AlkPhos  102  10-21    ABG - ( 20 Oct 2020 17:01 )  pH, Arterial: 7.37  pH, Blood: x     /  pCO2: 63    /  pO2: 58    / HCO3: 32    / Base Excess: 9.9   /  SaO2: 89                      Review of Systems	      Objective     Physical Examination    heart s1s2  lung dc BS  abd soft      Pertinent Lab findings & Imaging      Ginger:  NO   Adequate UO     I&O's Detail    20 Oct 2020 07:01  -  21 Oct 2020 07:00  --------------------------------------------------------  IN:    dextrose 5% + sodium chloride 0.9%: 390 mL    Enteral Tube Flush: 100 mL    IV PiggyBack: 100 mL    sodium chloride 0.9%: 300 mL    sodium chloride 0.9%: 450 mL  Total IN: 1340 mL    OUT:    Voided (mL): 350 mL  Total OUT: 350 mL    Total NET: 990 mL               Discussed with:     Cultures:	        Radiology

## 2020-10-21 NOTE — CHART NOTE - NSCHARTNOTEFT_GEN_A_CORE
Called by RN for low BP of 89/60. Pt seen and examined at bedside. Pt nonverbal and sleeping with AVAPS on.     T(C): 37 (10-21-20 @ 00:32), Max: 37 (10-21-20 @ 00:32)  HR: 98 (10-21-20 @ 00:47) (55 - 123)  BP: 89/60 (10-21-20 @ 00:32) (66/41 - 163/88)  RR: 18 (10-21-20 @ 00:46) (18 - 26)  SpO2: 94% (10-21-20 @ 00:47) (85% - 101%)    Physical :  Gen- on AVAPS resting  Cardio - s+1,s+2, rrr,  Lung - clear distant breath sounds in upper bases b/l, diminished breath sounds in lower bases no wheeze, no rhonchi, no rales   Abdomen- +BS, NT/ND, no guarding, no rebound, no masses  Ext- no edema, 2+ pulses b/l    LABS:                        12.4   21.50 )-----------( 216      ( 20 Oct 2020 06:49 )             34.6     10-20    140  |  100  |  13  ----------------------------<  178<H>  3.8   |  37<H>  |  0.34<L>    Ca    9.5      20 Oct 2020 06:49  Phos  1.3     10-20  Mg     1.7     10-20    TPro  8.0  /  Alb  2.4<L>  /  TBili  0.5  /  DBili  x   /  AST  23  /  ALT  33  /  AlkPhos  124<H>  10-20        ABG - ( 20 Oct 2020 17:01 )  pH, Arterial: 7.37  pH, Blood: x     /  pCO2: 63    /  pO2: 58    / HCO3: 32    / Base Excess: 9.9   /  SaO2: 89          Assessment/Plan  68 y/o F with PMHx of HTN, advanced ALS, s/p PEG tube on tube feedings, quadriplegic, presented to the ED with a chief complaint of dyspnea a/w acute hypoxic and hypercapnic respiratory failure likely in the setting of progressive ALS, now with sepsis and recurrent acute hypoxic and hypercapnic respiratory failure due to aspiration PNA in setting of end-stage ALS. Pt now with low blood pressure on 89/60.    - Pt with hypotension during this admission, previously managed with fluid bolus   - will give pt 500mL bolus of NS over 30 minutes  - discontinued 1000mL NS at 75mL/hr  - discussed with house doc  - RN to call if any changes    Dr. Mai  PGY1  [phone] Called by RN for low BP of 89/60. Pt seen and examined at bedside. Pt nonverbal and sleeping with AVAPS on.     T(C): 37 (10-21-20 @ 00:32), Max: 37 (10-21-20 @ 00:32)  HR: 98 (10-21-20 @ 00:47) (55 - 123)  BP: 89/60 (10-21-20 @ 00:32) (66/41 - 163/88)  RR: 18 (10-21-20 @ 00:46) (18 - 26)  SpO2: 94% (10-21-20 @ 00:47) (85% - 101%)    Physical :  Gen- on AVAPS resting  Cardio - s+1,s+2, rrr,  Lung - clear distant breath sounds in upper bases b/l, diminished breath sounds in lower bases no wheeze, no rhonchi, no rales   Abdomen- +BS, NT/ND, no guarding, no rebound, no masses  Ext- no edema, 2+ pulses b/l    LABS:                        12.4   21.50 )-----------( 216      ( 20 Oct 2020 06:49 )             34.6     10-20    140  |  100  |  13  ----------------------------<  178<H>  3.8   |  37<H>  |  0.34<L>    Ca    9.5      20 Oct 2020 06:49  Phos  1.3     10-20  Mg     1.7     10-20    TPro  8.0  /  Alb  2.4<L>  /  TBili  0.5  /  DBili  x   /  AST  23  /  ALT  33  /  AlkPhos  124<H>  10-20        ABG - ( 20 Oct 2020 17:01 )  pH, Arterial: 7.37  pH, Blood: x     /  pCO2: 63    /  pO2: 58    / HCO3: 32    / Base Excess: 9.9   /  SaO2: 89          Assessment/Plan  70 y/o F with PMHx of HTN, advanced ALS, s/p PEG tube on tube feedings, quadriplegic, presented to the ED with a chief complaint of dyspnea a/w acute hypoxic and hypercapnic respiratory failure likely in the setting of progressive ALS, now with sepsis and recurrent acute hypoxic and hypercapnic respiratory failure due to aspiration PNA in setting of end-stage ALS. Pt now with low blood pressure on 89/60.    - Pt with hypotension during this admission, previously managed with fluid bolus   - will give pt 500mL bolus of NS over 30 minutes  - discontinued 1000mL NS at 75mL/hr  - RN to call if any changes    Dr. Mai  PGY1  [phone] Called by RN for low BP of 89/60. Pt seen and examined at bedside. Pt nonverbal and sleeping with AVAPS on.     T(C): 37 (10-21-20 @ 00:32), Max: 37 (10-21-20 @ 00:32)  HR: 98 (10-21-20 @ 00:47) (55 - 123)  BP: 89/60 (10-21-20 @ 00:32) (66/41 - 163/88)  RR: 18 (10-21-20 @ 00:46) (18 - 26)  SpO2: 94% (10-21-20 @ 00:47) (85% - 101%)    Physical :  Gen- on AVAPS resting  Cardio - s+1,s+2, rrr,  Lung - clear distant breath sounds in upper bases b/l, diminished breath sounds in lower bases no wheeze, no rhonchi, no rales   Abdomen- +BS, NT/ND, no guarding, no rebound, no masses  Ext- no edema, 2+ pulses b/l    LABS:                        12.4   21.50 )-----------( 216      ( 20 Oct 2020 06:49 )             34.6     10-20    140  |  100  |  13  ----------------------------<  178<H>  3.8   |  37<H>  |  0.34<L>    Ca    9.5      20 Oct 2020 06:49  Phos  1.3     10-20  Mg     1.7     10-20    TPro  8.0  /  Alb  2.4<L>  /  TBili  0.5  /  DBili  x   /  AST  23  /  ALT  33  /  AlkPhos  124<H>  10-20        ABG - ( 20 Oct 2020 17:01 )  pH, Arterial: 7.37  pH, Blood: x     /  pCO2: 63    /  pO2: 58    / HCO3: 32    / Base Excess: 9.9   /  SaO2: 89          Assessment/Plan  70 y/o F with PMHx of HTN, advanced ALS, s/p PEG tube on tube feedings, quadriplegic, presented to the ED with a chief complaint of dyspnea a/w acute hypoxic and hypercapnic respiratory failure likely in the setting of progressive ALS, now with sepsis and recurrent acute hypoxic and hypercapnic respiratory failure due to aspiration PNA in setting of end-stage ALS. Pt now with low blood pressure on 89/60.    - Pt with hypotension during this admission, previously managed with fluid bolus   - will give pt 500mL bolus of NS over 30 minutes  - continue 1000mL NS at 75mL/hr after bolus  - RN to call if any changes    Dr. Mai  PGY1  [phone]

## 2020-10-21 NOTE — DISCHARGE NOTE PROVIDER - HOSPITAL COURSE
FROM ADMISSION H+P:   HPI:  70 y/o F with PMHx of ALS, HTN, s/p PEG tube on tube feedings presented to the ED with a chief complaint of dyspnea as per her son. She is strict NPO and just recently had PEG placed in [facility] with Dr. Timmons. Her son states that she was doing well until Thurs 10/15, she had decreased food intake although he states he has been hydrating her through the PEG tube. She has been constipated, with minimal bowel movements last had a small BM on 10/17. She has been told by her ALS specialist regarding goals of care and possible tracheostomy placement as opposed to a palliative route. She has yet to make up her mind and has not shared her wishes with her son/HCP Lambert. She is AAOx4 and able to make her own decisions. She appears very anxious and shook her head when told that she was ordered for a CTA to rule out pulmonary embolism. Her son states that there are no contraindications to CTA, no allergies to contrast or hx of renal insufficiency. Son states that she is on Isosource tube feeds approximately 8-12oz 4 times a day, but he is not sure of the exact amount of tube feeds she receives. He just received the kangaroo pump to change her from bolus to continuous feeds. She denies any pain anywhere. Admits anxiety, constipation and some dyspnea. Attempted to use AAC for iPAD without success.    In the ED,  Vital Signs T(F) Max: 97.5 HR: 113 BP: 161/100 RR: 24 SpO2: 88% on RA (desaturated to low 80s on 4L O2 NC)  Labs were unremarkable: Cl 95, Bicarb 35, Albumin 3.1, Alk Phos 150  CXR: no acute infiltrate, left reverse shoulder replacement noted, stool burden noted  EKG SR at 100 bpm no acute changes  Patient with increasing O2 requirements and labored breathing, thus ICU was consulted. (18 Oct 2020 02:36)      ---  HOSPITAL COURSE:   Patient was admitted for acute hypoxic and hypercapnic respiratory failure likely in the setting of progressive ALS. Patient with desaturation in ED, and increased work of breathing, prompting patient to be placed on BiPAP and sent to ICU. Patient tolerated BiPAP well, and was later transitioned to nasal cannula with BiPAP prn. Patient was downgraded to telemetry medicine floor. Patient with constipation since PEG tube placement (not on this admission), started on bowel regimen. Patient had bowel movements, continued on bowel regimen during stay. Hospital stay was complicated by episode of labored, shallow breathing on 10/20 and change of mental status. ABG showed is hypercapnic respiratory failure. CXR performed and showed LLL consolidation. ID, Dr. Mead, was consulted and recommended IV Zosyn. Patient became hypotensive x2 during hospital stay, requiring IVF bolus. Palliative, Dr. Archer, consulted, goals of care conversation with family and decision was made for hospice care. Patient showed improvement throughout hospitalization. Patient was seen and examined on day of discharge. Patient was medically optimized for discharge hospice, with close outpatient follow up.  ---  CONSULTANTS:   Pulm- Dr. Choi  GI- Dr. Urbina  Palliative- Dr. Archer  ID- Dr. Mead  ---  TIME SPENT:  I, the attending physician, was physically present for the key portions of the evaluation and management (E/M) service provided. The total amount of time spent reviewing the hospital notes, laboratory values, imaging findings, assessing/counseling the patient, discussing with consultant physicians, social work, nursing staff was -- minutes    ---  Primary care provider was made aware of plan for discharge:      [  ] NO     [  ] YES   FROM ADMISSION H+P:   HPI:  68 y/o F with PMHx of ALS, HTN, s/p PEG tube on tube feedings presented to the ED with a chief complaint of dyspnea as per her son. She is strict NPO and just recently had PEG placed in [facility] with Dr. Timmons. Her son states that she was doing well until Thurs 10/15, she had decreased food intake although he states he has been hydrating her through the PEG tube. She has been constipated, with minimal bowel movements last had a small BM on 10/17. She has been told by her ALS specialist regarding goals of care and possible tracheostomy placement as opposed to a palliative route. She has yet to make up her mind and has not shared her wishes with her son/HCP Lambert. She is AAOx4 and able to make her own decisions. She appears very anxious and shook her head when told that she was ordered for a CTA to rule out pulmonary embolism. Her son states that there are no contraindications to CTA, no allergies to contrast or hx of renal insufficiency. Son states that she is on Isosource tube feeds approximately 8-12oz 4 times a day, but he is not sure of the exact amount of tube feeds she receives. He just received the kangaroo pump to change her from bolus to continuous feeds. She denies any pain anywhere. Admits anxiety, constipation and some dyspnea. Attempted to use AAC for iPAD without success.    In the ED,  Vital Signs T(F) Max: 97.5 HR: 113 BP: 161/100 RR: 24 SpO2: 88% on RA (desaturated to low 80s on 4L O2 NC)  Labs were unremarkable: Cl 95, Bicarb 35, Albumin 3.1, Alk Phos 150  CXR: no acute infiltrate, left reverse shoulder replacement noted, stool burden noted  EKG SR at 100 bpm no acute changes  Patient with increasing O2 requirements and labored breathing, thus ICU was consulted. (18 Oct 2020 02:36)      ---  HOSPITAL COURSE:   Patient was admitted for acute hypoxic and hypercapnic respiratory failure likely in the setting of progressive ALS. Patient with desaturation in ED, and increased work of breathing, prompting patient to be placed on BiPAP and sent to ICU. Patient tolerated BiPAP well, and was later transitioned to nasal cannula with BiPAP prn. Patient was downgraded to telemetry medicine floor. Patient with constipation since PEG tube placement (not on this admission), started on bowel regimen. Patient had bowel movements, continued on bowel regimen during stay. Hospital stay was complicated by episode of labored, shallow breathing on 10/20 and change of mental status. ABG showed is hypercapnic respiratory failure. CXR performed and showed LLL consolidation. ID, Dr. Mead, was consulted and recommended IV Zosyn. Patient became hypotensive x2 during hospital stay, requiring IVF bolus. Palliative, Dr. Archer, consulted, goals of care conversation with family and decision was made for hospice care. Patient showed improvement throughout hospitalization. Patient was seen and examined on day of discharge. Patient was medically optimized for discharge hospice, with close outpatient follow up.    Vital Signs Last 24 Hrs  T(C): 36.5 (21 Oct 2020 11:48), Max: 37.1 (21 Oct 2020 08:16)  T(F): 97.7 (21 Oct 2020 11:48), Max: 98.8 (21 Oct 2020 08:16)  HR: 113 (21 Oct 2020 11:48) (80 - 123)  BP: 97/60 (21 Oct 2020 08:16) (66/41 - 102/67)  BP(mean): --  RR: 19 (21 Oct 2020 11:48) (18 - 19)  SpO2: 92% (21 Oct 2020 11:48) (85% - 97%)    T(C): 36.5 (10-21-20 @ 11:48), Max: 37.1 (10-21-20 @ 08:16)  HR: 113 (10-21-20 @ 11:48) (80 - 123)  BP: 97/60 (10-21-20 @ 08:16) (66/41 - 102/67)  RR: 19 (10-21-20 @ 11:48) (18 - 19)  SpO2: 92% (10-21-20 @ 11:48) (85% - 97%)      GENERAL: somnolent, in NAD  HEENT:  anicteric, +oral secretions  CHEST/LUNG:  decreased breath sounds, poor inspiratory effort  HEART:  tachycardic, +S1/ S2  ABDOMEN:  BS+, soft, nondistended; PEG in place  : farrell visualized  EXTREMITIES: no edema, cyanosis.   NERVOUS SYSTEM: somnolent, nonverbal, quadriplegic with minimal movement in arms    ---  CONSULTANTS:   [place]- Dr. Jimbo HUMPHREY- Dr. Sheikh Brown- Dr. Gianni TORRES- Dr. Mead  ---  TIME SPENT:  I, the attending physician, was physically present for the key portions of the evaluation and management (E/M) service provided. The total amount of time spent reviewing the hospital notes, laboratory values, imaging findings, assessing/counseling the patient, discussing with consultant physicians, social work, nursing staff was -- minutes    ---  Primary care provider was made aware of plan for discharge:      [  ] NO     [  ] YES   FROM ADMISSION H+P:   HPI:  68 y/o F with PMHx of ALS, HTN, s/p PEG tube on tube feedings presented to the ED with a chief complaint of dyspnea as per her son. She is strict NPO and just recently had PEG placed in [facility] with Dr. Timmons. Her son states that she was doing well until Thurs 10/15, she had decreased food intake although he states he has been hydrating her through the PEG tube. She has been constipated, with minimal bowel movements last had a small BM on 10/17. She has been told by her ALS specialist regarding goals of care and possible tracheostomy placement as opposed to a palliative route. She has yet to make up her mind and has not shared her wishes with her son/HCP Lambert. She is AAOx4 and able to make her own decisions. She appears very anxious and shook her head when told that she was ordered for a CTA to rule out pulmonary embolism. Her son states that there are no contraindications to CTA, no allergies to contrast or hx of renal insufficiency. Son states that she is on Isosource tube feeds approximately 8-12oz 4 times a day, but he is not sure of the exact amount of tube feeds she receives. He just received the kangaroo pump to change her from bolus to continuous feeds. She denies any pain anywhere. Admits anxiety, constipation and some dyspnea. Attempted to use AAC for iPAD without success.    In the ED,  Vital Signs T(F) Max: 97.5 HR: 113 BP: 161/100 RR: 24 SpO2: 88% on RA (desaturated to low 80s on 4L O2 NC)  Labs were unremarkable: Cl 95, Bicarb 35, Albumin 3.1, Alk Phos 150  CXR: no acute infiltrate, left reverse shoulder replacement noted, stool burden noted  EKG SR at 100 bpm no acute changes  Patient with increasing O2 requirements and labored breathing, thus ICU was consulted. (18 Oct 2020 02:36)      ---  HOSPITAL COURSE:   Patient was admitted for acute hypoxic and hypercapnic respiratory failure likely in the setting of progressive ALS. Patient with desaturation in ED, and increased work of breathing, prompting patient to be placed on BiPAP and sent to ICU. Patient tolerated BiPAP well, and was later transitioned to nasal cannula with BiPAP prn. Patient was downgraded to telemetry medicine floor. Patient with constipation since PEG tube placement (not on this admission), started on bowel regimen. Patient had bowel movements, continued on bowel regimen during stay. Hospital stay was complicated by episode of labored, shallow breathing on 10/20 and change of mental status. ABG showed is hypercapnic respiratory failure. CXR performed and showed LLL consolidation. ID, Dr. Mead, was consulted and recommended IV Zosyn. Patient became hypotensive x2 during hospital stay, requiring IVF bolus. Palliative, Dr. Archer, consulted, goals of care conversation with family and decision was made for hospice care. Patient showed improvement throughout hospitalization. Patient was seen and examined on day of discharge.    Vital Signs Last 24 Hrs  T(C): 36.5 (21 Oct 2020 11:48), Max: 37.1 (21 Oct 2020 08:16)  T(F): 97.7 (21 Oct 2020 11:48), Max: 98.8 (21 Oct 2020 08:16)  HR: 113 (21 Oct 2020 11:48) (80 - 123)  BP: 97/60 (21 Oct 2020 08:16) (66/41 - 102/67)  BP(mean): --  RR: 19 (21 Oct 2020 11:48) (18 - 19)  SpO2: 92% (21 Oct 2020 11:48) (85% - 97%)    T(C): 36.5 (10-21-20 @ 11:48), Max: 37.1 (10-21-20 @ 08:16)  HR: 113 (10-21-20 @ 11:48) (80 - 123)  BP: 97/60 (10-21-20 @ 08:16) (66/41 - 102/67)  RR: 19 (10-21-20 @ 11:48) (18 - 19)  SpO2: 92% (10-21-20 @ 11:48) (85% - 97%)      GENERAL: somnolent, in NAD  HEENT:  anicteric, +oral secretions  CHEST/LUNG:  decreased breath sounds, poor inspiratory effort  HEART:  tachycardic, +S1/ S2  ABDOMEN:  BS+, soft, nondistended; PEG in place  : farrell visualized  EXTREMITIES: no edema, cyanosis.   NERVOUS SYSTEM: somnolent, nonverbal, quadriplegic with minimal movement in arms     Patient was medically optimized for discharge to inpatient hospice.     ---  CONSULTANTS:   Pulm- Dr. Jimbo HUMPHREY- Dr. Urbina  Palliative- Dr. Gianni TORRES- Dr. Mead  ---  TIME SPENT:  I, the attending physician, was physically present for the key portions of the evaluation and management (E/M) service provided. The total amount of time spent reviewing the hospital notes, laboratory values, imaging findings, assessing/counseling the patient, discussing with consultant physicians, social work, nursing staff was -- minutes    ---  Primary care provider was made aware of plan for discharge:      [  ] NO     [  ] YES   FROM ADMISSION H+P:   HPI:  68 y/o F with PMHx of ALS, HTN, s/p PEG tube on tube feedings presented to the ED with a chief complaint of dyspnea as per her son. She is strict NPO and just recently had PEG placed in [facility] with Dr. Timmons. Her son states that she was doing well until Thurs 10/15, she had decreased food intake although he states he has been hydrating her through the PEG tube. She has been constipated, with minimal bowel movements last had a small BM on 10/17. She has been told by her ALS specialist regarding goals of care and possible tracheostomy placement as opposed to a palliative route. She has yet to make up her mind and has not shared her wishes with her son/HCP Lambert. She is AAOx4 and able to make her own decisions. She appears very anxious and shook her head when told that she was ordered for a CTA to rule out pulmonary embolism. Her son states that there are no contraindications to CTA, no allergies to contrast or hx of renal insufficiency. Son states that she is on Isosource tube feeds approximately 8-12oz 4 times a day, but he is not sure of the exact amount of tube feeds she receives. He just received the kangaroo pump to change her from bolus to continuous feeds. She denies any pain anywhere. Admits anxiety, constipation and some dyspnea. Attempted to use AAC for iPAD without success.    In the ED,  Vital Signs T(F) Max: 97.5 HR: 113 BP: 161/100 RR: 24 SpO2: 88% on RA (desaturated to low 80s on 4L O2 NC)  Labs were unremarkable: Cl 95, Bicarb 35, Albumin 3.1, Alk Phos 150  CXR: no acute infiltrate, left reverse shoulder replacement noted, stool burden noted  EKG SR at 100 bpm no acute changes  Patient with increasing O2 requirements and labored breathing, thus ICU was consulted. (18 Oct 2020 02:36)      ---  HOSPITAL COURSE:   Patient was admitted for acute hypoxic and hypercapnic respiratory failure likely in the setting of progressive ALS. Patient with desaturation in ED, and increased work of breathing, prompting patient to be placed on BiPAP and sent to ICU. Patient tolerated BiPAP well, and was later transitioned to nasal cannula with BiPAP prn. Patient was downgraded to telemetry medicine floor. Patient with constipation since PEG tube placement (not on this admission), started on bowel regimen. Patient had bowel movements, continued on bowel regimen during stay. Hospital stay was complicated by episode of labored, shallow breathing on 10/20 and change of mental status. ABG showed is hypercapnic respiratory failure. CXR performed and showed LLL consolidation. ID, Dr. Mead, was consulted and recommended IV Zosyn. Patient became hypotensive x2 during hospital stay, requiring IVF bolus. Palliative, Dr. Archer, consulted, goals of care conversation with family and decision was made for hospice care. Patient showed improvement throughout hospitalization.     Patient was seen and examined on day of discharge to hospice    ROS: unable to obtain due to pts mental status.    Vital Signs Last 24 Hrs  T(C): 36.5 (21 Oct 2020 11:48), Max: 37.1 (21 Oct 2020 08:16)  T(F): 97.7 (21 Oct 2020 11:48), Max: 98.8 (21 Oct 2020 08:16)  HR: 113 (21 Oct 2020 11:48) (80 - 123)  BP: 97/60 (21 Oct 2020 08:16) (66/41 - 102/67)  BP(mean): --  RR: 19 (21 Oct 2020 11:48) (18 - 19)  SpO2: 92% (21 Oct 2020 11:48) (85% - 97%)    T(C): 36.5 (10-21-20 @ 11:48), Max: 37.1 (10-21-20 @ 08:16)  HR: 113 (10-21-20 @ 11:48) (80 - 123)  BP: 97/60 (10-21-20 @ 08:16) (66/41 - 102/67)  RR: 19 (10-21-20 @ 11:48) (18 - 19)  SpO2: 92% (10-21-20 @ 11:48) (85% - 97%)      GENERAL: somnolent, in NAD  HEENT:  anicteric, +oral secretions  CHEST/LUNG:  decreased breath sounds, poor inspiratory effort  HEART:  tachycardic, +S1/ S2  ABDOMEN:  BS+, soft, nondistended; PEG in place  : farrell visualized  EXTREMITIES: no edema, cyanosis.   NERVOUS SYSTEM: somnolent, nonverbal, quadriplegic with minimal movement in arms     Patient was medically optimized for discharge to inpatient hospice.     ---  CONSULTANTS:   [place]- Dr. Jimbo HUMPHREY- Dr. Urbina  Palliative- Dr. Gianni TORRES- Dr. Mead  ---  TIME SPENT:  I, the attending physician, was physically present for the key portions of the evaluation and management (E/M) service provided. The total amount of time spent reviewing the hospital notes, laboratory values, imaging findings, assessing/counseling the patient, discussing with consultant physicians, social work, nursing staff was -- minutes    ---  Primary care provider was made aware of plan for discharge:      [  ] NO     [  ] YES

## 2020-10-21 NOTE — DISCHARGE NOTE NURSING/CASE MANAGEMENT/SOCIAL WORK - NSDCPEFALRISK_GEN_ALL_CORE
CERTIFICATE OF WORK      December 13, 2019      Re: Tae Sexton  [id number] [address]      This is to certify that Tae Sexton has been under my care from 12/13/2019 and can return to regular work on 12/23/19      REMARKS: re evaluation next week for rediness to return to work        SIGNATURE:___________________________________________          [address]  [address] [place] [id number]  Dept Phone: [phone] Patient information on fall and injury prevention

## 2020-10-21 NOTE — PROGRESS NOTE ADULT - PROBLEM SELECTOR PLAN 2
cont bowel regimen  monitor gi fxn
improving  cont bowel regimen  monitor gi function
improving  cont bowel regimen  monitor gi function
Chronic, On Riluzole and glycopyrrolate at home  - Continue glycopyrrolate  - c/w Riluzole   - c/w Linzess   - Chest PT, Incentive bouchra if tolerated, Turn and position, NIPPV  - poor overall prognosis - GOC w/ palliative team
Chronic, On Riluzole and glycopyrrolate at home  - Continue glycopyrrolate  - c/w Riluzole   - c/w Linzess   - Chest PT, Incentive bouchra if tolerated, Turn and position, NIPPV  - poor overall prognosis - GOC w/ palliative team and family. End-stage ALS

## 2020-10-21 NOTE — PROGRESS NOTE ADULT - PROBLEM SELECTOR PLAN 1
ALS - resp failure - exac of ALS - now with asp pna - sepsis -   CXR repeat shows PNA and leukocytosis - on Zosyn - ID eval noted  resp acidosis improved with use of AVAPS -   chest PT  suction PRN  NIPPV as tolerated -   HOB elev  asp prec  pt has a PEG - Nutrition  caution with sedatives that affect resp drive - eg. Opioids and Benzo,  prognosis poor - appears to have end stage ALS - DNR   appropriate for HOME hospice.

## 2020-10-21 NOTE — CONSULT NOTE ADULT - CONVERSATION DETAILS
Writer met with pt's son. Reviewed patient's medical and social history as well as events leading to patient's hospitalization. Writer discussed patient's current diagnosis (resp failure, end stage ALS, sepsis, end of life), medical condition and management,  terminal prognosis, and short life expectancy. Inquired about patient's wishes regarding extent of medical care to be provided at end of life. Spouse asked for pt to be switched to comfort measures only and receive aggressive symprom management to reduce suffering. Agreed to switching Bipap mask to NRBM. Spouse showed good insight into medical condition. All questions answered. Spouse was grieving appropriately. Psychosocial support provided.     Writer also reached out to pt's son Lambert Shaw to inform him of the above and was able to disclose that pt is at end of life. Son then stated he is at work and didn't want to talk more and then hung up.

## 2020-10-21 NOTE — PROGRESS NOTE ADULT - PROBLEM SELECTOR PLAN 1
cont feeds via peg as tolerated  correct electrolytes prn  strict asp prec, elevate hob  monitor residuals; routine peg care  uptrending wbc, cxr pending  goc noted, dnr/dni

## 2020-10-21 NOTE — CONSULT NOTE ADULT - REASON FOR ADMISSION
acute hypoxic respiratory failure

## 2020-10-21 NOTE — DISCHARGE NOTE PROVIDER - NSDCMRMEDTOKEN_GEN_ALL_CORE_FT
amLODIPine 5 mg oral tablet: 1 tab(s) by gastrostomy tube once a day  glycopyrrolate 1 mg oral tablet: 1 tab(s) by gastrostomy tube once a day (at bedtime)  glycopyrrolate 2 mg oral tablet: 1 tab(s) by gastrostomy tube once a day  Lexapro 20 mg oral tablet: 1 tab(s) by gastrostomy tube once a day  Linzess 290 mcg oral capsule: 1 cap(s) by gastrostomy tube every other day  riluzole 50 mg oral tablet: 1 tab(s) orally every 12 hours   acetaminophen 325 mg oral tablet: 2 tab(s) orally every 6 hours, As needed, Mild Pain (1 - 3)  glycopyrrolate 1 mg oral tablet: 1 tab(s) by gastrostomy tube once a day (at bedtime)  glycopyrrolate 2 mg oral tablet: 1 tab(s) by gastrostomy tube once a day

## 2020-10-21 NOTE — CHART NOTE - NSCHARTNOTEFT_GEN_A_CORE
Assessment: patient seen for follow up malnutrition and noted to be currently NPO TF held 10/20.  on AVAPS per MD to re-start tube feeding if more stable . today decision on GOC for possible home hospice. MOLST in place. 10/20 BM    Factors impacting intake: [ ] none [ ] nausea  [ ] vomiting [ ] diarrhea [ ] constipation  [ ]chewing problems [ ] swallowing issues  [x ] other: NPO unitl more stable    Diet Presciption: NPO  Intake:     Current Weight: Weight (kg): 53 (10-18 @ 06:15)  % Weight Change    Pertinent Medications: MEDICATIONS  (STANDING):  enoxaparin Injectable 40 milliGRAM(s) SubCutaneous daily  escitalopram 20 milliGRAM(s) Oral daily  glycopyrrolate 1 milliGRAM(s) Oral at bedtime  glycopyrrolate 2 milliGRAM(s) Oral daily  linaclotide 290 MICROGram(s) Oral <User Schedule>  pantoprazole  Injectable 40 milliGRAM(s) IV Push daily  piperacillin/tazobactam IVPB.. 3.375 Gram(s) IV Intermittent every 8 hours  polyethylene glycol 3350 17 Gram(s) Oral two times a day  riluzole 50 milliGRAM(s) Oral two times a day  senna 2 Tablet(s) Oral at bedtime  sodium chloride 0.9%. 1000 milliLiter(s) (75 mL/Hr) IV Continuous <Continuous>    MEDICATIONS  (PRN):  acetaminophen   Tablet .. 650 milliGRAM(s) Oral every 6 hours PRN Mild Pain (1 - 3)  guaiFENesin   Syrup  (Sugar-Free) 200 milliGRAM(s) Oral every 6 hours PRN Cough    Pertinent Labs: 10-21 Na142 mmol/L Glu 107 mg/dL<H> K+ 3.9 mmol/L Cr  0.32 mg/dL<L> BUN 17 mg/dL 10-21 Phos 3.1 mg/dL 10-21 Alb 1.8 g/dL<L>     CAPILLARY BLOOD GLUCOSE      POCT Blood Glucose.: 383 mg/dL (21 Oct 2020 07:40)  POCT Blood Glucose.: 118 mg/dL (21 Oct 2020 06:07)  POCT Blood Glucose.: 115 mg/dL (21 Oct 2020 00:22)  POCT Blood Glucose.: 122 mg/dL (20 Oct 2020 18:07)  POCT Blood Glucose.: 170 mg/dL (20 Oct 2020 12:32)    Skin: coccyx stage 1 + 1 generalized edema + 2 right and left ankle edema    Estimated Needs:   [x ] no change since previous assessment 6872-3132 kcals and 63-74gms protein based in wt 117#   [ ] recalculated:     Previous Nutrition Diagnosis:   [ ] Inadequate Energy Intake [ ]Inadequate Oral Intake [ ] Excessive Energy Intake   [ ] Underweight [ ] Increased Nutrient Needs [ ] Overweight/Obesity   [ ] Altered GI Function [ ] Unintended Weight Loss [ ] Food & Nutrition Related Knowledge Deficit [x ] Malnutrition     Nutrition Diagnosis is [x ] ongoing  [ ] resolved [ ] not applicable     New Nutrition Diagnosis: [x ] not applicable       Interventions:   Recommend  [ ] Change Diet To:  [ ] Nutrition Supplement  [x ] Nutrition Support would consider continuous feeding if to re start PEG feeds possible better tolerance then bolus. jevity 1.5 goal [id number]  1755 kcals and 74.6 gms protein. ( start 20ml hr) will follow and remain available   [ ] Other:     Monitoring and Evaluation:   [ ] PO intake [ x ] Tolerance to diet prescription [ x ] weights [ x ] labs[ x ] follow up per protocol  [ ] other:

## 2020-10-21 NOTE — DISCHARGE NOTE PROVIDER - CARE PROVIDER_API CALL
ROSIE ORLANDO  [id number]  [address] [place] [address]  Phone: ()-  Fax: ()-  Follow Up Time: 1 week    Giovanny Timmons  GASTROENTEROLOGY  [address] [place] [id number]  Phone: [phone]  Fax: [phone]  Follow Up Time: 1 month

## 2020-10-21 NOTE — GOALS OF CARE CONVERSATION - ADVANCED CARE PLANNING - CONVERSATION DETAILS
[facility]    Patient approved for the [facility]osp[facility] as per Etienne Bacon NP in collaboration with Dr. Nati Najera. Consents reviewed, signed and received with son, Gregorio MillerPaitient's  , verbally agrees to hospice but defers to patient's son for signing of consents.     As per discussion with [facility] (Etienne Bacon NP) and  PCT Dr. Abena Edouard, patient to be weaned off BiPAP to NRB and to be transferred at 8pm to [facility]   after patient stable for 4hrs on NRB. Staff RN Ashley provided report to [facility] RN and will keep IVL intact and PEG tube clamped.    TCT to sonCarlito at 5pm to advise of tentative transfer time. Provided son with Hosp[facility] address and direct phone number and visiting hours.     Thank you for this referral    Tegan Recinos, RN, BSN, CHPN, OCN  Hospice Inpatient Specialist  [phone] [facility]    Patient approved for the Hospice [facility] as per Etienne Bacon NP in collaboration with Dr. Nati Najera as per Doc to doc review with Dr. Abena Edouard. Consents reviewed, signed and received with sonGregorioPaitient's  , verbally agrees to hospice but defers to patient's son for signing of consents.     As per discussion with [facility] (Etienne Bacon NP) and  PCT Dr. Abena Edouard, patient to be weaned off BiPAP to NRB and to be transferred at 8pm to [facility]   after patient stable for 4hrs on NRB. Staff RN Ashley provided report to INN RN and will keep IVL intact and PEG tube clamped.    TCT to sonCarlito at 5pm to advise of tentative transfer time. Provided son with Hospice [facility] address and direct phone number and visiting hours.     Thank you for this referral    Tegan Recinos, RN, BSN, CHPN, OCN  Hospice Inpatient Specialist  [phone]

## 2020-10-21 NOTE — CONSULT NOTE ADULT - ASSESSMENT
68 y/o F with PMHx of HTN, advanced ALS, s/p PEG tube on tube feedings, quadriplegic, presented to the ED with a chief complaint of dyspnea a/w acute hypoxic and hypercapnic respiratory failure likely in the setting of progressive ALS, now with sepsis and recurrent acute hypoxic and hypercapnic respiratory failure due to aspiration PNA in setting of end-stage ALS.     1) Acute respiratory failure with hypoxia and hypercapnia  2) ALS (amyotrophic lateral sclerosis (end stage)  3) Severe protein calorie malnutrition with cachexia s/p PEG  4) Failure to thrive, bedbound, AMS 2/2 sepsis  5) Goals of care/advance care planning  - Palliative performance scale score: 10%   - Prognosis: hours-days   - Code status: DNR/DNI   - GOC: comfort measures only. Transfer to inpatient hospice for symptom management of dyspnea.   - Surrogate: pt's spouse Lambert Sr & son Lambert Jr  - Psychosocial support provided  - SW to start referral to hospice    6) Dyspnea  - start morphine 2 mg IVP q1h prn  -switched Bipap to NRBM for comfort as discussed with [facility].     Appreciate consult. Discussed with the primary team.    Abena Archer MD

## 2020-10-21 NOTE — DISCHARGE NOTE PROVIDER - CARE PROVIDERS DIRECT ADDRESSES
,DirectAddress_Unknown,[email or web address]

## 2020-10-21 NOTE — PROGRESS NOTE ADULT - PROBLEM SELECTOR PROBLEM 2
Constipation
ALS (amyotrophic lateral sclerosis)
ALS (amyotrophic lateral sclerosis)

## 2020-10-21 NOTE — CONSULT NOTE ADULT - TREATMENT GUIDELINES
DNI/DNR Order/No artificial nutrition/No IV fluids/Comfort measures only/No blood draws/No antibiotics/Do not re-hospitalize

## 2020-10-21 NOTE — DISCHARGE NOTE PROVIDER - PROVIDER TOKENS
PROVIDER:[TOKEN:[[id number]],FOLLOWUP:[1 week]],PROVIDER:[TOKEN:[[id number]],FOLLOWUP:[1 month]]

## 2020-10-21 NOTE — PROGRESS NOTE ADULT - SUBJECTIVE AND OBJECTIVE BOX
[facility]  INFECTIOUS DISEASES   [address] [place] [address]  Tel: [phone]     Fax: [phone]  =======================================================    [facility]-[id number]  GELY CUENCA     Follow up: acute hypoxic respiratory failure     Now unresponsive on Bipap, No fever.     PAST MEDICAL & SURGICAL HISTORY:  HTN (hypertension)  ALS (amyotrophic lateral sclerosis)  S/P shoulder replacement, left  S/P percutaneous endoscopic gastrostomy (PEG) tube placement  S/P brain surgery  for neuroma    Social Hx: no smoking, or other toxic habits     FAMILY HISTORY:  FHx: leukemia    FHx: breast cancer  in mother    Allergies  Macrobid (Unknown)  Zithromax (Unknown)    Intolerances  sulfa drugs (Other)    Antibiotics:  None     REVIEW OF SYSTEMS:  nonverbal    Physical Exam:  Vital Signs Last 24 Hrs  T(C): 36.5 (21 Oct 2020 11:48), Max: 37.1 (21 Oct 2020 08:16)  T(F): 97.7 (21 Oct 2020 11:48), Max: 98.8 (21 Oct 2020 08:16)  HR: 113 (21 Oct 2020 11:48) (80 - 123)  BP: 97/60 (21 Oct 2020 08:16) (66/41 - 102/67)  BP(mean): --  RR: 19 (21 Oct 2020 11:48) (18 - 19)  SpO2: 92% (21 Oct 2020 11:48) (85% - 97%)  GEN: NAD  HEENT: normocephalic and atraumatic. EOMI. PERRL.    NECK: Supple.  No lymphadenopathy   LUNGS: Coarse rhonchi bilaterally   HEART: Regular rate and rhythm   ABDOMEN: Soft, nontender, and nondistended.  Positive bowel sounds.    : No CVA tenderness, farrell+   EXTREMITIES: no edema   NEUROLOGIC: contracted extremities and functional quadriplegia  PSYCHIATRIC: Appropriate affect .  SKIN: No ulceration or induration present.    Labs:                        10.6   19.04 )-----------( 199      ( 21 Oct 2020 06:48 )             31.2      10-21    142  |  104  |  17  ----------------------------<  107<H>  3.9   |  35<H>  |  0.32<L>    Ca    9.0      21 Oct 2020 06:48  Phos  3.1     10-21  Mg     2.0     10-21    TPro  6.5  /  Alb  1.8<L>  /  TBili  0.7  /  DBili  x   /  AST  16  /  ALT  25  /  AlkPhos  102  10-21    Culture - Urine (collected 10-18-20 @ 13:58)  Source: .Urine Clean Catch (Midstream)  Final Report (10-20-20 @ 10:42):    50,000 - 99,000 CFU/mL Streptococcus agalactiae (Group B) isolated    Group B streptococci are susceptible to ampicillin,    penicillin and cefazolin, but may be resistant to    erythromycin and clindamycin.    Recommendations for intrapartum prophylaxis for Group B    streptococci are penicillin or ampicillin.    Culture - Blood (collected 10-18-20 @ 04:39)  Source: .Blood Blood-Peripheral    Culture - Blood (collected 10-18-20 @ 04:39)  Source: .Blood Blood-Peripheral    WBC Count: 19.04 K/uL (10-21-20 @ 06:48)  WBC Count: 21.50 K/uL (10-20-20 @ 06:49)  WBC Count: 9.99 K/uL (10-19-20 @ 05:25)  WBC Count: 7.47 K/uL (10-18-20 @ 08:01)  WBC Count: 8.36 K/uL (10-18-20 @ 00:48)    Creatinine, Serum: 0.32 mg/dL (10-21-20 @ 06:48)  Creatinine, Serum: 0.34 mg/dL (10-20-20 @ 06:49)  Creatinine, Serum: 0.29 mg/dL (10-19-20 @ 05:25)  Creatinine, Serum: 0.34 mg/dL (10-18-20 @ 08:01)  Creatinine, Serum: 0.45 mg/dL (10-18-20 @ 00:48)    Procalcitonin, Serum: 0.06 ng/mL (10-18-20 @ 00:48)     COVID-19 PCR: NotDetec (10-18-20 @ 00:48)    All imaging and other data have been reviewed.  < from: Xray Chest 1 View-PORTABLE IMMEDIATE (10.18.20 @ 00:43) >  EXAM:  XR CHEST PORTABLE IMMED 1V                        PROCEDURE DATE:  10/18/2020    INTERPRETATION:  HISTORY: Shortness of breath, fever, cough  TECHNIQUE: Single frontal view of the chest with comparison to 6/14/2019  FINDINGS:  The heart is normal in size. Lungs are grossly clear. The apices and hemidiaphragms are unremarkable. Degenerative changes. Prior left humeral head replacement.  IMPRESSION:  No acute disease.  No significant change as compared to 6/14/2019    Assessment and Plan:   70 y/o woman with PMH of ALS, HTN, s/p PEG tube on tube feedings was admitted with dyspnea. Has PEG, aspiration is a possibility, increase in WBC and abnormal ABG.     Acute respiratory failure, Aspiration pneumonia/HCAP?, ALS, PEG  - Blood culture NGTD on 10/18  - UA negative and UC most likely contamination with strep low CFU  - CXR initially no opacities, but CT not done  - Repeat CXR result from yesterday with LLL opacity and effusion.   - Continue Zosyn 3.375gm q8h to cover Aspiration and possible HCAP since was in the hospital recently   - Palliative care follow up noted.     Will follow.    Freeman Mead MD  Division of Infectious Diseases   Cell [phone] between 8am and 6pm   After 6pm and weekends please call ID service at [phone].     Attending Attestation:   Plan discussed with Dr. Morris.

## 2021-03-22 ENCOUNTER — APPOINTMENT (OUTPATIENT)
Dept: NEUROLOGY | Facility: CLINIC | Age: 71
End: 2021-03-22

## 2022-03-28 NOTE — ED ADULT NURSE NOTE - NSFALLRSKPASTHIST_ED_ALL_ED
03/28/22 0915   Engagement   Intervention Group   Topic Detail OT: Education on physical and social wellness with physical movement (Theraband) and interactive social activity (Chat Pack) to increase concentration, attention to task, symptom management, coping with stress, sharing information about self, listening to others, physical wellness, social wellness, and overall well-being   Attendance Did not attend      no

## 2024-02-08 NOTE — DISCHARGE NOTE PROVIDER - NSDCDCMDCOMP_GEN_ALL_CORE
Please return emerged part for any other concerns you may have.  CT of her chest abdomen pelvis and spine are still pending.  Please return if she has worsening pain.  Please follow-up  She will be primary care provider follow-up with primary care provider with her primary care provider in 3 days.  Please follow your images at  portal.   This document is complete and the patient is ready for discharge.

## 2024-08-26 NOTE — PROVIDER CONTACT NOTE (EICU) - REASON
Admission Quality 226: Preventive Care And Screening: Tobacco Use: Screening And Cessation Intervention: Patient screened for tobacco use and is an ex/non-smoker Detail Level: Detailed Quality 130: Documentation Of Current Medications In The Medical Record: Current Medications Documented Quality 431: Preventive Care And Screening: Unhealthy Alcohol Use - Screening: Patient not identified as an unhealthy alcohol user when screened for unhealthy alcohol use using a systematic screening method